# Patient Record
Sex: FEMALE | Race: BLACK OR AFRICAN AMERICAN | Employment: OTHER | URBAN - METROPOLITAN AREA
[De-identification: names, ages, dates, MRNs, and addresses within clinical notes are randomized per-mention and may not be internally consistent; named-entity substitution may affect disease eponyms.]

---

## 2017-06-02 ENCOUNTER — OFFICE VISIT (OUTPATIENT)
Dept: FAMILY MEDICINE CLINIC | Age: 57
End: 2017-06-02

## 2017-06-02 VITALS
DIASTOLIC BLOOD PRESSURE: 90 MMHG | TEMPERATURE: 98.2 F | HEIGHT: 62 IN | SYSTOLIC BLOOD PRESSURE: 152 MMHG | HEART RATE: 67 BPM | BODY MASS INDEX: 29.44 KG/M2 | RESPIRATION RATE: 18 BRPM | WEIGHT: 160 LBS | OXYGEN SATURATION: 96 %

## 2017-06-02 DIAGNOSIS — F41.9 ANXIETY: ICD-10-CM

## 2017-06-02 DIAGNOSIS — D50.9 IRON DEFICIENCY ANEMIA, UNSPECIFIED IRON DEFICIENCY ANEMIA TYPE: ICD-10-CM

## 2017-06-02 DIAGNOSIS — M16.11 ARTHRITIS OF RIGHT HIP: Primary | ICD-10-CM

## 2017-06-02 RX ORDER — DICLOFENAC SODIUM 75 MG/1
75 TABLET, DELAYED RELEASE ORAL
Qty: 60 TAB | Refills: 1 | Status: SHIPPED | OUTPATIENT
Start: 2017-06-02 | End: 2018-09-28

## 2017-06-02 RX ORDER — ALPRAZOLAM 0.5 MG/1
TABLET ORAL
Qty: 60 TAB | Refills: 2 | Status: SHIPPED | OUTPATIENT
Start: 2017-06-02 | End: 2017-11-20 | Stop reason: SDUPTHER

## 2017-06-02 NOTE — PROGRESS NOTES
Brit Marcos is a 61 y.o. female   Chief Complaint   Patient presents with    Medication Refill    pt here for f/u of her anxiety and states that the xanax is working well and does need a refill of this. No issues with med. Pt also reports that she feels achey in the morning and does not exercise at all. Takes BC powder for her achiness and sometimes helps. Mostly in her R hip. Pt also overdue for her anemia check pt is not taking any iron, no increased fatigue. she is a 61y.o. year old female who presents for evalution. Reviewed PmHx, RxHx, FmHx, SocHx, AllgHx and updated and dated in the chart. Review of Systems - negative except as listed above in the HPI    Objective:     Vitals:    06/02/17 0723   BP: 152/90   Pulse: 67   Resp: 18   Temp: 98.2 °F (36.8 °C)   TempSrc: Oral   SpO2: 96%   Weight: 160 lb (72.6 kg)   Height: 5' 2\" (1.575 m)       Current Outpatient Prescriptions   Medication Sig    ALPRAZolam (XANAX) 0.5 mg tablet take 1 tablet by mouth twice a day if needed. Must last 30 days.  diclofenac EC (VOLTAREN) 75 mg EC tablet Take 1 Tab by mouth two (2) times daily as needed.  lisinopril-hydroCHLOROthiazide (PRINZIDE, ZESTORETIC) 20-12.5 mg per tablet Take 1 Tab by mouth daily.  omeprazole (PRILOSEC) 40 mg capsule Take 1 Cap by mouth daily.  ondansetron (ZOFRAN ODT) 4 mg disintegrating tablet Take 1 Tab by mouth every eight (8) hours as needed for Nausea.  nystatin (MYCOSTATIN) topical cream Apply  to affected area two (2) times a day.  baclofen (LIORESAL) 10 mg tablet Take 1 tablet by mouth three (3) times daily as needed.  buPROPion XL (WELLBUTRIN XL) 150 mg tablet Take 1 Tab by mouth every morning.  HYDROcodone-acetaminophen (LORTAB) 7.5-500 mg per tablet Take  by mouth every four (4) hours as needed for Pain.  ibuprofen (MOTRIN) 800 mg tablet Take  by mouth. No current facility-administered medications for this visit.         Physical Examination: General appearance - alert, well appearing, and in no distress  Eyes - pupils equal and reactive, extraocular eye movements intact  Chest - clear to auscultation, no wheezes, rales or rhonchi, symmetric air entry  Heart - normal rate, regular rhythm, normal S1, S2, no murmurs, rubs, clicks or gallops  Musculoskeletal - mild ttp ovr lateral R hip joint, tight muscles of calf b/l likely from inactivity      Assessment/ Plan:   Irving Garcia was seen today for medication refill. Diagnoses and all orders for this visit:    Arthritis of right hip  -     diclofenac EC (VOLTAREN) 75 mg EC tablet; Take 1 Tab by mouth two (2) times daily as needed. Discussed slowly increasing activity as well  Anxiety  -     ALPRAZolam (XANAX) 0.5 mg tablet; take 1 tablet by mouth twice a day if needed. Must last 30 days. Iron deficiency anemia, unspecified iron deficiency anemia type  -     CBC WITH AUTOMATED DIFF       Follow-up Disposition:  Return in about 3 months (around 9/2/2017), or if symptoms worsen or fail to improve. I have discussed the diagnosis with the patient and the intended plan as seen in the above orders. The patient has received an after-visit summary and questions were answered concerning future plans. Pt conveyed understanding of plan.     Medication Side Effects and Warnings were discussed with patient      Thanh Grover DO

## 2017-06-03 LAB
BASOPHILS # BLD AUTO: 0 X10E3/UL (ref 0–0.2)
BASOPHILS NFR BLD AUTO: 1 %
EOSINOPHIL # BLD AUTO: 0.1 X10E3/UL (ref 0–0.4)
EOSINOPHIL NFR BLD AUTO: 2 %
ERYTHROCYTE [DISTWIDTH] IN BLOOD BY AUTOMATED COUNT: 16.6 % (ref 12.3–15.4)
HCT VFR BLD AUTO: 35.4 % (ref 34–46.6)
HGB BLD-MCNC: 11 G/DL (ref 11.1–15.9)
IMM GRANULOCYTES # BLD: 0 X10E3/UL (ref 0–0.1)
IMM GRANULOCYTES NFR BLD: 0 %
LYMPHOCYTES # BLD AUTO: 1.3 X10E3/UL (ref 0.7–3.1)
LYMPHOCYTES NFR BLD AUTO: 29 %
MCH RBC QN AUTO: 30 PG (ref 26.6–33)
MCHC RBC AUTO-ENTMCNC: 31.1 G/DL (ref 31.5–35.7)
MCV RBC AUTO: 97 FL (ref 79–97)
MONOCYTES # BLD AUTO: 0.4 X10E3/UL (ref 0.1–0.9)
MONOCYTES NFR BLD AUTO: 9 %
NEUTROPHILS # BLD AUTO: 2.7 X10E3/UL (ref 1.4–7)
NEUTROPHILS NFR BLD AUTO: 59 %
PLATELET # BLD AUTO: 315 X10E3/UL (ref 150–379)
RBC # BLD AUTO: 3.67 X10E6/UL (ref 3.77–5.28)
WBC # BLD AUTO: 4.5 X10E3/UL (ref 3.4–10.8)

## 2017-06-05 NOTE — PROGRESS NOTES
Your labs are showing a very mild anemia. I would like you to start a daily multivitamin with iron and then we can recheck this in 6-8 weeks.

## 2017-11-17 DIAGNOSIS — K21.9 GERD WITHOUT ESOPHAGITIS: ICD-10-CM

## 2017-11-17 RX ORDER — OMEPRAZOLE 40 MG/1
CAPSULE, DELAYED RELEASE ORAL
Qty: 30 CAP | Refills: 11 | Status: SHIPPED | OUTPATIENT
Start: 2017-11-17 | End: 2018-05-11 | Stop reason: ALTCHOICE

## 2017-11-20 ENCOUNTER — OFFICE VISIT (OUTPATIENT)
Dept: FAMILY MEDICINE CLINIC | Age: 57
End: 2017-11-20

## 2017-11-20 VITALS
HEART RATE: 63 BPM | HEIGHT: 62 IN | OXYGEN SATURATION: 99 % | TEMPERATURE: 98 F | DIASTOLIC BLOOD PRESSURE: 82 MMHG | SYSTOLIC BLOOD PRESSURE: 158 MMHG | RESPIRATION RATE: 16 BRPM | BODY MASS INDEX: 29.08 KG/M2 | WEIGHT: 158 LBS

## 2017-11-20 DIAGNOSIS — J30.9 ACUTE ALLERGIC RHINITIS, UNSPECIFIED SEASONALITY, UNSPECIFIED TRIGGER: ICD-10-CM

## 2017-11-20 DIAGNOSIS — H10.13 ALLERGIC CONJUNCTIVITIS OF BOTH EYES: Primary | ICD-10-CM

## 2017-11-20 DIAGNOSIS — F41.9 ANXIETY: ICD-10-CM

## 2017-11-20 RX ORDER — ALPRAZOLAM 0.5 MG/1
TABLET ORAL
Qty: 60 TAB | Refills: 2 | Status: SHIPPED | OUTPATIENT
Start: 2017-11-20 | End: 2018-05-11 | Stop reason: SDUPTHER

## 2017-11-20 RX ORDER — CROMOLYN SODIUM 40 MG/ML
1 SOLUTION/ DROPS OPHTHALMIC 4 TIMES DAILY
Qty: 10 ML | Refills: 0 | Status: SHIPPED | OUTPATIENT
Start: 2017-11-20

## 2017-11-20 RX ORDER — AZELASTINE 1 MG/ML
2 SPRAY, METERED NASAL 2 TIMES DAILY
Qty: 1 BOTTLE | Refills: 1 | Status: SHIPPED | OUTPATIENT
Start: 2017-11-20 | End: 2018-09-21

## 2017-11-20 NOTE — MR AVS SNAPSHOT
Visit Information Date & Time Provider Department Dept. Phone Encounter #  
 11/20/2017  9:15 AM Marcelle Chavez NP [address] [facility] [phone] [id number] Follow-up Instructions Return if symptoms worsen or fail to improve. Upcoming Health Maintenance Date Due  
 BREAST CANCER SCRN MAMMOGRAM 11/11/2018 PAP AKA CERVICAL CYTOLOGY 11/11/2019 COLONOSCOPY 4/24/2024 DTaP/Tdap/Td series (2 - Td) 11/11/2026 Allergies as of 11/20/2017  Review Complete On: 11/20/2017 By: Marcelle Chavez NP No Known Allergies Current Immunizations  Never Reviewed No immunizations on file. Not reviewed this visit You Were Diagnosed With   
  
 Codes Comments Allergic conjunctivitis of both eyes    -  Primary ICD-10-CM: H10.13 ICD-9-CM: 372.14 Acute allergic rhinitis, unspecified seasonality, unspecified trigger     ICD-10-CM: J30.9 ICD-9-CM: 477.9 Anxiety     ICD-10-CM: F41.9 ICD-9-CM: 300.00 Vitals BP Pulse Temp Resp Height(growth percentile) Weight(growth percentile) 158/82 63 98 °F (36.7 °C) (Oral) 16 5' 2\" (1.575 m) 158 lb (71.7 kg) SpO2 BMI OB Status Smoking Status 99% 28.9 kg/m2 Hysterectomy Current Every Day Smoker Vitals History BMI and BSA Data Body Mass Index Body Surface Area  
 28.9 kg/m 2 1.77 m 2 Preferred Pharmacy Pharmacy Name Phone [id number] Fatuma [phone] Your Updated Medication List  
  
   
This list is accurate as of: 11/20/17  9:39 AM.  Always use your most recent med list.  
  
  
  
  
 ALPRAZolam 0.5 mg tablet Commonly known as:  XANAX  
take 1 tablet by mouth twice a day if needed. Must last 30 days. azelastine 137 mcg (0.1 %) nasal spray Commonly known as:  ASTELIN  
2 Sprays by Both Nostrils route two (2) times a day. Use in each nostril as directed  
  
 baclofen 10 mg tablet Commonly known as:  LIORESAL Take 1 tablet by mouth three (3) times daily as needed. buPROPion  mg tablet Commonly known as:  James Cord Take 1 Tab by mouth every morning. cromolyn 4 % ophthalmic solution Commonly known as:  OPTICROM Administer 1 Drop to both eyes four (4) times daily. Use in affected eye(s)  
  
 diclofenac EC 75 mg EC tablet Commonly known as:  VOLTAREN Take 1 Tab by mouth two (2) times daily as needed. ibuprofen 800 mg tablet Commonly known as:  MOTRIN Take  by mouth.  
  
 lisinopril-hydroCHLOROthiazide 20-12.5 mg per tablet Commonly known as:  Holdrege Boxer Take 1 Tab by mouth daily. nystatin topical cream  
Commonly known as:  MYCOSTATIN Apply  to affected area two (2) times a day. omeprazole 40 mg capsule Commonly known as:  PRILOSEC  
take 1 capsule by mouth once daily  
  
 ondansetron 4 mg disintegrating tablet Commonly known as:  ZOFRAN ODT Take 1 Tab by mouth every eight (8) hours as needed for Nausea. Prescriptions Printed Refills ALPRAZolam (XANAX) 0.5 mg tablet 2 Sig: take 1 tablet by mouth twice a day if needed. Must last 30 days. Class: Print Prescriptions Sent to Pharmacy Refills  
 cromolyn (OPTICROM) 4 % ophthalmic solution 0 Sig: Administer 1 Drop to both eyes four (4) times daily. Use in affected eye(s) Class: Normal  
 Pharmacy: [id number] [address] [place] [address], [facility] Ph #: [phone] Route: Both Eyes  
 azelastine (ASTELIN) 137 mcg (0.1 %) nasal spray 1 Si Sprays by Both Nostrils route two (2) times a day. Use in each nostril as directed Class: Normal  
 [id number], [facility] [address] [facility] Ph #: [phone] Route: Both Nostrils Follow-up Instructions Return if symptoms worsen or fail to improve. Introducing [facility]!    
 Dear Michael Rose: 
 Thank you for requesting a [employer] account. Our records indicate that you already have an active Mig[employer] account. You can access your account anytime at [email or web address] Did you know that you can access your hospital and ER discharge instructions at any time in Migoa? You can also review all of your test results from your hospital stay or ER visit. Additional Information If you have questions, please visit the Frequently Asked Questions section of the Migoa website at [email or web address] Remember, [employer]oa is NOT to be used for urgent needs. For medical emergencies, dial 911. Now available from your iPhone and Android! Please provide this summary of care documentation to your next provider. Your primary care clinician is listed as Dann Terry. If you have any questions after today's visit, please call [phone].

## 2017-11-20 NOTE — PROGRESS NOTES
1. Have you been to the ER, urgent care clinic since your last visit? Hospitalized since your last visit? No    2. Have you seen or consulted any other health care providers outside of the [address] [place] [address] since your last visit? Include any pap smears or colon screening.  No     Chief Complaint   Patient presents with    Medication Refill     Omerprazole, Xanax    Sinus Infection     Puffy eyes, Stuffy ears, x4 days

## 2017-11-20 NOTE — PROGRESS NOTES
Chief Complaint   Patient presents with    Medication Refill     Omerprazole, Xanax    Sinus Infection     Puffy eyes, Stuffy ears, x4 days     she is a 61y.o. year old female who presents for evalution. Pt states here for Xanax refills. Takes as needed for anxiety. Pt states has been having irritation and itching in eyes and ears. Pt has been taking Benadryl but doesn't help at al.  Also has post nasal drainage. Reviewed PmHx, RxHx, FmHx, SocHx, AllgHx and updated and dated in the chart. Review of Systems - negative except as listed above in the HPI    Objective:     Vitals:    11/20/17 0925   BP: 158/82   Pulse: 63   Resp: 16   Temp: 98 °F (36.7 °C)   TempSrc: Oral   SpO2: 99%   Weight: 158 lb (71.7 kg)   Height: 5' 2\" (1.575 m)     Physical Examination: General appearance - alert, well appearing, and in no distress  Mental status - alert, oriented to person, place, and time, normal mood, behavior, speech, dress, motor activity, and thought processes  Eyes - slight bilateral conjunctivitis   Ears - bilateral TM's and external ear canals normal  Nose - normal nontender sinuses, mucosal congestion and mucosal pallor  Mouth - mucous membranes moist, pharynx normal without lesions  Neck - supple, no significant adenopathy  Chest - clear to auscultation, no wheezes, rales or rhonchi, symmetric air entry  Heart - normal rate, regular rhythm, normal S1, S2, no murmurs, rubs, clicks or gallops    Assessment/ Plan:   Diagnoses and all orders for this visit:    1. Allergic conjunctivitis of both eyes  -     cromolyn (OPTICROM) 4 % ophthalmic solution; Administer 1 Drop to both eyes four (4) times daily. Use in affected eye(s)  New rx. F/U prn    2. Acute allergic rhinitis, unspecified seasonality, unspecified trigger  -     azelastine (ASTELIN) 137 mcg (0.1 %) nasal spray; 2 Sprays by Both Nostrils route two (2) times a day. Use in each nostril as directed  New rx. F/U prn    3.  Anxiety  - ALPRAZolam (XANAX) 0.5 mg tablet; take 1 tablet by mouth twice a day if needed. Must last 30 days. Reviewed , pt taking appropriately. Refills provided. Pt voiced understanding regarding plan of care. Follow-up Disposition:  Return if symptoms worsen or fail to improve. I have discussed the diagnosis with the patient and the intended plan as seen in the above orders. The patient has received an after-visit summary and questions were answered concerning future plans.      Medication Side Effects and Warnings were discussed with patient    Raymundo Black NP

## 2017-12-22 DIAGNOSIS — I10 ESSENTIAL HYPERTENSION: ICD-10-CM

## 2017-12-22 RX ORDER — LISINOPRIL AND HYDROCHLOROTHIAZIDE 12.5; 2 MG/1; MG/1
TABLET ORAL
Qty: 30 TAB | Refills: 5 | Status: SHIPPED | OUTPATIENT
Start: 2017-12-22 | End: 2020-09-03

## 2018-05-11 ENCOUNTER — OFFICE VISIT (OUTPATIENT)
Dept: FAMILY MEDICINE CLINIC | Age: 58
End: 2018-05-11

## 2018-05-11 VITALS
HEART RATE: 69 BPM | DIASTOLIC BLOOD PRESSURE: 98 MMHG | TEMPERATURE: 97.5 F | SYSTOLIC BLOOD PRESSURE: 161 MMHG | HEIGHT: 62 IN | WEIGHT: 159 LBS | OXYGEN SATURATION: 100 % | RESPIRATION RATE: 16 BRPM | BODY MASS INDEX: 29.26 KG/M2

## 2018-05-11 DIAGNOSIS — F41.9 ANXIETY: ICD-10-CM

## 2018-05-11 DIAGNOSIS — I10 ESSENTIAL HYPERTENSION: ICD-10-CM

## 2018-05-11 DIAGNOSIS — K21.9 GERD WITHOUT ESOPHAGITIS: ICD-10-CM

## 2018-05-11 DIAGNOSIS — Z00.00 ROUTINE GENERAL MEDICAL EXAMINATION AT A HEALTH CARE FACILITY: Primary | ICD-10-CM

## 2018-05-11 DIAGNOSIS — Z12.39 SCREENING FOR BREAST CANCER: ICD-10-CM

## 2018-05-11 RX ORDER — ALPRAZOLAM 0.5 MG/1
TABLET ORAL
Qty: 60 TAB | Refills: 2 | Status: SHIPPED | OUTPATIENT
Start: 2018-05-11 | End: 2018-11-08 | Stop reason: SDUPTHER

## 2018-05-11 RX ORDER — BUPROPION HYDROCHLORIDE 150 MG/1
150 TABLET ORAL
Qty: 30 TAB | Refills: 6 | Status: SHIPPED | OUTPATIENT
Start: 2018-05-11 | End: 2020-10-07 | Stop reason: SDUPTHER

## 2018-05-11 RX ORDER — PANTOPRAZOLE SODIUM 40 MG/1
40 TABLET, DELAYED RELEASE ORAL DAILY
Qty: 30 TAB | Refills: 5 | Status: SHIPPED | OUTPATIENT
Start: 2018-05-11 | End: 2018-10-14 | Stop reason: SDUPTHER

## 2018-05-11 NOTE — PROGRESS NOTES
1. Have you been to the ER, urgent care clinic since your last visit? Hospitalized since your last visit? No    2. Have you seen or consulted any other health care providers outside of the [address] [place] [address] since your last visit? Include any pap smears or colon screening.  No   Chief Complaint   Patient presents with    Medication Refill     Xanax

## 2018-05-11 NOTE — PATIENT INSTRUCTIONS
Well Visit, Women 48 to 72: Care Instructions  Your Care Instructions    Physical exams can help you stay healthy. Your doctor has checked your overall health and may have suggested ways to take good care of yourself. He or she also may have recommended tests. At home, you can help prevent illness with healthy eating, regular exercise, and other steps. Follow-up care is a key part of your treatment and safety. Be sure to make and go to all appointments, and call your doctor if you are having problems. It's also a good idea to know your test results and keep a list of the medicines you take. How can you care for yourself at home? · Reach and stay at a healthy weight. This will lower your risk for many problems, such as obesity, diabetes, heart disease, and high blood pressure. · Get at least 30 minutes of exercise on most days of the week. Walking is a good choice. You also may want to do other activities, such as running, swimming, cycling, or playing tennis or team sports. · Do not smoke. Smoking can make health problems worse. If you need help quitting, talk to your doctor about stop-smoking programs and medicines. These can increase your chances of quitting for good. · Protect your skin from too much sun. When you're outdoors from 10 a.m. to 4 p.m., stay in the shade or cover up with clothing and a hat with a wide brim. Wear sunglasses that block UV rays. Even when it's cloudy, put broad-spectrum sunscreen (SPF 30 or higher) on any exposed skin. · See a dentist one or two times a year for checkups and to have your teeth cleaned. · Wear a seat belt in the car. · Limit alcohol to 1 drink a day. Too much alcohol can cause health problems. Follow your doctor's advice about when to have certain tests. These tests can spot problems early. · Cholesterol.  Your doctor will tell you how often to have this done based on your age, family history, or other things that can increase your risk for heart attack and stroke. · Blood pressure. Have your blood pressure checked during a routine doctor visit. Your doctor will tell you how often to check your blood pressure based on your age, your blood pressure results, and other factors. · Mammogram. Ask your doctor how often you should have a mammogram, which is an X-ray of your breasts. A mammogram can spot breast cancer before it can be felt and when it is easiest to treat. · Pap test and pelvic exam. Ask your doctor how often you should have a Pap test. You may not need to have a Pap test as often as you used to. · Vision. Have your eyes checked every year or two or as often as your doctor suggests. Some experts recommend that you have yearly exams for glaucoma and other age-related eye problems starting at age 48. · Hearing. Tell your doctor if you notice any change in your hearing. You can have tests to find out how well you hear. · Diabetes. Ask your doctor whether you should have tests for diabetes. · Colon cancer. You should begin tests for colon cancer at age 48. You may have one of several tests. Your doctor will tell you how often to have tests based on your age and risk. Risks include whether you already had a precancerous polyp removed from your colon or whether your parents, sisters and brothers, or children have had colon cancer. · Thyroid disease. Talk to your doctor about whether to have your thyroid checked as part of a regular physical exam. Women have an increased chance of a thyroid problem. · Osteoporosis. You should begin tests for bone density at age 72. If you are younger than 72, ask your doctor whether you have factors that may increase your risk for this disease. You may want to have this test before age 72. · Heart attack and stroke risk. At least every 4 to 6 years, you should have your risk for heart attack and stroke assessed.  Your doctor uses factors such as your age, blood pressure, cholesterol, and whether you smoke or have diabetes to show what your risk for a heart attack or stroke is over the next 10 years. When should you call for help? Watch closely for changes in your health, and be sure to contact your doctor if you have any problems or symptoms that concern you. Where can you learn more? Go to http://maurice-cristopher.info/. Enter A692 in the search box to learn more about \"Well Visit, Women 50 to 72: Care Instructions. \"  Current as of: May 12, 2017  Content Version: 11.4  © 1870-4625 [employer]. Care instructions adapted under license by [employer] (which disclaims liability or warranty for this information). If you have questions about a medical condition or this instruction, always ask your healthcare professional. [address] any warranty or liability for your use of this information.

## 2018-05-11 NOTE — MR AVS SNAPSHOT
[address] [address] 
[phone] Patient: Todd Aguilera MRN: [id number] ZQT:[id number] Visit Information Date & Time Provider Department Dept. Phone Encounter #  
 5/11/2018  7:45 AM Bhavna Sidhu NP [facility] [phone] [id number] Follow-up Instructions Return if symptoms worsen or fail to improve. Upcoming Health Maintenance Date Due Influenza Age 5 to Adult 8/1/2018 BREAST CANCER SCRN MAMMOGRAM 11/11/2018 PAP AKA CERVICAL CYTOLOGY 11/11/2019 COLONOSCOPY 4/24/2024 DTaP/Tdap/Td series (2 - Td) 11/11/2026 Allergies as of 5/11/2018  Review Complete On: 5/11/2018 By: Mary Henderson LPN No Known Allergies Current Immunizations  Never Reviewed No immunizations on file. Not reviewed this visit You Were Diagnosed With   
  
 Codes Comments Routine general medical examination at a health care facility    -  Primary ICD-10-CM: Z00.00 ICD-9-CM: V70.0 Essential hypertension     ICD-10-CM: I10 
ICD-9-CM: 401.9 Anxiety     ICD-10-CM: F41.9 ICD-9-CM: 300.00 GERD without esophagitis     ICD-10-CM: K21.9 ICD-9-CM: 530.81 Screening for breast cancer     ICD-10-CM: Z12.31 
ICD-9-CM: V76.10 Vitals BP Pulse Temp Resp Height(growth percentile) Weight(growth percentile) (!) 161/98 69 97.5 °F (36.4 °C) (Oral) 16 5' 2\" (1.575 m) 159 lb (72.1 kg) SpO2 BMI OB Status Smoking Status 100% 29.08 kg/m2 Hysterectomy Current Every Day Smoker BMI and BSA Data Body Mass Index Body Surface Area 29.08 kg/m 2 1.78 m 2 Preferred Pharmacy Pharmacy Name Phone [id number] [phone] Your Updated Medication List  
  
   
This list is accurate as of 5/11/18  8:18 AM.  Always use your most recent med list.  
  
  
  
  
 ALPRAZolam 0.5 mg tablet Commonly known as:  XANAX  
take 1 tablet by mouth twice a day if needed. Must last 30 days. azelastine 137 mcg (0.1 %) nasal spray Commonly known as:  ASTELIN  
2 Sprays by Both Nostrils route two (2) times a day. Use in each nostril as directed  
  
 baclofen 10 mg tablet Commonly known as:  LIORESAL Take 1 tablet by mouth three (3) times daily as needed. buPROPion  mg tablet Commonly known as:  Abner Neeru Take 1 Tab by mouth every morning. cromolyn 4 % ophthalmic solution Commonly known as:  OPTICROM Administer 1 Drop to both eyes four (4) times daily. Use in affected eye(s)  
  
 diclofenac EC 75 mg EC tablet Commonly known as:  VOLTAREN Take 1 Tab by mouth two (2) times daily as needed. ibuprofen 800 mg tablet Commonly known as:  MOTRIN Take  by mouth.  
  
 lisinopril-hydroCHLOROthiazide 20-12.5 mg per tablet Commonly known as:  PRINZIDE, ZESTORETIC  
take 1 tablet by mouth once daily  
  
 nystatin topical cream  
Commonly known as:  MYCOSTATIN Apply  to affected area two (2) times a day. ondansetron 4 mg disintegrating tablet Commonly known as:  ZOFRAN ODT Take 1 Tab by mouth every eight (8) hours as needed for Nausea. pantoprazole 40 mg tablet Commonly known as:  PROTONIX Take 1 Tab by mouth daily. Prescriptions Printed Refills ALPRAZolam (XANAX) 0.5 mg tablet 2 Sig: take 1 tablet by mouth twice a day if needed. Must last 30 days. Class: Print Prescriptions Sent to Pharmacy Refills  
 pantoprazole (PROTONIX) 40 mg tablet 5 Sig: Take 1 Tab by mouth daily. Class: Normal  
 [id number], [address] [facility] Ph #: [phone] Route: Oral  
 buPROPion XL (WELLBUTRIN XL) 150 mg tablet 6 Sig: Take 1 Tab by mouth every morning.   
 Class: Normal  
 [id number], [address] [place] [address] [address] Ph #: [phone] Route: Oral  
  
We Performed the Following CBC WITH AUTOMATED DIFF [23225 CPT(R)] LIPID PANEL [99006 CPT(R)] METABOLIC PANEL, COMPREHENSIVE [96789 CPT(R)] TSH 3RD GENERATION [23966 CPT(R)] Follow-up Instructions Return if symptoms worsen or fail to improve. To-Do List   
 05/11/2018 Imaging:  [facility] MAM[facility] BI SCREENING INCL CAD Patient Instructions Well Visit, Women 48 to 72: Care Instructions Your Care Instructions Physical exams can help you stay healthy. Your doctor has checked your overall health and may have suggested ways to take good care of yourself. He or she also may have recommended tests. At home, you can help prevent illness with healthy eating, regular exercise, and other steps. Follow-up care is a key part of your treatment and safety. Be sure to make and go to all appointments, and call your doctor if you are having problems. It's also a good idea to know your test results and keep a list of the medicines you take. How can you care for yourself at home? · Reach and stay at a healthy weight. This will lower your risk for many problems, such as obesity, diabetes, heart disease, and high blood pressure. · Get at least 30 minutes of exercise on most days of the week. Walking is a good choice. You also may want to do other activities, such as running, swimming, cycling, or playing tennis or team sports. · Do not smoke. Smoking can make health problems worse. If you need help quitting, talk to your doctor about stop-smoking programs and medicines. These can increase your chances of quitting for good. · Protect your skin from too much sun. When you're outdoors from 10 a.m. to 4 p.m., stay in the shade or cover up with clothing and a hat with a wide brim. Wear sunglasses that block UV rays. Even when it's cloudy, put broad-spectrum sunscreen (SPF 30 or higher) on any exposed skin. · See a dentist one or two times a year for checkups and to have your teeth cleaned. · Wear a seat belt in the car. · Limit alcohol to 1 drink a day. Too much alcohol can cause health problems. Follow your doctor's advice about when to have certain tests. These tests can spot problems early. · Cholesterol. Your doctor will tell you how often to have this done based on your age, family history, or other things that can increase your risk for heart attack and stroke. · Blood pressure. Have your blood pressure checked during a routine doctor visit. Your doctor will tell you how often to check your blood pressure based on your age, your blood pressure results, and other factors. · Mammogram. Ask your doctor how often you should have a mammogram, which is an X-ray of your breasts. A mammogram can spot breast cancer before it can be felt and when it is easiest to treat. · Pap test and pelvic exam. Ask your doctor how often you should have a Pap test. You may not need to have a Pap test as often as you used to. · Vision. Have your eyes checked every year or two or as often as your doctor suggests. Some experts recommend that you have yearly exams for glaucoma and other age-related eye problems starting at age 48. · Hearing. Tell your doctor if you notice any change in your hearing. You can have tests to find out how well you hear. · Diabetes. Ask your doctor whether you should have tests for diabetes. · Colon cancer. You should begin tests for colon cancer at age 48. You may have one of several tests. Your doctor will tell you how often to have tests based on your age and risk. Risks include whether you already had a precancerous polyp removed from your colon or whether your parents, sisters and brothers, or children have had colon cancer. · Thyroid disease. Talk to your doctor about whether to have your thyroid checked as part of a regular physical exam. Women have an increased chance of a thyroid problem. · Osteoporosis. You should begin tests for bone density at age 72. If you are younger than 72, ask your doctor whether you have factors that may increase your risk for this disease. You may want to have this test before age 72. · Heart attack and stroke risk. At least every 4 to 6 years, you should have your risk for heart attack and stroke assessed. Your doctor uses factors such as your age, blood pressure, cholesterol, and whether you smoke or have diabetes to show what your risk for a heart attack or stroke is over the next 10 years. When should you call for help? Watch closely for changes in your health, and be sure to contact your doctor if you have any problems or symptoms that concern you. Where can you learn more? Go to http://maurice-cristopher.info/. Enter X055 in the search box to learn more about \"Well Visit, Women 50 to 72: Care Instructions. \" Current as of: May 12, 2017 Content Version: 11.4 © 3822-5556 [employer]. Care instructions adapted under license by [facility] (which disclaims liability or warranty for this information). If you have questions about a medical condition or this instruction, always ask your healthcare professional. [address] any warranty or liability for your use of this information. Introducing [facility]! Dear Sanket Savage: Thank you for requesting a [employer] account. Our records indicate that you already have an active [employer] account. You can access your account anytime at [email or web address]. [email or web address] Did you know that you can access your hospital and ER discharge instructions at any time in [employer]? You can also review all of your test results from your hospital stay or ER visit. Additional Information If you have questions, please visit the Frequently Asked Questions section of the [employer] website at [email or web address] [email or web address]/. Remember, [employer] is NOT to be used for urgent needs. For medical emergencies, dial 911. Now available from your iPhone and Android! Please provide this summary of care documentation to your next provider. Your primary care clinician is listed as Dann Terry. If you have any questions after today's visit, please call [phone].

## 2018-05-11 NOTE — PROGRESS NOTES
Tawnya Fischer is a 61 y.o. female presenting for their annual checkup. Follows a low fat diet?  no.  Dietary recall: Variable, depends on day. Drinks mostly water and juice. Follow an exercise program?  No but is active at work   Hours of sleep? 6 hrs, wakes up in middle of night 2-3 am - always been that way   Changes in bowel or bladder habits?  no  Up to date on Tdap (<10 years)? Yes   Feels stable and well emotionally? Yes     Current concerns include: Pt states has not been taking blood pressure medication. Just forgets to do take it, has been long term problem for patient. Has been having headaches, no chest pain, SOB, dizziness, or vision changes. Has not had eye exam in past year. Here today for Xanax refills, takes daily - occasionally will have to take 2. Constantly stressed and tired, working a lot and doesn't have any energy when gets home. States GERD has been worsening. Past Medical History:   Diagnosis Date    Hypertension       Past Surgical History:   Procedure Laterality Date    HC LAP BAND ADJUST PROCEDURE      HX CERVICAL FUSION      HX HYSTERECTOMY      REMOVAL GALLBLADDER        Prior to Admission medications    Medication Sig Start Date End Date Taking? Authorizing Provider   pantoprazole (PROTONIX) 40 mg tablet Take 1 Tab by mouth daily. 5/11/18  Yes Radha Bowles NP   ALPRAZolam Mary Craft) 0.5 mg tablet take 1 tablet by mouth twice a day if needed. Must last 30 days. 5/11/18  Yes Radha Bowles NP   buPROPion XL (WELLBUTRIN XL) 150 mg tablet Take 1 Tab by mouth every morning. 5/11/18  Yes Radha Bowles NP   cromolyn (OPTICROM) 4 % ophthalmic solution Administer 1 Drop to both eyes four (4) times daily. Use in affected eye(s) 11/20/17  Yes Radha Bowles NP   azelastine (ASTELIN) 137 mcg (0.1 %) nasal spray 2 Sprays by Both Nostrils route two (2) times a day.  Use in each nostril as directed 11/20/17  Yes Radah Bowles NP   diclofenac EC (VOLTAREN) 75 mg EC tablet Take 1 Tab by mouth two (2) times daily as needed. 6/2/17  Yes Caitlin Melgar DO   ondansetron (ZOFRAN ODT) 4 mg disintegrating tablet Take 1 Tab by mouth every eight (8) hours as needed for Nausea. 4/4/16  Yes Carlin Terry MD   nystatin (MYCOSTATIN) topical cream Apply  to affected area two (2) times a day. 10/12/15  Yes Conchita Márquez NP   baclofen (LIORESAL) 10 mg tablet Take 1 tablet by mouth three (3) times daily as needed. 12/11/14  Yes Kirsty Garcia NP   ibuprofen (MOTRIN) 800 mg tablet Take  by mouth.    Yes Historical Provider   lisinopril-hydroCHLOROthiazide (PRINZIDE, ZESTORETIC) 20-12.5 mg per tablet take 1 tablet by mouth once daily 12/22/17   Maximino Reed NP     No Known Allergies   Social History   Substance Use Topics    Smoking status: Current Every Day Smoker     Packs/day: 1.00     Years: 25.00     Types: Cigarettes    Smokeless tobacco: Current User    Alcohol use 0.0 oz/week     0 Standard drinks or equivalent per week      Comment: rare/1-2 per month      Family History   Problem Relation Age of Onset    Diabetes Mother     Heart Disease Mother     Diabetes Father     Heart Disease Father     Other Sister      IBS/bowel intestine problems        Review of Systems -   Psychological ROS: negative  Ophthalmic ROS: negative  ENT ROS: negative  Endocrine ROS: negative  Breast ROS: negative  Respiratory ROS: no cough, shortness of breath, or wheezing  Cardiovascular ROS: no chest pain or dyspnea on exertion  Gastrointestinal ROS: no abdominal pain, change in bowel habits, or black or bloody stools  Genito-Urinary ROS: no dysuria, trouble voiding, or hematuria  Musculoskeletal ROS: negative  Neurological ROS: no TIA or stroke symptoms  Dermatological ROS: negative    Objective:  Visit Vitals    BP (!) 161/98    Pulse 69    Temp 97.5 °F (36.4 °C) (Oral)    Resp 16    Ht 5' 2\" (1.575 m)    Wt 159 lb (72.1 kg)    SpO2 100%    BMI 29.08 kg/m2     The physical exam is generally normal. Patient appears well, alert and oriented x 3, pleasant, cooperative. Vitals are as noted. Neck supple and free of adenopathy, or masses. No thyromegaly. YUVAL. Ears, throat are normal. Lungs are clear to auscultation. Heart sounds are normal, no murmurs, clicks, gallops or rubs. Abdomen is soft, no tenderness, masses or organomegaly. Breasts: patient declines to have breast exam. Self exam is encouraged. Pelvis: exam declined by the patient. Extremities are normal. Peripheral pulses are normal. Screening neurological exam is normal without focal findings. Skin is normal without suspicious lesions noted. Assessment/Plan:  Diagnoses and all orders for this visit:    1. Routine general medical examination at a health care facility  -     CBC WITH AUTOMATED DIFF  -     TSH 3RD GENERATION  -     METABOLIC PANEL, COMPREHENSIVE  -     LIPID PANEL    2. Essential hypertension  -     METABOLIC PANEL, COMPREHENSIVE  Stressed importance of compliance. Encouraged pt to monitor BP at home, preferably in AM when first wakes up. Goal BP is < 130/90 if on meds. Discussed consequences of uncontrolled HTN and need for yearly eye exam. Recommended pt limit salt intake and engage in regular exercise. Continue current medications. F/U 3 mo or sooner if needed    3. Anxiety  -     TSH 3RD GENERATION  -     ALPRAZolam (XANAX) 0.5 mg tablet; take 1 tablet by mouth twice a day if needed. Must last 30 days. -     buPROPion XL (WELLBUTRIN XL) 150 mg tablet; Take 1 Tab by mouth every morning. New rx of Wellbutrin. Refills provided on Xanax, reviewed , pt taking appropriately. F/U prn    4. GERD without esophagitis  -     pantoprazole (PROTONIX) 40 mg tablet; Take 1 Tab by mouth daily. New rx. F/U prn    5. Screening for breast cancer  -     [facility] MAMMO BI SCREENING INCL CAD; Future    Discussed importance of healthy diet and regular exercise, recommended multivitamin and sunscreen usage.   Encouraged monthly self breast/testicular exam.      Follow-up Disposition:  Return if symptoms worsen or fail to improve.     Cecily Dejesus, NP

## 2018-05-12 LAB
ALBUMIN SERPL-MCNC: 3.9 G/DL (ref 3.5–5.5)
ALBUMIN/GLOB SERPL: 1.7 {RATIO} (ref 1.2–2.2)
ALP SERPL-CCNC: 88 IU/L (ref 39–117)
ALT SERPL-CCNC: 5 IU/L (ref 0–32)
AST SERPL-CCNC: 11 IU/L (ref 0–40)
BASOPHILS # BLD AUTO: 0 X10E3/UL (ref 0–0.2)
BASOPHILS NFR BLD AUTO: 0 %
BILIRUB SERPL-MCNC: <0.2 MG/DL (ref 0–1.2)
BUN SERPL-MCNC: 14 MG/DL (ref 6–24)
BUN/CREAT SERPL: 22 (ref 9–23)
CALCIUM SERPL-MCNC: 9.1 MG/DL (ref 8.7–10.2)
CHLORIDE SERPL-SCNC: 106 MMOL/L (ref 96–106)
CHOLEST SERPL-MCNC: 217 MG/DL (ref 100–199)
CO2 SERPL-SCNC: 21 MMOL/L (ref 18–29)
CREAT SERPL-MCNC: 0.63 MG/DL (ref 0.57–1)
EOSINOPHIL # BLD AUTO: 0.1 X10E3/UL (ref 0–0.4)
EOSINOPHIL NFR BLD AUTO: 1 %
ERYTHROCYTE [DISTWIDTH] IN BLOOD BY AUTOMATED COUNT: 16.7 % (ref 12.3–15.4)
GFR SERPLBLD CREATININE-BSD FMLA CKD-EPI: 114 ML/MIN/1.73
GFR SERPLBLD CREATININE-BSD FMLA CKD-EPI: 99 ML/MIN/1.73
GLOBULIN SER CALC-MCNC: 2.3 G/DL (ref 1.5–4.5)
GLUCOSE SERPL-MCNC: 69 MG/DL (ref 65–99)
HCT VFR BLD AUTO: 31.9 % (ref 34–46.6)
HDLC SERPL-MCNC: 96 MG/DL
HGB BLD-MCNC: 10.4 G/DL (ref 11.1–15.9)
IMM GRANULOCYTES # BLD: 0 X10E3/UL (ref 0–0.1)
IMM GRANULOCYTES NFR BLD: 0 %
INTERPRETATION, 910389: NORMAL
LDLC SERPL CALC-MCNC: 113 MG/DL (ref 0–99)
LYMPHOCYTES # BLD AUTO: 1 X10E3/UL (ref 0.7–3.1)
LYMPHOCYTES NFR BLD AUTO: 21 %
MCH RBC QN AUTO: 30.8 PG (ref 26.6–33)
MCHC RBC AUTO-ENTMCNC: 32.6 G/DL (ref 31.5–35.7)
MCV RBC AUTO: 94 FL (ref 79–97)
MONOCYTES # BLD AUTO: 0.3 X10E3/UL (ref 0.1–0.9)
MONOCYTES NFR BLD AUTO: 6 %
NEUTROPHILS # BLD AUTO: 3.4 X10E3/UL (ref 1.4–7)
NEUTROPHILS NFR BLD AUTO: 72 %
PLATELET # BLD AUTO: 351 X10E3/UL (ref 150–379)
POTASSIUM SERPL-SCNC: 4.2 MMOL/L (ref 3.5–5.2)
PROT SERPL-MCNC: 6.2 G/DL (ref 6–8.5)
RBC # BLD AUTO: 3.38 X10E6/UL (ref 3.77–5.28)
SODIUM SERPL-SCNC: 142 MMOL/L (ref 134–144)
TRIGL SERPL-MCNC: 39 MG/DL (ref 0–149)
TSH SERPL DL<=0.005 MIU/L-ACNC: 0.47 UIU/ML (ref 0.45–4.5)
VLDLC SERPL CALC-MCNC: 8 MG/DL (ref 5–40)
WBC # BLD AUTO: 4.8 X10E3/UL (ref 3.4–10.8)

## 2018-05-14 NOTE — PROGRESS NOTES
Anemia present - worsening, unclear etiology -suspect could be secondary to lap band, will see if pt taking any vitamins. Recheck 1 mo.    Cholesterol slightly above goal - work on diet and exercise

## 2018-05-24 ENCOUNTER — HOSPITAL ENCOUNTER (OUTPATIENT)
Dept: MAMMOGRAPHY | Age: 58
Discharge: HOME | End: 2018-05-24
Attending: NURSE PRACTITIONER
Payer: COMMERCIAL

## 2018-05-24 DIAGNOSIS — Z12.31 VISIT FOR SCREENING MAMMOGRAM: ICD-10-CM

## 2018-05-24 PROCEDURE — 77063 BREAST TOMOSYNTHESIS BI: CPT

## 2018-06-15 ENCOUNTER — HOSPITAL ENCOUNTER (OUTPATIENT)
Dept: MAMMOGRAPHY | Age: 58
Discharge: HOME | End: 2018-06-15
Attending: FAMILY MEDICINE
Payer: COMMERCIAL

## 2018-06-15 DIAGNOSIS — R92.8 MAMMOGRAM ABNORMAL: ICD-10-CM

## 2018-06-15 PROCEDURE — 77065 DX MAMMO INCL CAD UNI: CPT

## 2018-06-20 ENCOUNTER — HOSPITAL ENCOUNTER (OUTPATIENT)
Dept: MAMMOGRAPHY | Age: 58
Discharge: HOME | End: 2018-06-20
Attending: FAMILY MEDICINE
Payer: COMMERCIAL

## 2018-06-20 DIAGNOSIS — R92.1 BREAST CALCIFICATIONS: ICD-10-CM

## 2018-06-20 PROCEDURE — 74011000250 HC RX REV CODE- 250: Performed by: RADIOLOGY

## 2018-06-20 PROCEDURE — 88305 TISSUE EXAM BY PATHOLOGIST: CPT | Performed by: RADIOLOGY

## 2018-06-20 PROCEDURE — A4648 IMPLANTABLE TISSUE MARKER: HCPCS

## 2018-06-20 PROCEDURE — 77065 DX MAMMO INCL CAD UNI: CPT

## 2018-06-20 RX ORDER — LIDOCAINE HYDROCHLORIDE 10 MG/ML
15 INJECTION, SOLUTION EPIDURAL; INFILTRATION; INTRACAUDAL; PERINEURAL ONCE
Status: COMPLETED | OUTPATIENT
Start: 2018-06-20 | End: 2018-06-20

## 2018-06-20 RX ORDER — LIDOCAINE HYDROCHLORIDE AND EPINEPHRINE 10; 10 MG/ML; UG/ML
10 INJECTION, SOLUTION INFILTRATION; PERINEURAL ONCE
Status: COMPLETED | OUTPATIENT
Start: 2018-06-20 | End: 2018-06-20

## 2018-06-20 RX ADMIN — LIDOCAINE HYDROCHLORIDE AND EPINEPHRINE 100 MG: 10; 10 INJECTION, SOLUTION INFILTRATION; PERINEURAL at 10:30

## 2018-06-20 RX ADMIN — LIDOCAINE HYDROCHLORIDE 15 ML: 10 INJECTION, SOLUTION EPIDURAL; INFILTRATION; INTRACAUDAL; PERINEURAL at 10:30

## 2018-06-20 NOTE — DISCHARGE INSTRUCTIONS
Breast Biopsy Discharge Instructions    PAIN CONTROL     You may have mild discomfort; take 1-2 Tylenol every 4-6 hours as needed.  Do not take aspirin containing products or anti-inflammatory medications (Advil, Aleve, Motrin, Ibuprofen, etc.) for 24 hours.  Wearing a comfortable bra for support may help with discomfort. WOUND CARE      A small amount of bleeding or bruising at the biopsy site is normal. Watch for signs and symptoms of infections: skin warm to touch, yellowish drainage from wound, fever or severe pain.  Place an ice pack over the site hourly, 20-30 at a time for a few hours today.  The clear dressing is water resistant (you may shower, but do not allow the dressing to become saturated). You may remove the dressing in 48 hours. The steri-strips (small pieces of tape covering the incision) will fall off on their own in a few days. If the clear dressing irritates your skin or begins to peel off, you may remove it. Remember, if you remove the clear dressing before 48 hours, you should not get the site wet.  If at any point you have EXCESSIVE BLEEDING (saturating the gauze under the clear dressing) OR pain please call:    Daytime 7:30am-5:00pm: [facility] [phone]    After Hours:    [phone] (ask to speak to a radiologist)              or [facility] [phone]    ACTIVITY     Do not participate in any strenuous activities for 24 hours (exercise, sports, housework, etc.).  You may resume work (light duty only) for the first 24 hours.  No heavy lifting with the arm on the affected side of your body. ADDITIONAL INSTRUCTIONS    We will call you with results on Friday June 22 in the afternoon.        YOUR TREATMENT TEAM TODAY WAS    MD: Daniele Jeffrey  RN: [facility]  Radiology Tech: Jatinder Thompson [id number]

## 2018-06-20 NOTE — PROGRESS NOTES
Patient tolerated right breast biopsy well with minimal bleeding. Biopsy site was bandaged in the usual fashion and discharge instructions were reviewed with the patient. She was provided a written copy as well. Advised patient pathology results should be available by Friday afternoon June 22 and that she will receive a phone call from our office. Encouraged patient to call with any questions or concerns.

## 2018-06-21 NOTE — PROGRESS NOTES
Pathology reviewed and approved by Dr. Mauricio Ford. Called patient and conveyed benign results. Advised patient that Dr. Mauricio Ford recommends 6 month follow up for other area of concern. Patient states that the biopsy site is healing well and is just a little bit sore. Encouraged patient to call with any questions or concerns.

## 2018-08-09 ENCOUNTER — OFFICE VISIT (OUTPATIENT)
Dept: FAMILY MEDICINE CLINIC | Age: 58
End: 2018-08-09

## 2018-08-09 VITALS
HEART RATE: 59 BPM | HEIGHT: 62 IN | SYSTOLIC BLOOD PRESSURE: 133 MMHG | DIASTOLIC BLOOD PRESSURE: 77 MMHG | RESPIRATION RATE: 18 BRPM | BODY MASS INDEX: 28.59 KG/M2 | OXYGEN SATURATION: 98 % | WEIGHT: 155.38 LBS | TEMPERATURE: 98.1 F

## 2018-08-09 DIAGNOSIS — M54.16 LUMBAR RADICULOPATHY: Primary | ICD-10-CM

## 2018-08-09 RX ORDER — KETOROLAC TROMETHAMINE 30 MG/ML
60 INJECTION, SOLUTION INTRAMUSCULAR; INTRAVENOUS ONCE
Qty: 1 VIAL | Refills: 0
Start: 2018-08-09 | End: 2018-08-09

## 2018-08-09 RX ORDER — PREDNISONE 10 MG/1
TABLET ORAL
Qty: 21 TAB | Refills: 0 | Status: SHIPPED | OUTPATIENT
Start: 2018-08-09 | End: 2018-09-21

## 2018-08-09 NOTE — PATIENT INSTRUCTIONS

## 2018-08-09 NOTE — PROGRESS NOTES
1. Have you been to the ER, urgent care clinic since your last visit? Hospitalized since your last visit? No    2. Have you seen or consulted any other health care providers outside of the [facility] since your last visit? Include any pap smears or colon screening.  No   Chief Complaint   Patient presents with    Back Pain     pain in back going around her right leg going down down leg, stiffiness alot lately, hx of cervical neck fushion, started on Saturday, Sunday mon,Tues very painful in bed       '

## 2018-08-09 NOTE — PROGRESS NOTES
Sheri Monroe is a 61 y.o. female   Chief Complaint   Patient presents with    Back Pain     pain in back going around her right leg going down down leg, stiffiness alot lately, hx of cervical neck fushion, started on Saturday, Sunday mon,Tues very painful in bed    pt states she is having pain in her R buttocks are and wraps around into the inside of her thigh/ groin region. The pain then goes down the medial aspect of her R leg. States this is a shooting pain and reports it as a 9/10. This pain has been going on since Friday or Saturday. Pt has taken motrin with no relief. Pain is c/w l2-3 region    she is a 61y.o. year old female who presents for evalution. Reviewed PmHx, RxHx, FmHx, SocHx, AllgHx and updated and dated in the chart. Review of Systems - negative except as listed above in the HPI    Objective:     Vitals:    08/09/18 1601   BP: 133/77   Pulse: (!) 59   Resp: 18   Temp: 98.1 °F (36.7 °C)   TempSrc: Oral   SpO2: 98%   Weight: 155 lb 6 oz (70.5 kg)   Height: 5' 2\" (1.575 m)       Current Outpatient Prescriptions   Medication Sig    predniSONE (STERAPRED DS) 10 mg dose pack See administration instruction per 10mg dose pack    ketorolac tromethamine (TORADOL) 60 mg/2 mL soln 2 mL by IntraMUSCular route once for 1 dose.  pantoprazole (PROTONIX) 40 mg tablet Take 1 Tab by mouth daily.  ALPRAZolam (XANAX) 0.5 mg tablet take 1 tablet by mouth twice a day if needed. Must last 30 days.  buPROPion XL (WELLBUTRIN XL) 150 mg tablet Take 1 Tab by mouth every morning.  lisinopril-hydroCHLOROthiazide (PRINZIDE, ZESTORETIC) 20-12.5 mg per tablet take 1 tablet by mouth once daily    cromolyn (OPTICROM) 4 % ophthalmic solution Administer 1 Drop to both eyes four (4) times daily. Use in affected eye(s)    azelastine (ASTELIN) 137 mcg (0.1 %) nasal spray 2 Sprays by Both Nostrils route two (2) times a day.  Use in each nostril as directed    diclofenac EC (VOLTAREN) 75 mg EC tablet Take 1 Tab by mouth two (2) times daily as needed.  ondansetron (ZOFRAN ODT) 4 mg disintegrating tablet Take 1 Tab by mouth every eight (8) hours as needed for Nausea.  nystatin (MYCOSTATIN) topical cream Apply  to affected area two (2) times a day.  baclofen (LIORESAL) 10 mg tablet Take 1 tablet by mouth three (3) times daily as needed.  ibuprofen (MOTRIN) 800 mg tablet Take  by mouth. No current facility-administered medications for this visit. Physical Examination: General appearance - alert, well appearing, and in no distress  Chest - clear to auscultation, no wheezes, rales or rhonchi, symmetric air entry  Heart - normal rate, regular rhythm, normal S1, S2, no murmurs, rubs, clicks or gallops  Back exam - limited range of motion, pain with motion noted during exam, pain reproduced with flexion of hip joint  Musculoskeletal - no ttp over greater trochanter neg dilma test    Assessment/ Plan:   Diagnoses and all orders for this visit:    1. Lumbar radiculopathy  -     XR SPINE LUMB 2 OR 3 V; Future  -     predniSONE (STERAPRED DS) 10 mg dose pack; See administration instruction per 10mg dose pack  -     ketorolac tromethamine (TORADOL) 60 mg/2 mL soln; 2 mL by IntraMUSCular route once for 1 dose. -     KETOROLAC TROMETHAMINE INJ  -     CT THER/PROPH/DIAG INJECTION, SUBCUT/IM       Follow-up Disposition:  Return if symptoms worsen or fail to improve. I have discussed the diagnosis with the patient and the intended plan as seen in the above orders. The patient has received an after-visit summary and questions were answered concerning future plans. Pt conveyed understanding of plan.     Medication Side Effects and Warnings were discussed with patient      Brynn Rehman DO

## 2018-08-10 ENCOUNTER — TELEPHONE (OUTPATIENT)
Dept: FAMILY MEDICINE CLINIC | Age: 58
End: 2018-08-10

## 2018-08-10 ENCOUNTER — HOSPITAL ENCOUNTER (OUTPATIENT)
Dept: GENERAL RADIOLOGY | Age: 58
Discharge: HOME | End: 2018-08-10
Attending: FAMILY MEDICINE
Payer: COMMERCIAL

## 2018-08-10 DIAGNOSIS — M54.16 LUMBAR RADICULOPATHY: ICD-10-CM

## 2018-08-10 PROCEDURE — 72100 X-RAY EXAM L-S SPINE 2/3 VWS: CPT

## 2018-08-10 NOTE — PROGRESS NOTES
Your x-ray is showing arthritis in your back from L3-L5.   I would like for you to see a spine doctor if your symptoms are not improving, [facility] [phone] Dr Daniel Hunter or Dr Mable Cushing

## 2018-08-13 ENCOUNTER — TELEPHONE (OUTPATIENT)
Dept: FAMILY MEDICINE CLINIC | Age: 58
End: 2018-08-13

## 2018-08-13 DIAGNOSIS — M54.2 NECK PAIN ON RIGHT SIDE: ICD-10-CM

## 2018-08-13 RX ORDER — BACLOFEN 10 MG/1
10 TABLET ORAL
Qty: 30 TAB | Refills: 2 | Status: SHIPPED | OUTPATIENT
Start: 2018-08-13 | End: 2021-01-19 | Stop reason: SDUPTHER

## 2018-08-13 RX ORDER — GABAPENTIN 300 MG/1
300 CAPSULE ORAL 2 TIMES DAILY
Qty: 60 CAP | Refills: 1 | Status: SHIPPED | OUTPATIENT
Start: 2018-08-13 | End: 2019-05-10

## 2018-08-13 NOTE — TELEPHONE ENCOUNTER
Patient called stated that she is not able to get an appointment with the specialists you referred her to until middle to end of September. She states that she was given a steroid injection but no pain medication and has no relief.       She is requesting another recommendation to specialist.    She may be reached at

## 2018-08-13 NOTE — TELEPHONE ENCOUNTER
----- Message from Carolyne Person sent at 8/13/2018 11:29 AM EDT -----  Regarding: Dr. Shavon Swartz: [phone]  Pt left a message earlier for a call back with a new specialist recommendation. Pt received a steroid shot on Thursday 08/09/18 and an Rx for \"Prednisone\" Pt is requesting a stronger prescription be sent to her pharmacy on file, [facility]. Pt said she is in severe pain. Pt. best contact number [phone].

## 2018-08-13 NOTE — TELEPHONE ENCOUNTER
Will send in rx for pt to help with nerve pain and also a prescription for a muscle relaxer. It is likely they will both make her drowsy as an [place]. Dr. Kirby Manzano will be back in office on Wednesday to address her other concerns.    Thanks,  N

## 2018-08-15 NOTE — TELEPHONE ENCOUNTER
Patient id x 3, notified of recommendation to MARIA ELENA AT [facility] ortho and verbalized understanding. Pt states that yesterday she was sent home from work. States that she has been taking the meds that she was rx'd and that while she was working she started vomiting. Also reports, that she was having trouble walking and pain has started to shot down her (L) leg as well. Pt requested to have her XR report faxed to Dr. Bronson Angle office at [phone]. Writer also advised pt to call the imaging center and see if they can send the actual images to him as well.

## 2018-09-21 ENCOUNTER — HOSPITAL ENCOUNTER (OUTPATIENT)
Dept: PREADMISSION TESTING | Age: 58
Discharge: HOME | End: 2018-09-21
Attending: SPECIALIST
Payer: COMMERCIAL

## 2018-09-21 VITALS
HEIGHT: 62 IN | HEART RATE: 64 BPM | SYSTOLIC BLOOD PRESSURE: 170 MMHG | DIASTOLIC BLOOD PRESSURE: 80 MMHG | BODY MASS INDEX: 27.67 KG/M2 | OXYGEN SATURATION: 100 % | RESPIRATION RATE: 20 BRPM | WEIGHT: 150.35 LBS | TEMPERATURE: 98 F

## 2018-09-21 LAB
ABO + RH BLD: NORMAL
ALBUMIN SERPL-MCNC: 4.1 G/DL (ref 3.5–5)
ALBUMIN/GLOB SERPL: 1.2 {RATIO} (ref 1.1–2.2)
ALP SERPL-CCNC: 99 U/L (ref 45–117)
ALT SERPL-CCNC: 12 U/L (ref 12–78)
ANION GAP SERPL CALC-SCNC: 5 MMOL/L (ref 5–15)
APPEARANCE UR: CLEAR
APTT PPP: 32.4 SEC (ref 22.1–32)
AST SERPL-CCNC: 12 U/L (ref 15–37)
ATRIAL RATE: 58 BPM
BACTERIA URNS QL MICRO: NEGATIVE /HPF
BASOPHILS # BLD: 0 K/UL (ref 0–0.1)
BASOPHILS NFR BLD: 1 % (ref 0–1)
BILIRUB SERPL-MCNC: 0.3 MG/DL (ref 0.2–1)
BILIRUB UR QL: NEGATIVE
BLOOD GROUP ANTIBODIES SERPL: NORMAL
BUN SERPL-MCNC: 8 MG/DL (ref 6–20)
BUN/CREAT SERPL: 12 (ref 12–20)
CALCIUM SERPL-MCNC: 8.9 MG/DL (ref 8.5–10.1)
CALCULATED P AXIS, ECG09: 25 DEGREES
CALCULATED R AXIS, ECG10: 10 DEGREES
CALCULATED T AXIS, ECG11: 28 DEGREES
CHLORIDE SERPL-SCNC: 110 MMOL/L (ref 97–108)
CO2 SERPL-SCNC: 26 MMOL/L (ref 21–32)
COLOR UR: NORMAL
CREAT SERPL-MCNC: 0.66 MG/DL (ref 0.55–1.02)
DIAGNOSIS, 93000: NORMAL
DIFFERENTIAL METHOD BLD: ABNORMAL
EOSINOPHIL # BLD: 0 K/UL (ref 0–0.4)
EOSINOPHIL NFR BLD: 1 % (ref 0–7)
EPITH CASTS URNS QL MICRO: NORMAL /LPF
ERYTHROCYTE [DISTWIDTH] IN BLOOD BY AUTOMATED COUNT: 16.7 % (ref 11.5–14.5)
EST. AVERAGE GLUCOSE BLD GHB EST-MCNC: NORMAL MG/DL
GLOBULIN SER CALC-MCNC: 3.4 G/DL (ref 2–4)
GLUCOSE SERPL-MCNC: 90 MG/DL (ref 65–100)
GLUCOSE UR STRIP.AUTO-MCNC: NEGATIVE MG/DL
HBA1C MFR BLD: 4.6 % (ref 4.2–6.3)
HCT VFR BLD AUTO: 35.8 % (ref 35–47)
HGB BLD-MCNC: 11.3 G/DL (ref 11.5–16)
HGB UR QL STRIP: NEGATIVE
HYALINE CASTS URNS QL MICRO: NORMAL /LPF (ref 0–5)
IMM GRANULOCYTES # BLD: 0 K/UL (ref 0–0.04)
IMM GRANULOCYTES NFR BLD AUTO: 0 % (ref 0–0.5)
INR PPP: 1.1 (ref 0.9–1.1)
KETONES UR QL STRIP.AUTO: NEGATIVE MG/DL
LEUKOCYTE ESTERASE UR QL STRIP.AUTO: NEGATIVE
LYMPHOCYTES # BLD: 1.1 K/UL (ref 0.8–3.5)
LYMPHOCYTES NFR BLD: 26 % (ref 12–49)
MCH RBC QN AUTO: 30.5 PG (ref 26–34)
MCHC RBC AUTO-ENTMCNC: 31.6 G/DL (ref 30–36.5)
MCV RBC AUTO: 96.5 FL (ref 80–99)
MONOCYTES # BLD: 0.3 K/UL (ref 0–1)
MONOCYTES NFR BLD: 6 % (ref 5–13)
NEUTS SEG # BLD: 2.9 K/UL (ref 1.8–8)
NEUTS SEG NFR BLD: 66 % (ref 32–75)
NITRITE UR QL STRIP.AUTO: NEGATIVE
NRBC # BLD: 0 K/UL (ref 0–0.01)
NRBC BLD-RTO: 0 PER 100 WBC
P-R INTERVAL, ECG05: 146 MS
PH UR STRIP: 6 [PH] (ref 5–8)
PLATELET # BLD AUTO: 310 K/UL (ref 150–400)
PMV BLD AUTO: 11 FL (ref 8.9–12.9)
POTASSIUM SERPL-SCNC: 3.7 MMOL/L (ref 3.5–5.1)
PROT SERPL-MCNC: 7.5 G/DL (ref 6.4–8.2)
PROT UR STRIP-MCNC: NEGATIVE MG/DL
PROTHROMBIN TIME: 10.8 SEC (ref 9–11.1)
Q-T INTERVAL, ECG07: 426 MS
QRS DURATION, ECG06: 70 MS
QTC CALCULATION (BEZET), ECG08: 418 MS
RBC # BLD AUTO: 3.71 M/UL (ref 3.8–5.2)
RBC #/AREA URNS HPF: NORMAL /HPF (ref 0–5)
SODIUM SERPL-SCNC: 141 MMOL/L (ref 136–145)
SP GR UR REFRACTOMETRY: 1.01 (ref 1–1.03)
SPECIMEN EXP DATE BLD: NORMAL
THERAPEUTIC RANGE,PTTT: ABNORMAL SECS (ref 58–77)
UA: UC IF INDICATED,UAUC: NORMAL
UROBILINOGEN UR QL STRIP.AUTO: 1 EU/DL (ref 0.2–1)
VENTRICULAR RATE, ECG03: 58 BPM
WBC # BLD AUTO: 4.4 K/UL (ref 3.6–11)
WBC URNS QL MICRO: NORMAL /HPF (ref 0–4)

## 2018-09-21 PROCEDURE — 36415 COLL VENOUS BLD VENIPUNCTURE: CPT | Performed by: SPECIALIST

## 2018-09-21 PROCEDURE — 85025 COMPLETE CBC W/AUTO DIFF WBC: CPT | Performed by: SPECIALIST

## 2018-09-21 PROCEDURE — 81001 URINALYSIS AUTO W/SCOPE: CPT | Performed by: SPECIALIST

## 2018-09-21 PROCEDURE — 83036 HEMOGLOBIN GLYCOSYLATED A1C: CPT | Performed by: SPECIALIST

## 2018-09-21 PROCEDURE — 85610 PROTHROMBIN TIME: CPT | Performed by: SPECIALIST

## 2018-09-21 PROCEDURE — 85730 THROMBOPLASTIN TIME PARTIAL: CPT | Performed by: SPECIALIST

## 2018-09-21 PROCEDURE — 80053 COMPREHEN METABOLIC PANEL: CPT | Performed by: SPECIALIST

## 2018-09-21 PROCEDURE — 86900 BLOOD TYPING SEROLOGIC ABO: CPT | Performed by: SPECIALIST

## 2018-09-21 PROCEDURE — 93005 ELECTROCARDIOGRAM TRACING: CPT

## 2018-09-21 RX ORDER — AZELASTINE 1 MG/ML
1 SPRAY, METERED NASAL AS NEEDED
COMMUNITY

## 2018-09-21 NOTE — PERIOP NOTES
[facility] Preoperative Instructions Surgery Date 09/26/18         Time of [address] [place]  Contact # [phone] cell 1. On the day of your surgery, please report to the Surgical Services Registration Desk and sign in at your designated time. The Surgery Center is located to the right of the Emergency Room. 2. You must have someone with you to drive you home. You should not drive a car for 24 hours following surgery. Please make arrangements for a friend or family member to stay with you for the first 24 hours after your surgery. 3. Do not have anything to eat or drink (including water, gum, mints, coffee, juice) after midnight ?09/25/18   . ? This may not apply to medications prescribed by your physician. ?(Please note below the special instructions with medications to take the morning of your procedure.) 4. We recommend you do not drink any alcoholic beverages for 24 hours before and after your surgery. 5. Contact your surgeons office for instructions on the following medications: non-steroidal anti-inflammatory drugs (i.e. Advil, Aleve), vitamins, and supplements. (Some surgeons will want you to stop these medications prior to surgery and others may allow you to take them) **If you are currently taking Plavix, Coumadin, Aspirin and/or other blood-thinning agents, contact your surgeon for instructions. ** Your surgeon will partner with the physician prescribing these medications to determine if it is safe to stop or if you need to continue taking. Please do not stop taking these medications without instructions from your surgeon 6. Wear comfortable clothes. Wear glasses instead of contacts. Do not bring any money or jewelry. Please bring picture ID, insurance card, and any prearranged co-payment or hospital payment. Do not wear make-up, particularly mascara the morning of your surgery.   Do not wear nail polish, particularly if you are having foot /hand surgery. Wear your hair loose or down, no ponytails, buns, clepoatra pins or clips. All body piercings must be removed. Please shower with antibacterial soap for three consecutive days before and on the morning of surgery, but do not apply any lotions, powders or deodorants after the shower on the day of surgery. Please use a fresh towels after each shower. Please sleep in clean clothes and change bed linens the night before surgery. Please do not shave for 48 hours prior to surgery. Shaving of the face is acceptable. 7. You should understand that if you do not follow these instructions your surgery may be cancelled. If your physical condition changes (I.e. fever, cold or flu) please contact your surgeon as soon as possible. 8. It is important that you be on time. If a situation occurs where you may be late, please call [phone] (OR Holding Area). 9. If you have any questions and or problems, please call [phone] (Pre-admission Testing). 10. Your surgery time may be subject to change. You will receive a phone call the evening prior if your time changes. 11.  If having outpatient surgery, you must have someone to drive you here, stay with you during the duration of your stay, and to drive you home at time of discharge. 12.   In an effort to improve the efficiency, privacy, and safety for all of our Pre-op patients visitors are not allowed in the Holding area. Once you arrive and are registered your family/visitors will be asked to remain in the waiting room. The Pre-op staff will get you from the Surgical Waiting Area and will explain to you and your family/visitors that the Pre-op phase is beginning. The staff will answer any questions and provide instructions for tracking of the patient, by use of the existing tracking number and color-coded status board in the waiting room.   At this time the staff will also ask for your designated spokesperson information in the event that the physician or staff need to provide an update or obtain any pertinent information. The designated spokesperson will be notified if the physician needs to speak to family during the pre-operative phase. If at any time your family/visitors has questions or concerns they may approach the volunteer desk in the waiting area for assistance. Special Instructions:Practice with incentive spirometry---please bring incentive spirometry back to the hospital for use MEDICATIONS TO TAKE THE MORNING OF SURGERY WITH A SIP OF WATER:xanax as needed,nasal as needed,baclofen,wellbutrin,eye drops as needed,gabapentin,protonix I understand a pre-operative phone call will be made to verify my surgery time. In the event that I am not available, I give permission for a message to be left on my answering service and/or with another person? yes  
 
 
 
 ___________________      __________   _________ 
  (Signature of Patient)             (Witness)                (Date and Time)

## 2018-09-21 NOTE — PERIOP NOTES
Incentive Maurilio Dillard Using the incentive spirometer helps expand the small air sacs of your lungs, helps you breathe deeply, and helps improve your lung function. Use your incentive spirometer twice a day (10 breaths each time) prior to surgery. How to Use Your Incentive Spirometer: 1. Hold the incentive spirometer in an upright position. 2. Breathe out as usual.  
3. Place the mouthpiece in your mouth and seal your lips tightly around it. 4. Take a deep breath. Breathe in slowly and as deeply as possible. Keep the blue flow rate guide between the arrows. 5. Hold your breath as long as possible. Then exhale slowly and allow the piston to fall to the bottom of the column. 6. Rest for a few seconds and repeat steps one through five at least 10 times. PAT Tidal Volume_____1500_____________  x____2____________  Date_____09/21/18__________________ BRING THE INCENTIVE SPIROMETER WITH YOU TO THE HOSPITAL ON THE DAY OF YOUR SURGERY. Opportunity given to ask and answer questions as well as to observe return demonstration. Patient signature_____________________________    Witness____________________________

## 2018-09-21 NOTE — PERIOP NOTES
Preventing Infections Before  and After  Your Surgery IMPORTANT INSTRUCTIONS Please read and follow these instructions carefully. Every Night for Three (3) nights before your surgery: 1. Shower with an antibacterial soap, such as Dial, or the soap provided at your preassessment appointment. A shower is better than a bath for cleaning your skin. 2. If needed, ask someone to help you reach all areas of your body. Dont forget to clean your belly button with every shower. The night before your surgery: 
1. On the night before your surgery, shower with an antibacterial soap, such as Dial, or the soap provided at your preassessment appointment. 2. With one packet of Hibiclens in hand, turn water off. 
3. Apply Hibiclens antiseptic skin cleanser with a clean, freshly washed washcloth. ? Gently apply to your body from chin to toes (except the genital area) and especially the area(s) where your incision(s) will be. ? Leave Hibiclens on your skin for at least 20 seconds. CAUTION: If needed, Hibiclens may be used to clean the folds of skin of the legs (such as in the area of the groin) and on your buttocks and hips. However, do not use Hibiclens above the neck or in the genital area (your bottom) or put inside any area of your body. 4. Turn the water back on and rinse. 5. Dry gently with a clean, freshly washed towel. 6. After your shower, do not use any powder, deodorant, perfumes or lotion. 7. Use clean, freshly washed towels and washcloths every time you shower. 8. Wear clean, freshly washed pajamas to bed the night before surgery. 9. Sleep on clean, freshly washed sheets. 10. Do not allow pets to sleep in your bed with you. The Morning of your surgery: 1. Shower again thoroughly with an antibacterial soap, such as Dial or the soap provided at your preassessment appointment. If needed, ask someone for help to reach all areas of your body. Dont forget to clean your belly button! Rinse. 2. Dry gently with a clean, freshly washed towel. 3. After your shower, do not use any powder, deodorant, perfumes or lotion prior to surgery. 4. Put on clean, freshly washed clothing. Tips to help prevent infections after your surgery: 1. Protect your surgical wound from germs: 
? Hand washing is the most important thing you and your caregivers can do to prevent infections. ? Keep your bandage clean and dry! ? Do not touch your surgical wound. 2. Use clean, freshly washed towels and washcloths every time you shower; do not share bath linens with others. 3. Until your surgical wound is healed, wear clothing and sleep on bed linens each day that are clean and freshly washed. 4. Do not allow pets to sleep in your bed with you or touch your surgical wound. 5. Do not smoke  smoking delays wound healing. This may be a good time to stop smoking. 6. If you have diabetes, it is important for you to manage your blood sugar levels properly before your surgery as well as after your surgery. Poorly managed blood sugar levels slow down wound healing and prevent you from healing completely.

## 2018-09-22 LAB
BACTERIA SPEC CULT: NORMAL
BACTERIA SPEC CULT: NORMAL
SERVICE CMNT-IMP: NORMAL

## 2018-09-26 ENCOUNTER — ANESTHESIA EVENT (OUTPATIENT)
Dept: SURGERY | Age: 58
DRG: 455 | End: 2018-09-26
Payer: COMMERCIAL

## 2018-09-26 ENCOUNTER — HOSPITAL ENCOUNTER (INPATIENT)
Age: 58
LOS: 2 days | Discharge: HOME HEALTH | DRG: 455 | End: 2018-09-28
Attending: SPECIALIST | Admitting: SPECIALIST
Payer: COMMERCIAL

## 2018-09-26 ENCOUNTER — APPOINTMENT (OUTPATIENT)
Dept: GENERAL RADIOLOGY | Age: 58
DRG: 455 | End: 2018-09-26
Attending: SPECIALIST
Payer: COMMERCIAL

## 2018-09-26 ENCOUNTER — ANESTHESIA (OUTPATIENT)
Dept: SURGERY | Age: 58
DRG: 455 | End: 2018-09-26
Payer: COMMERCIAL

## 2018-09-26 DIAGNOSIS — Z98.1 S/P LUMBAR SPINAL FUSION: Primary | ICD-10-CM

## 2018-09-26 PROBLEM — M43.16 SPONDYLOLISTHESIS OF LUMBAR REGION: Status: ACTIVE | Noted: 2018-09-26

## 2018-09-26 PROCEDURE — 77030039327 HC SPCR SPN ELEVATE MEDT -K1: Performed by: SPECIALIST

## 2018-09-26 PROCEDURE — 74011250636 HC RX REV CODE- 250/636

## 2018-09-26 PROCEDURE — 77030008684 HC TU ET CUF COVD -B: Performed by: NURSE ANESTHETIST, CERTIFIED REGISTERED

## 2018-09-26 PROCEDURE — 77030020061 HC IV BLD WRMR ADMIN SET 3M -B: Performed by: NURSE ANESTHETIST, CERTIFIED REGISTERED

## 2018-09-26 PROCEDURE — 77030003029 HC SUT VCRL J&J -B: Performed by: SPECIALIST

## 2018-09-26 PROCEDURE — 77030014647 HC SEAL FBRN TISSL BAXT -D: Performed by: SPECIALIST

## 2018-09-26 PROCEDURE — C1713 ANCHOR/SCREW BN/BN,TIS/BN: HCPCS | Performed by: SPECIALIST

## 2018-09-26 PROCEDURE — 76010000174 HC OR TIME 3.5 TO 4 HR INTENSV-TIER 1: Performed by: SPECIALIST

## 2018-09-26 PROCEDURE — 3E0U0GB INTRODUCTION OF RECOMBINANT BONE MORPHOGENETIC PROTEIN INTO JOINTS, OPEN APPROACH: ICD-10-PCS | Performed by: SPECIALIST

## 2018-09-26 PROCEDURE — 77030013079 HC BLNKT BAIR HGGR 3M -A: Performed by: NURSE ANESTHETIST, CERTIFIED REGISTERED

## 2018-09-26 PROCEDURE — 77030012406 HC DRN WND PENRS BARD -A: Performed by: SPECIALIST

## 2018-09-26 PROCEDURE — 74011250636 HC RX REV CODE- 250/636: Performed by: SPECIALIST

## 2018-09-26 PROCEDURE — 77030018836 HC SOL IRR NACL ICUM -A: Performed by: SPECIALIST

## 2018-09-26 PROCEDURE — 74011250637 HC RX REV CODE- 250/637: Performed by: NURSE PRACTITIONER

## 2018-09-26 PROCEDURE — 77010033678 HC OXYGEN DAILY

## 2018-09-26 PROCEDURE — 76210000000 HC OR PH I REC 2 TO 2.5 HR: Performed by: SPECIALIST

## 2018-09-26 PROCEDURE — 77030034850: Performed by: SPECIALIST

## 2018-09-26 PROCEDURE — 77030019908 HC STETH ESOPH SIMS -A: Performed by: NURSE ANESTHETIST, CERTIFIED REGISTERED

## 2018-09-26 PROCEDURE — 74011250636 HC RX REV CODE- 250/636: Performed by: ANESTHESIOLOGY

## 2018-09-26 PROCEDURE — 77030014007 HC SPNG HEMSTAT J&J -B: Performed by: SPECIALIST

## 2018-09-26 PROCEDURE — 77030029099 HC BN WAX SSPC -A: Performed by: SPECIALIST

## 2018-09-26 PROCEDURE — 77030018719 HC DRSG PTCH ANTIMIC J&J -A: Performed by: SPECIALIST

## 2018-09-26 PROCEDURE — 77030036564 HC WRP CLD THER BACK S2SG -B: Performed by: SPECIALIST

## 2018-09-26 PROCEDURE — 77030003193 HC GRFT RECOMB BN MEDT -H: Performed by: SPECIALIST

## 2018-09-26 PROCEDURE — 77030032490 HC SLV COMPR SCD KNE COVD -B: Performed by: SPECIALIST

## 2018-09-26 PROCEDURE — 77030020782 HC GWN BAIR PAWS FLX 3M -B

## 2018-09-26 PROCEDURE — 77030026438 HC STYL ET INTUB CARD -A: Performed by: NURSE ANESTHETIST, CERTIFIED REGISTERED

## 2018-09-26 PROCEDURE — 74011000250 HC RX REV CODE- 250

## 2018-09-26 PROCEDURE — 76060000038 HC ANESTHESIA 3.5 TO 4 HR: Performed by: SPECIALIST

## 2018-09-26 PROCEDURE — 77030013708 HC HNDPC SUC IRR PULS STRY –B: Performed by: SPECIALIST

## 2018-09-26 PROCEDURE — 77030018846 HC SOL IRR STRL H20 ICUM -A: Performed by: SPECIALIST

## 2018-09-26 PROCEDURE — 0SG00AJ FUSION OF LUMBAR VERTEBRAL JOINT WITH INTERBODY FUSION DEVICE, POSTERIOR APPROACH, ANTERIOR COLUMN, OPEN APPROACH: ICD-10-PCS | Performed by: SPECIALIST

## 2018-09-26 PROCEDURE — 77030002933 HC SUT MCRYL J&J -A: Performed by: SPECIALIST

## 2018-09-26 PROCEDURE — 0ST20ZZ RESECTION OF LUMBAR VERTEBRAL DISC, OPEN APPROACH: ICD-10-PCS | Performed by: SPECIALIST

## 2018-09-26 PROCEDURE — 72100 X-RAY EXAM L-S SPINE 2/3 VWS: CPT

## 2018-09-26 PROCEDURE — 77030020263 HC SOL INJ SOD CL0.9% LFCR 1000ML: Performed by: SPECIALIST

## 2018-09-26 PROCEDURE — 77030004391 HC BUR FLUT MEDT -C: Performed by: SPECIALIST

## 2018-09-26 PROCEDURE — 77030008467 HC STPLR SKN COVD -B: Performed by: SPECIALIST

## 2018-09-26 PROCEDURE — 65270000029 HC RM PRIVATE

## 2018-09-26 PROCEDURE — 76001 XR FLUOROSCOPY OVER 60 MINUTES: CPT

## 2018-09-26 PROCEDURE — 74011000250 HC RX REV CODE- 250: Performed by: SPECIALIST

## 2018-09-26 PROCEDURE — 74011000272 HC RX REV CODE- 272: Performed by: SPECIALIST

## 2018-09-26 PROCEDURE — 77030018723 HC ELCTRD BLD COVD -A: Performed by: SPECIALIST

## 2018-09-26 PROCEDURE — 0SG0071 FUSION OF LUMBAR VERTEBRAL JOINT WITH AUTOLOGOUS TISSUE SUBSTITUTE, POSTERIOR APPROACH, POSTERIOR COLUMN, OPEN APPROACH: ICD-10-PCS | Performed by: SPECIALIST

## 2018-09-26 PROCEDURE — 74011250637 HC RX REV CODE- 250/637: Performed by: SPECIALIST

## 2018-09-26 DEVICE — SET SCREW 5540030 5.5 TI NS BRK OFF
Type: IMPLANTABLE DEVICE | Site: SPINE LUMBAR | Status: FUNCTIONAL
Brand: CD HORIZON® SPINAL SYSTEM

## 2018-09-26 DEVICE — SPACER 7770723 ELEVATE X-LOR 23X7MM
Type: IMPLANTABLE DEVICE | Site: SPINE LUMBAR | Status: FUNCTIONAL
Brand: ELEVATE™ SPINAL SYSTEM

## 2018-09-26 RX ORDER — SODIUM CHLORIDE, SODIUM LACTATE, POTASSIUM CHLORIDE, CALCIUM CHLORIDE 600; 310; 30; 20 MG/100ML; MG/100ML; MG/100ML; MG/100ML
75 INJECTION, SOLUTION INTRAVENOUS CONTINUOUS
Status: DISCONTINUED | OUTPATIENT
Start: 2018-09-26 | End: 2018-09-26 | Stop reason: HOSPADM

## 2018-09-26 RX ORDER — SODIUM CHLORIDE 9 MG/ML
50 INJECTION, SOLUTION INTRAVENOUS CONTINUOUS
Status: DISCONTINUED | OUTPATIENT
Start: 2018-09-26 | End: 2018-09-26 | Stop reason: HOSPADM

## 2018-09-26 RX ORDER — CROMOLYN SODIUM 40 MG/ML
1 SOLUTION/ DROPS OPHTHALMIC 4 TIMES DAILY
Status: DISCONTINUED | OUTPATIENT
Start: 2018-09-26 | End: 2018-09-28 | Stop reason: HOSPADM

## 2018-09-26 RX ORDER — PROPOFOL 10 MG/ML
INJECTION, EMULSION INTRAVENOUS AS NEEDED
Status: DISCONTINUED | OUTPATIENT
Start: 2018-09-26 | End: 2018-09-26 | Stop reason: HOSPADM

## 2018-09-26 RX ORDER — SODIUM CHLORIDE 0.9 % (FLUSH) 0.9 %
5-10 SYRINGE (ML) INJECTION AS NEEDED
Status: DISCONTINUED | OUTPATIENT
Start: 2018-09-26 | End: 2018-09-26 | Stop reason: HOSPADM

## 2018-09-26 RX ORDER — HYDROMORPHONE HYDROCHLORIDE 1 MG/ML
0.2 INJECTION, SOLUTION INTRAMUSCULAR; INTRAVENOUS; SUBCUTANEOUS
Status: DISCONTINUED | OUTPATIENT
Start: 2018-09-26 | End: 2018-09-26 | Stop reason: HOSPADM

## 2018-09-26 RX ORDER — FENTANYL CITRATE 50 UG/ML
INJECTION, SOLUTION INTRAMUSCULAR; INTRAVENOUS AS NEEDED
Status: DISCONTINUED | OUTPATIENT
Start: 2018-09-26 | End: 2018-09-26 | Stop reason: HOSPADM

## 2018-09-26 RX ORDER — BUPROPION HYDROCHLORIDE 150 MG/1
150 TABLET ORAL
Status: DISCONTINUED | OUTPATIENT
Start: 2018-09-27 | End: 2018-09-28 | Stop reason: HOSPADM

## 2018-09-26 RX ORDER — OXYCODONE AND ACETAMINOPHEN 5; 325 MG/1; MG/1
1 TABLET ORAL
Status: DISCONTINUED | OUTPATIENT
Start: 2018-09-26 | End: 2018-09-27

## 2018-09-26 RX ORDER — SUCCINYLCHOLINE CHLORIDE 20 MG/ML
INJECTION INTRAMUSCULAR; INTRAVENOUS AS NEEDED
Status: DISCONTINUED | OUTPATIENT
Start: 2018-09-26 | End: 2018-09-26 | Stop reason: HOSPADM

## 2018-09-26 RX ORDER — CEFAZOLIN SODIUM/WATER 2 G/20 ML
2 SYRINGE (ML) INTRAVENOUS EVERY 8 HOURS
Status: COMPLETED | OUTPATIENT
Start: 2018-09-26 | End: 2018-09-27

## 2018-09-26 RX ORDER — SODIUM CHLORIDE 0.9 % (FLUSH) 0.9 %
5-10 SYRINGE (ML) INJECTION EVERY 8 HOURS
Status: DISCONTINUED | OUTPATIENT
Start: 2018-09-26 | End: 2018-09-26 | Stop reason: HOSPADM

## 2018-09-26 RX ORDER — EPHEDRINE SULFATE 50 MG/ML
INJECTION, SOLUTION INTRAVENOUS AS NEEDED
Status: DISCONTINUED | OUTPATIENT
Start: 2018-09-26 | End: 2018-09-26 | Stop reason: HOSPADM

## 2018-09-26 RX ORDER — ACETAMINOPHEN 325 MG/1
650 TABLET ORAL EVERY 6 HOURS
Status: DISCONTINUED | OUTPATIENT
Start: 2018-09-26 | End: 2018-09-28 | Stop reason: HOSPADM

## 2018-09-26 RX ORDER — NALOXONE HYDROCHLORIDE 0.4 MG/ML
0.4 INJECTION, SOLUTION INTRAMUSCULAR; INTRAVENOUS; SUBCUTANEOUS AS NEEDED
Status: DISCONTINUED | OUTPATIENT
Start: 2018-09-26 | End: 2018-09-28 | Stop reason: HOSPADM

## 2018-09-26 RX ORDER — GLYCOPYRROLATE 0.2 MG/ML
INJECTION INTRAMUSCULAR; INTRAVENOUS AS NEEDED
Status: DISCONTINUED | OUTPATIENT
Start: 2018-09-26 | End: 2018-09-26 | Stop reason: HOSPADM

## 2018-09-26 RX ORDER — DEXAMETHASONE SODIUM PHOSPHATE 4 MG/ML
INJECTION, SOLUTION INTRA-ARTICULAR; INTRALESIONAL; INTRAMUSCULAR; INTRAVENOUS; SOFT TISSUE AS NEEDED
Status: DISCONTINUED | OUTPATIENT
Start: 2018-09-26 | End: 2018-09-26 | Stop reason: HOSPADM

## 2018-09-26 RX ORDER — MIDAZOLAM HYDROCHLORIDE 1 MG/ML
INJECTION, SOLUTION INTRAMUSCULAR; INTRAVENOUS AS NEEDED
Status: DISCONTINUED | OUTPATIENT
Start: 2018-09-26 | End: 2018-09-26 | Stop reason: HOSPADM

## 2018-09-26 RX ORDER — LIDOCAINE HYDROCHLORIDE 10 MG/ML
0.1 INJECTION, SOLUTION EPIDURAL; INFILTRATION; INTRACAUDAL; PERINEURAL AS NEEDED
Status: DISCONTINUED | OUTPATIENT
Start: 2018-09-26 | End: 2018-09-26 | Stop reason: HOSPADM

## 2018-09-26 RX ORDER — DIPHENHYDRAMINE HCL 25 MG
25 CAPSULE ORAL
Status: DISCONTINUED | OUTPATIENT
Start: 2018-09-26 | End: 2018-09-28 | Stop reason: HOSPADM

## 2018-09-26 RX ORDER — GABAPENTIN 300 MG/1
300 CAPSULE ORAL 2 TIMES DAILY
Status: DISCONTINUED | OUTPATIENT
Start: 2018-09-26 | End: 2018-09-28 | Stop reason: HOSPADM

## 2018-09-26 RX ORDER — MIDAZOLAM HYDROCHLORIDE 1 MG/ML
1 INJECTION, SOLUTION INTRAMUSCULAR; INTRAVENOUS AS NEEDED
Status: DISCONTINUED | OUTPATIENT
Start: 2018-09-26 | End: 2018-09-26 | Stop reason: HOSPADM

## 2018-09-26 RX ORDER — BUPIVACAINE HYDROCHLORIDE AND EPINEPHRINE 5; 5 MG/ML; UG/ML
30 INJECTION, SOLUTION EPIDURAL; INTRACAUDAL; PERINEURAL ONCE
Status: COMPLETED | OUTPATIENT
Start: 2018-09-26 | End: 2018-09-26

## 2018-09-26 RX ORDER — ROCURONIUM BROMIDE 10 MG/ML
INJECTION, SOLUTION INTRAVENOUS AS NEEDED
Status: DISCONTINUED | OUTPATIENT
Start: 2018-09-26 | End: 2018-09-26 | Stop reason: HOSPADM

## 2018-09-26 RX ORDER — FENTANYL CITRATE 50 UG/ML
25 INJECTION, SOLUTION INTRAMUSCULAR; INTRAVENOUS
Status: DISCONTINUED | OUTPATIENT
Start: 2018-09-26 | End: 2018-09-26 | Stop reason: HOSPADM

## 2018-09-26 RX ORDER — ONDANSETRON 2 MG/ML
INJECTION INTRAMUSCULAR; INTRAVENOUS AS NEEDED
Status: DISCONTINUED | OUTPATIENT
Start: 2018-09-26 | End: 2018-09-26 | Stop reason: HOSPADM

## 2018-09-26 RX ORDER — LIDOCAINE HYDROCHLORIDE AND EPINEPHRINE 10; 10 MG/ML; UG/ML
1.5 INJECTION, SOLUTION INFILTRATION; PERINEURAL ONCE
Status: COMPLETED | OUTPATIENT
Start: 2018-09-26 | End: 2018-09-26

## 2018-09-26 RX ORDER — PHENYLEPHRINE HCL IN 0.9% NACL 0.4MG/10ML
SYRINGE (ML) INTRAVENOUS AS NEEDED
Status: DISCONTINUED | OUTPATIENT
Start: 2018-09-26 | End: 2018-09-26 | Stop reason: HOSPADM

## 2018-09-26 RX ORDER — ONDANSETRON 2 MG/ML
4 INJECTION INTRAMUSCULAR; INTRAVENOUS
Status: DISPENSED | OUTPATIENT
Start: 2018-09-26 | End: 2018-09-27

## 2018-09-26 RX ORDER — SODIUM CHLORIDE 0.9 % (FLUSH) 0.9 %
5-10 SYRINGE (ML) INJECTION AS NEEDED
Status: DISCONTINUED | OUTPATIENT
Start: 2018-09-26 | End: 2018-09-28 | Stop reason: HOSPADM

## 2018-09-26 RX ORDER — THROMBIN, TOPICAL (BOVINE) 20000 UNIT
20000 KIT TOPICAL ONCE
Status: COMPLETED | OUTPATIENT
Start: 2018-09-26 | End: 2018-09-26

## 2018-09-26 RX ORDER — OXYCODONE AND ACETAMINOPHEN 5; 325 MG/1; MG/1
2 TABLET ORAL
Status: DISCONTINUED | OUTPATIENT
Start: 2018-09-26 | End: 2018-09-27

## 2018-09-26 RX ORDER — SODIUM CHLORIDE 0.9 % (FLUSH) 0.9 %
5-10 SYRINGE (ML) INJECTION EVERY 8 HOURS
Status: DISCONTINUED | OUTPATIENT
Start: 2018-09-27 | End: 2018-09-28 | Stop reason: HOSPADM

## 2018-09-26 RX ORDER — HYDROMORPHONE HYDROCHLORIDE 2 MG/ML
INJECTION, SOLUTION INTRAMUSCULAR; INTRAVENOUS; SUBCUTANEOUS AS NEEDED
Status: DISCONTINUED | OUTPATIENT
Start: 2018-09-26 | End: 2018-09-26 | Stop reason: HOSPADM

## 2018-09-26 RX ORDER — MIDAZOLAM HYDROCHLORIDE 1 MG/ML
0.5 INJECTION, SOLUTION INTRAMUSCULAR; INTRAVENOUS
Status: DISCONTINUED | OUTPATIENT
Start: 2018-09-26 | End: 2018-09-26 | Stop reason: HOSPADM

## 2018-09-26 RX ORDER — SODIUM CHLORIDE, SODIUM LACTATE, POTASSIUM CHLORIDE, CALCIUM CHLORIDE 600; 310; 30; 20 MG/100ML; MG/100ML; MG/100ML; MG/100ML
25 INJECTION, SOLUTION INTRAVENOUS CONTINUOUS
Status: DISCONTINUED | OUTPATIENT
Start: 2018-09-26 | End: 2018-09-26 | Stop reason: HOSPADM

## 2018-09-26 RX ORDER — MORPHINE SULFATE 10 MG/ML
2 INJECTION, SOLUTION INTRAMUSCULAR; INTRAVENOUS
Status: DISCONTINUED | OUTPATIENT
Start: 2018-09-26 | End: 2018-09-26 | Stop reason: HOSPADM

## 2018-09-26 RX ORDER — OXYCODONE AND ACETAMINOPHEN 5; 325 MG/1; MG/1
1 TABLET ORAL AS NEEDED
Status: DISCONTINUED | OUTPATIENT
Start: 2018-09-26 | End: 2018-09-26 | Stop reason: HOSPADM

## 2018-09-26 RX ORDER — PANTOPRAZOLE SODIUM 40 MG/1
40 TABLET, DELAYED RELEASE ORAL DAILY
Status: DISCONTINUED | OUTPATIENT
Start: 2018-09-27 | End: 2018-09-28 | Stop reason: HOSPADM

## 2018-09-26 RX ORDER — DIPHENHYDRAMINE HYDROCHLORIDE 50 MG/ML
12.5 INJECTION, SOLUTION INTRAMUSCULAR; INTRAVENOUS AS NEEDED
Status: DISCONTINUED | OUTPATIENT
Start: 2018-09-26 | End: 2018-09-26 | Stop reason: HOSPADM

## 2018-09-26 RX ORDER — SODIUM CHLORIDE 9 MG/ML
125 INJECTION, SOLUTION INTRAVENOUS CONTINUOUS
Status: DISPENSED | OUTPATIENT
Start: 2018-09-26 | End: 2018-09-27

## 2018-09-26 RX ORDER — CEFAZOLIN SODIUM/WATER 2 G/20 ML
2 SYRINGE (ML) INTRAVENOUS ONCE
Status: COMPLETED | OUTPATIENT
Start: 2018-09-26 | End: 2018-09-26

## 2018-09-26 RX ORDER — BACLOFEN 10 MG/1
10 TABLET ORAL 3 TIMES DAILY
Status: DISCONTINUED | OUTPATIENT
Start: 2018-09-26 | End: 2018-09-28 | Stop reason: HOSPADM

## 2018-09-26 RX ORDER — MORPHINE SULFATE 2 MG/ML
2 INJECTION, SOLUTION INTRAMUSCULAR; INTRAVENOUS
Status: DISCONTINUED | OUTPATIENT
Start: 2018-09-26 | End: 2018-09-27

## 2018-09-26 RX ORDER — LIDOCAINE HYDROCHLORIDE 20 MG/ML
INJECTION, SOLUTION EPIDURAL; INFILTRATION; INTRACAUDAL; PERINEURAL AS NEEDED
Status: DISCONTINUED | OUTPATIENT
Start: 2018-09-26 | End: 2018-09-26 | Stop reason: HOSPADM

## 2018-09-26 RX ORDER — ONDANSETRON 2 MG/ML
4 INJECTION INTRAMUSCULAR; INTRAVENOUS AS NEEDED
Status: DISCONTINUED | OUTPATIENT
Start: 2018-09-26 | End: 2018-09-26 | Stop reason: HOSPADM

## 2018-09-26 RX ORDER — FENTANYL CITRATE 50 UG/ML
50 INJECTION, SOLUTION INTRAMUSCULAR; INTRAVENOUS AS NEEDED
Status: DISCONTINUED | OUTPATIENT
Start: 2018-09-26 | End: 2018-09-26 | Stop reason: HOSPADM

## 2018-09-26 RX ORDER — OXYCODONE HYDROCHLORIDE 5 MG/1
5-10 TABLET ORAL
Status: DISCONTINUED | OUTPATIENT
Start: 2018-09-26 | End: 2018-09-28 | Stop reason: HOSPADM

## 2018-09-26 RX ORDER — AZELASTINE 1 MG/ML
1-2 SPRAY, METERED NASAL DAILY PRN
Status: DISCONTINUED | OUTPATIENT
Start: 2018-09-26 | End: 2018-09-28 | Stop reason: HOSPADM

## 2018-09-26 RX ADMIN — EPHEDRINE SULFATE 10 MG: 50 INJECTION, SOLUTION INTRAVENOUS at 11:20

## 2018-09-26 RX ADMIN — SODIUM CHLORIDE 125 ML/HR: 900 INJECTION, SOLUTION INTRAVENOUS at 19:05

## 2018-09-26 RX ADMIN — DEXAMETHASONE SODIUM PHOSPHATE 4 MG: 4 INJECTION, SOLUTION INTRA-ARTICULAR; INTRALESIONAL; INTRAMUSCULAR; INTRAVENOUS; SOFT TISSUE at 09:05

## 2018-09-26 RX ADMIN — FENTANYL CITRATE 25 MCG: 50 INJECTION, SOLUTION INTRAMUSCULAR; INTRAVENOUS at 12:20

## 2018-09-26 RX ADMIN — FENTANYL CITRATE 25 MCG: 50 INJECTION, SOLUTION INTRAMUSCULAR; INTRAVENOUS at 13:10

## 2018-09-26 RX ADMIN — BACLOFEN 10 MG: 10 TABLET ORAL at 22:20

## 2018-09-26 RX ADMIN — Medication 2 G: at 17:44

## 2018-09-26 RX ADMIN — HYDROMORPHONE HYDROCHLORIDE 0.2 MG: 2 INJECTION, SOLUTION INTRAMUSCULAR; INTRAVENOUS; SUBCUTANEOUS at 09:39

## 2018-09-26 RX ADMIN — Medication 120 MCG: at 11:10

## 2018-09-26 RX ADMIN — Medication 80 MCG: at 10:50

## 2018-09-26 RX ADMIN — Medication 80 MCG: at 09:38

## 2018-09-26 RX ADMIN — EPHEDRINE SULFATE 10 MG: 50 INJECTION, SOLUTION INTRAVENOUS at 09:08

## 2018-09-26 RX ADMIN — MIDAZOLAM HYDROCHLORIDE 1 MG: 1 INJECTION, SOLUTION INTRAMUSCULAR; INTRAVENOUS at 08:20

## 2018-09-26 RX ADMIN — CROMOLYN SODIUM 1 DROP: 40 SOLUTION/ DROPS OPHTHALMIC at 18:59

## 2018-09-26 RX ADMIN — ROCURONIUM BROMIDE 35 MG: 10 INJECTION, SOLUTION INTRAVENOUS at 08:30

## 2018-09-26 RX ADMIN — FENTANYL CITRATE 50 MCG: 50 INJECTION, SOLUTION INTRAMUSCULAR; INTRAVENOUS at 08:22

## 2018-09-26 RX ADMIN — Medication 1 LOZENGE: at 15:57

## 2018-09-26 RX ADMIN — SODIUM CHLORIDE 125 ML/HR: 900 INJECTION, SOLUTION INTRAVENOUS at 12:16

## 2018-09-26 RX ADMIN — ROCURONIUM BROMIDE 10 MG: 10 INJECTION, SOLUTION INTRAVENOUS at 09:51

## 2018-09-26 RX ADMIN — CROMOLYN SODIUM 1 DROP: 40 SOLUTION/ DROPS OPHTHALMIC at 22:20

## 2018-09-26 RX ADMIN — EPHEDRINE SULFATE 10 MG: 50 INJECTION, SOLUTION INTRAVENOUS at 09:16

## 2018-09-26 RX ADMIN — SUCCINYLCHOLINE CHLORIDE 100 MG: 20 INJECTION INTRAMUSCULAR; INTRAVENOUS at 08:22

## 2018-09-26 RX ADMIN — PROPOFOL 150 MG: 10 INJECTION, EMULSION INTRAVENOUS at 08:22

## 2018-09-26 RX ADMIN — Medication 80 MCG: at 09:47

## 2018-09-26 RX ADMIN — GLYCOPYRROLATE 0.2 MG: 0.2 INJECTION INTRAMUSCULAR; INTRAVENOUS at 09:33

## 2018-09-26 RX ADMIN — LIDOCAINE HYDROCHLORIDE 60 MG: 20 INJECTION, SOLUTION EPIDURAL; INFILTRATION; INTRACAUDAL; PERINEURAL at 08:22

## 2018-09-26 RX ADMIN — PROPOFOL 30 MG: 10 INJECTION, EMULSION INTRAVENOUS at 11:42

## 2018-09-26 RX ADMIN — HYDROMORPHONE HYDROCHLORIDE 0.2 MG: 2 INJECTION, SOLUTION INTRAMUSCULAR; INTRAVENOUS; SUBCUTANEOUS at 10:06

## 2018-09-26 RX ADMIN — MORPHINE SULFATE 2 MG: 2 INJECTION, SOLUTION INTRAMUSCULAR; INTRAVENOUS at 15:57

## 2018-09-26 RX ADMIN — FENTANYL CITRATE 50 MCG: 50 INJECTION, SOLUTION INTRAMUSCULAR; INTRAVENOUS at 08:55

## 2018-09-26 RX ADMIN — SODIUM CHLORIDE, SODIUM LACTATE, POTASSIUM CHLORIDE, AND CALCIUM CHLORIDE 25 ML/HR: 600; 310; 30; 20 INJECTION, SOLUTION INTRAVENOUS at 07:23

## 2018-09-26 RX ADMIN — BACLOFEN 10 MG: 10 TABLET ORAL at 15:56

## 2018-09-26 RX ADMIN — OXYCODONE HYDROCHLORIDE AND ACETAMINOPHEN 2 TABLET: 5; 325 TABLET ORAL at 17:02

## 2018-09-26 RX ADMIN — ACETAMINOPHEN 650 MG: 325 TABLET ORAL at 17:44

## 2018-09-26 RX ADMIN — Medication 80 MCG: at 10:28

## 2018-09-26 RX ADMIN — OXYCODONE HYDROCHLORIDE 10 MG: 5 TABLET ORAL at 22:20

## 2018-09-26 RX ADMIN — Medication 120 MCG: at 10:56

## 2018-09-26 RX ADMIN — Medication 80 MCG: at 09:57

## 2018-09-26 RX ADMIN — Medication 80 MCG: at 10:37

## 2018-09-26 RX ADMIN — HYDROMORPHONE HYDROCHLORIDE 0.2 MG: 2 INJECTION, SOLUTION INTRAMUSCULAR; INTRAVENOUS; SUBCUTANEOUS at 09:55

## 2018-09-26 RX ADMIN — ROCURONIUM BROMIDE 5 MG: 10 INJECTION, SOLUTION INTRAVENOUS at 08:22

## 2018-09-26 RX ADMIN — Medication 2 G: at 08:41

## 2018-09-26 RX ADMIN — SODIUM CHLORIDE, SODIUM LACTATE, POTASSIUM CHLORIDE, AND CALCIUM CHLORIDE: 600; 310; 30; 20 INJECTION, SOLUTION INTRAVENOUS at 09:02

## 2018-09-26 RX ADMIN — GABAPENTIN 300 MG: 300 CAPSULE ORAL at 17:44

## 2018-09-26 RX ADMIN — ONDANSETRON 4 MG: 2 INJECTION INTRAMUSCULAR; INTRAVENOUS at 11:25

## 2018-09-26 RX ADMIN — FENTANYL CITRATE 25 MCG: 50 INJECTION, SOLUTION INTRAMUSCULAR; INTRAVENOUS at 12:32

## 2018-09-26 RX ADMIN — Medication 1 LOZENGE: at 19:06

## 2018-09-26 RX ADMIN — MIDAZOLAM HYDROCHLORIDE 1 MG: 1 INJECTION, SOLUTION INTRAMUSCULAR; INTRAVENOUS at 08:13

## 2018-09-26 NOTE — PERIOP NOTES
1235: Received report from Gayathri Guerrero RN and assumed care of patient. Patient resting, will occasionally awake and complain of patient but then goes right back to sleep. RR 8-10.  
 
1310: Patient awakens and reports pain 7 out of 10. Medicated with fentanyl-see MAR.  
 
1315: Report given to Gayathri Guerrero RN to resume care of patient.

## 2018-09-26 NOTE — PROGRESS NOTES
1417 - patient on unit from PACU. VS taken. Patient with c/o pain. No order for pain meds. 1440 - neurosurgery NP on unit. Informed on no pain meds. New orders received. 1500 - Bedside and Verbal shift change report given to tiffanie (oncoming nurse) by Lauri Srinivasan (offgoing nurse). Report included the following information SBAR, Kardex, OR Summary, Procedure Summary, Intake/Output, MAR and Recent Results.

## 2018-09-26 NOTE — PROGRESS NOTES
Physical Therapy Consult received and chart reviewed. Attempting to see patient for initial evaluation. NP requesting therapy be deferred until tomorrow secondary to increased pain.  
Will follow back tomorrow am. 
Alvin Monson, PT

## 2018-09-26 NOTE — PERIOP NOTES
TRANSFER - OUT REPORT: 
 
Verbal report given to Michael Rouse RN on [address]  being transferred to [address] routine post - op Report consisted of patients Situation, Background, Assessment and  
Recommendations(SBAR). Information from the following report(s) SBAR, Kardex, OR Summary, MAR and Cardiac Rhythm sinus magui was reviewed with the receiving nurse. Opportunity for questions and clarification was provided. Patient transported with: 
 O2 @ 2 liters Tech Updated patient's family at 18 and at time of transfer.

## 2018-09-26 NOTE — ROUTINE PROCESS
TRANSFER - IN REPORT: 
 
Verbal report received from Madhavi(name) on [address]  being received from [facility]) for routine post - op Report consisted of patients Situation, Background, Assessment and  
Recommendations(SBAR). Information from the following report(s) SBAR, Kardex, OR Summary, Procedure Summary, Intake/Output, MAR and Recent Results was reviewed with the receiving nurse. Opportunity for questions and clarification was provided. Assessment completed upon patients arrival to unit and care assumed.

## 2018-09-26 NOTE — ANESTHESIA POSTPROCEDURE EVALUATION
Post-Anesthesia Evaluation and Assessment Patient: Darien Arana MRN: [id number]  SSN: [id number] YOB: 1960  Age: 60 y.o. Sex: female Cardiovascular Function/Vital Signs Visit Vitals  /57  Pulse (!) 56  Temp 36.8 °C (98.3 °F)  Resp 11  
 Ht 5' 2\" (1.575 m)  Wt 67.4 kg (148 lb 9.4 oz)  SpO2 95%  BMI 27.18 kg/m2 Patient is status post general anesthesia for Procedure(s): 
L3-4 LAMINECTOMY FUSION, PLIF. Nausea/Vomiting: None Postoperative hydration reviewed and adequate. Pain: 
Pain Scale 1: Numeric (0 - 10) (09/26/18 1310) Pain Intensity 1: 7 (09/26/18 1310) Managed Neurological Status:  
Neuro (WDL): Exceptions to WDL (09/26/18 1215) Neuro Neurologic State: Drowsy; Eyes open to voice (09/26/18 1215) Orientation Level: Oriented X4 (09/26/18 1215) Cognition: Follows commands (09/26/18 1215) Speech: Clear (09/26/18 1215) LUE Motor Response: Purposeful (09/26/18 1215) LLE Motor Response: Purposeful (09/26/18 1215) RUE Motor Response: Purposeful (09/26/18 1215) RLE Motor Response: Purposeful;Weak (09/26/18 1215) At baseline Mental Status and Level of Consciousness: Arousable Pulmonary Status:  
O2 Device: Nasal cannula (09/26/18 1330) Adequate oxygenation and airway patent Complications related to anesthesia: None Post-anesthesia assessment completed. No concerns Signed By: Cadence Whipple MD   
 September 26, 2018

## 2018-09-26 NOTE — H&P
Please see hard copy H&P in chart. I have reviewed the History and Physical report, the patient was examined and there are no interval changes that have occurred in the patient's condition since the H&P was completed.  
 
 
Opal García MD

## 2018-09-26 NOTE — OP NOTES
[facility]  OPERATIVE REPORT    Mike Malone  MR#: [id number]  : 1960  ACCOUNT #: [de-identified]   DATE OF SERVICE: 2018    PREOPERATIVE DIAGNOSES:  Lumbar spondylolisthesis. POSTOPERATIVE DIAGNOSIS:  Lumbar spondylolisthesis. PROCEDURES PERFORMED:  1. L3-L4 midline lumbar fusion. 2.  Laminectomy L3-4.  3.  Interbody fusion L3-4 instrumentation. 4.  Placement interbody cage. 5.  Use of the O-arm. SURGEON:  Humera Givens MD    FIRST ASSISTANT:  OR staff. ANESTHESIA:  General endotracheal.    INDICATIONS:  The patient is a 79-year-old female with severe right leg pain. She has had a spondylolisthesis and stenosis at L3-4 and it was decided to take her to the operating room to decompress her spine. FINDINGS:  Good placement of the hardware. 2 grams Ancef were given prior to incision and orders were written to stop within 24 hours. SCDs used the entire case. With respect to the O-arm, the assessment was done on the workstation images in 3 dimensions with loading the radiographic images that were used for new navigation analysis of the surgical anatomy and pathology in relationship to the surrounding anatomy, and use of the navigation system to identify, placed in the drill holes with appropriate alignment. DESCRIPTION OF PROCEDURE:  The patient was brought to the operating room. After appropriate anesthesia was administered she was turned prone on a Taiwo table. All pressure points adequately padded. The incision was marked out using standard landmarks and infiltrated with 1% lidocaine with 1:100,000 epinephrine. Incision was made using a #10 blade, and Bovie electrocautery was used to come to subcutaneous tissue. A subperiosteal dissection was done along L3 and L4, out to the facet joints for the MIDLF and self-retaining retractors were placed into the wound. X-ray was taken to ensure we were at the right levels.   At this point, an O-arm was brought in using the above parameters and a spin was done, which was then plugged in the computer. This was used for navigation of the drill and tap. Cortical screws, which were from medial to lateral, were drilled using the Midas Rigo, followed by tapping it at both L3 and L4. These were then plugged with Gelfoam for placement of screws later. At this point, a complete laminectomy was done at L3 and L4, taking off the pars and facet joint. There was a lot of lateral recess stenosis. This was taken down. The nerve root was identified exiting as well as traversing which were opened up completely. At this point, posterior lumbar interbody fusion was done bilaterally. The disk space was entered and cleaned out using rongeurs and Kerrisons. The endplates were prepared for grafting. Infuse BMP was then packed anteriorly and Elevate PEEK titanium cages were filled with local bone and tamped into place and then elevated up to the stop height. At this point, a posterolateral fusion was done on the right side at L3-4, decorticating the transverse processes and packing the local bone over this. At this point, the Cortical screws were placed at the L3 and L4 using the previous trajectories. There was good placement of the hardware. O-arm was brought in and a spin was done which showed the hardware in place and the bone with no breach. At this point, a liter of antibiotic-containing solution was irrigated. The rods were placed were placed into the saddles and set screws were placed over these. These were then broken off at the appropriate torque. A drain was placed through a separate stab incision, closed with anatomical layers. The skin incision was reapproximated using staples. Sponge and needle counts were correct x2. COMPLICATIONS:  No complications. ESTIMATED BLOOD LOSS:  50 mL. Dr. Rei Person was present for the entire case from start to finish.       Zaida Borjas MD JULY BUSTAMANTE  D: 09/26/2018 11:45     T: 09/26/2018 13:00  JOB #: [id number]

## 2018-09-26 NOTE — IP AVS SNAPSHOT
[address] [facility] 
[phone] Patient: Micaela Diaz MRN: [id number] About your hospitalization You were admitted on:  September 26, 2018 You last received care in the:  [facility] You were discharged on:  September 28, 2018 Why you were hospitalized Your primary diagnosis was:  S/P Lumbar Spinal Fusion Your diagnoses also included:  Spondylolisthesis Of Lumbar Region Follow-up Information Follow up With Details Comments Contact Info Anh Llamas MD In 2 weeks  [place] [address] 
[phone] Talib Terry MD   [address] [address] [address] 
[phone] [facility]  This is the provider for your rolling walker  [address] [facility] 3 [facility] [address] 
[phone] [address] [facility]  This is your home  health provider  [address] 
1st Floor [facility] [address] [phone] Discharge Orders None A check iggy indicates which time of day the medication should be taken. My Medications START taking these medications Instructions Each Dose to Equal  
 Morning Noon Evening Bedtime  
 naloxone 4 mg/actuation nasal spray Commonly known as:  [employer] Your last dose was: Your next dose is:    
   
   
 1 [place] by IntraNASal route as needed (respiratory depression). Give single spray into one nostril. Call 911. Give additional doses every 2 to 3 minutes alternating nostrils until assistance arrives using a new nasal spray with each dose, if patient does not respond or responds and then relapses. 1 Spray  
    
   
   
   
  
 ondansetron 4 mg disintegrating tablet Commonly known as:  ZOFRAN ODT Your last dose was: Your next dose is: Take 1 Tab by mouth every eight (8) hours as needed for Nausea. 4 mg oxyCODONE IR 5 mg immediate release tablet Commonly known as:  Jeffery Tyson Your last dose was: Your next dose is: Take 1-2 Tabs by mouth every four (4) hours as needed. Max Daily Amount: 60 mg. If insurance prior auth./quantity restrictions apply, refer to and look up diagnosis code one prescription. Partial fill as needed is permitted. Please do not contact prescriber. 5-10 mg  
    
   
   
   
  
 polyethylene glycol 17 gram packet Commonly known as:  Gallito Brooks Your last dose was: Your next dose is: Take 1 Packet by mouth daily as needed (constipation) for up to 15 days. 17 g  
    
   
   
   
  
 senna-docusate 8.6-50 mg per tablet Commonly known as:  Miakatherine Hall Your last dose was: Your next dose is: Take 1 Tab by mouth daily. 1 Tab CONTINUE taking these medications Instructions Each Dose to Equal  
 Morning Noon Evening Bedtime ALPRAZolam 0.5 mg tablet Commonly known as:  Arcadio Magdaleno Your last dose was: Your next dose is:    
   
   
 take 1 tablet by mouth twice a day if needed. Must last 30 days. azelastine 137 mcg (0.1 %) nasal spray Commonly known as:  ASTELIN Your last dose was: Your next dose is:    
   
   
 1 Spray as needed for Rhinitis. Use in each nostril as directed 1 Spray  
    
   
   
   
  
 baclofen 10 mg tablet Commonly known as:  LIORESAL Your last dose was: Your next dose is: Take 1 Tab by mouth three (3) times daily as needed. 10 mg  
    
   
   
   
  
 BC HEADACHE POWDER PO Your last dose was: Your next dose is: Take 1 Package by mouth. 1 Package buPROPion  mg tablet Commonly known as:  Ulises Werner Your last dose was: Your next dose is: Take 1 Tab by mouth every morning. 150 mg  
    
   
   
   
  
 cromolyn 4 % ophthalmic solution Commonly known as:  OPTICROM Your last dose was: Your next dose is:    
   
   
 Administer 1 Drop to both eyes four (4) times daily. Use in affected eye(s) 1 Drop  
    
   
   
   
  
 gabapentin 300 mg capsule Commonly known as:  NEURONTIN Your last dose was: Your next dose is: Take 1 Cap by mouth two (2) times a day. 300 mg  
    
   
   
   
  
 lisinopril-hydroCHLOROthiazide 20-12.5 mg per tablet Commonly known as:  Shadi Newbury Your last dose was: Your next dose is:    
   
   
 take 1 tablet by mouth once daily  
     
   
   
   
  
 pantoprazole 40 mg tablet Commonly known as:  PROTONIX Your last dose was: Your next dose is: Take 1 Tab by mouth daily. 40 mg  
    
   
   
   
  
  
STOP taking these medications   
 diclofenac EC 75 mg EC tablet Commonly known as:  VOLTAREN  
   
  
 ibuprofen 800 mg tablet Commonly known as:  MOTRIN Where to Get Your Medications Information on where to get these meds will be given to you by the nurse or doctor. ! Ask your nurse or doctor about these medications  
  naloxone 4 mg/actuation nasal spray  
 ondansetron 4 mg disintegrating tablet  
 oxyCODONE IR 5 mg immediate release tablet  
 polyethylene glycol 17 gram packet  
 senna-docusate 8.6-50 mg per tablet Opioid Education Prescription Opioids: What You Need to Know: 
 
Prescription opioids can be used to help relieve moderate-to-severe pain and are often prescribed following a surgery or injury, or for certain health conditions. These medications can be an important part of treatment but also come with serious risks. Opioids are strong pain medicines. Examples include hydrocodone, oxycodone, fentanyl, and morphine. Heroin is an example of an illegal opioid.   It is important to work with your health care provider to make sure you are getting the safest, most effective care. WHAT ARE THE RISKS AND SIDE EFFECTS OF OPIOID USE? Prescription opioids carry serious risks of addiction and overdose, especially with prolonged use. An opioid overdose, often marked by slow breathing, can cause sudden death. The use of prescription opioids can have a number of side effects as well, even when taken as directed. · Tolerance-meaning you might need to take more of a medication for the same pain relief · Physical dependence-meaning you have symptoms of withdrawal when the medication is stopped. Withdrawal symptoms can include nausea, sweating, chills, diarrhea, stomach cramps, and muscle aches. Withdrawal can last up to several weeks, depending on which drug you took and how long you took it. · Increased sensitivity to pain · Constipation · Nausea, vomiting, and dry mouth · Sleepiness and dizziness · Confusion · Depression · Low levels of testosterone that can result in lower sex drive, energy, and strength · Itching and sweating RISKS ARE GREATER WITH:      
· History of drug misuse, substance use disorder, or overdose · Mental health conditions (such as depression or anxiety) · Sleep apnea · Older age (72 years or older) · Pregnancy Avoid alcohol while taking prescription opioids. Also, unless specifically advised by your health care provider, medications to avoid include: · Benzodiazepines (such as Xanax or Valium) · Muscle relaxants (such as Soma or Flexeril) · Hypnotics (such as Ambien or Lunesta) · Other prescription opioids KNOW YOUR OPTIONS Talk to your health care provider about ways to manage your pain that don't involve prescription opioids. Some of these options may actually work better and have fewer risks and side effects. Consult your physician before adding or stopping any medications, treatments, or physical activity. Options may include: · Pain relievers such as acetaminophen, ibuprofen, and naproxen · Some medications that are also used for depression or seizures · Physical therapy and exercise · Counseling to help patients learn how to cope better with triggers of pain and stress. · Application of heat or cold compress · Massage therapy · Relaxation techniques Be Informed Make sure you know the name of your medication, how much and how often to take it, and its potential risks & side effects. IF YOU ARE PRESCRIBED OPIOIDS FOR PAIN: 
· Never take opioids in greater amounts or more often than prescribed. Remember the goal is not to be pain-free but to manage your pain at a tolerable level. · Follow up with your primary care provider to: · Work together to create a plan on how to manage your pain. · Talk about ways to help manage your pain that don't involve prescription opioids. · Talk about any and all concerns and side effects. · Help prevent misuse and abuse. · Never sell or share prescription opioids · Help prevent misuse and abuse. · Store prescription opioids in a secure place and out of reach of others (this may include visitors, children, friends, and family). · Safely dispose of unused/unwanted prescription opioids: Find your community drug take-back program or your pharmacy mail-back program, or flush them down the toilet, following guidance from the Food and Drug Administration (www.fda.gov/Drugs/ResourcesForYou). · Visit www.cdc.gov/drugoverdose to learn about the risks of opioid abuse and overdose. · If you believe you may be struggling with addiction, tell your health care provider and ask for guidance or call [employer] at 4-548-891-TMYV. Discharge Instructions SPINE DISCHARGE  INSTRUCTIONS Liz Anderson. Kev Chacko M.D. What can I do? ? The only exercise you should do is walking.   Start by walking twice a day for 5 minutes, then increase by  2-3 minutes every day until you reach 25 minutes twice a day. DO NOT sit for long periods of time (20 minutes at a time for the first couple of weeks, then gradually increase. ? No heavy lifting (5 lbs max); no straining, twisting, or bending. ? No driving until your physician tells you it is ok. It is, however, ok to ride short distances in a car. 
? If you are required to wear a back brace, you may remove it when you are sleeping unless your doctor has advised against it. ? If you are required to wear a cervical collar, you must sleep in it. You can remove it only for showers. What can I eat?  
? You may resume your regular home diet as tolerated. If your throat is sore, you may want to eat soft food for the first few days. When can I take a shower? ? You may shower leaving on your occlusive (waterproof) dressing on allowing water to run over the dressing. The dressing will fall off in 1-5 days. If it doesn't fall off, it may be removed in 5 days. The small pieces of tape (steri strips)  underneath it should stay on. Let water run over them, then pat dry gently. ? Do not take baths or soak in pools. ? You may remove your brace for showering. Medications: 
? Check with your physician before taking any anti-inflammatory medicines (Advil, Aleve, ibuprofen, aspirin). ? Take prescription medicine as directed. DO NOT take more than prescribed. Call your physician if the prescribed dose is not sufficiently controlling your pain. ? It is important to have regular bowel movements. Pain medications may cause constipation. Drink plenty of fluids, get enough fiber in your diet, and use stool softeners and drink prune juice to help prevent constipation. Do not take laxatives if at all possible except in severe situations. It can result in a vicious cycle of constipation and diarrhea.  
? Do not put any ointments or creams on your incision unless directed to do so by your physician. ? Tobacco products should be avoided during the postoperative phase. When do I see the physician again? ? Please call your physicians office as soon as you get home to schedule your 1st post operative appointment. You should be seen approximately two weeks after your surgery. At this appointment you will see your doctors Assistant for a wound check and to answer any questions you may have. 
? You will see your physician approximately six weeks after your surgery. ? If you had a fusion, you will need to get an order for xrays to be taken prior to the six week appointment. These should be done on the day of the appointment or 1-2 days before. ? If you need the number to your doctors office, please request it from your nurse or see below. NOTIFY YOUR PHYSICIAN OF ANY OF THE FOLLOWING: 
 
? Fever above 101º for 24 hrs. 
? Nausea or vomiting. 
? Inability to urinate ? Loss of bowel or bladder function (sudden onset of incontinence) ? Changes in sensation (numbness, tingling, color change) in your extremities ? Pain not relieved by your medicine. ? Redness, swelling or drainage from your incision ? Persistent pain in the calf of either leg ? Development of severe pain FOR APPOINTMENTS OR QUESTIONS CALL: 
 
[facility] [address] [place] [address] 
[phone] [employer] Announcement We are excited to announce that we are making your provider's discharge notes available to you in [employer]. You will see these notes when they are completed and signed by the physician that discharged you from your recent hospital stay. If you have any questions or concerns about any information you see in [employer]Live, please call the Health Information Department where you were seen or reach out to your Primary Care Provider for more information about your plan of care. Introducing [facility]!    
 Dear Amado Truong: 
 Thank you for requesting a [employer] account. Our records indicate that you already have an active [employer] account. You can access your account anytime at [email or web address] Did you know that you can access your hospital and ER discharge instructions at any time in [employer]Cabin? You can also review all of your test results from your hospital stay or ER visit. Additional Information If you have questions, please visit the Frequently Asked Questions section of the [employer] website at [email or web address] Remember, C[employer]Cabin is NOT to be used for urgent needs. For medical emergencies, dial 911. Now available from your iPhone and Android! Introducing Matti Estrada As a Radha Ealsey patient, I wanted to make you aware of our electronic visit tool called Matti Zekesofíaemma. [facility] 24/7 allows you to connect within minutes with a medical provider 24 hours a day, seven days a week via a mobile device or tablet or logging into a secure website from your computer. You can access Matti Zekesofíafin from anywhere in the [place]. A virtual visit might be right for you when you have a simple condition and feel like you just dont want to get out of bed, or cant get away from work for an appointment, when your regular Radha Easley provider is not available (evenings, weekends or holidays), or when youre out of town and need minor care. Electronic visits cost only $49 and if the Radha Easley 24/7 provider determines a prescription is needed to treat your condition, one can be electronically transmitted to a nearby pharmacy*. Please take a moment to enroll today if you have not already done so. The enrollment process is free and takes just a few minutes. To enroll, please download the Radhaeris Easley 24/7 aleksandra to your tablet or phone, or visit [email or web address] to enroll on your computer. And, as an [address] [place] [address] patient with a [employer] account, the results of your visits will be scanned into your electronic medical record and your primary care provider will be able to view the scanned results. We urge you to continue to see your regular [employer] provider for your ongoing medical care. And while your primary care provider may not be the one available when you seek a Matti Gillfin virtual visit, the peace of mind you get from getting a real diagnosis real time can be priceless. For more information on TimeLynessofíafin, view our Frequently Asked Questions (FAQs) at [email or web address]. Sincerely, 
 
Sydney Smith MD 
Chief Medical Officer [facility] Bernie Ramirez *:  certain medications cannot be prescribed via [employer] Un[facility]-Please follow up with your PCP about these lab tests Order Current Status XR SPINE LUMB 2 OR 3 V In process Providers Seen During Your Hospitalization Provider Specialty Primary office phone Sang Larry MD Neurosurgery [phone] Your Primary Care Physician (PCP) Primary Care Physician Office Phone Office Fax Jae Scott [phone] You are allergic to the following No active allergies Recent Documentation Height Weight BMI OB Status Smoking Status 1.575 m 72.3 kg 29.17 kg/m2 Hysterectomy Current Every Day Smoker Emergency Contacts Name Discharge Info Relation Home Work Mobile [address] [facility] CAREGIVER [3] Spouse [3] [phone]  
 Eduardo PEREZ  Spouse [3] [phone] Patient Belongings  The following personal items are in your possession at time of discharge: 
  Dental Appliances: None  Visual Aid: Glasses, With patient      Home Medications: None   Jewelry: None  Clothing: Undergarments, Pants, Footwear, Shirt    Other Valuables: Eyeglasses, Wig  Personal Items Sent to Safe: declined Please provide this summary of care documentation to your next provider. Signatures-by signing, you are acknowledging that this After Visit Summary has been reviewed with you and you have received a copy. Patient Signature:  ____________________________________________________________ Date:  ____________________________________________________________  
  
Mayela Morgan Provider Signature:  ____________________________________________________________ Date:  ____________________________________________________________

## 2018-09-26 NOTE — IP AVS SNAPSHOT
Summary of Care Report The Summary of Care report has been created to help improve care coordination. Users with access to [employer] or "[employer] [address] (Web-based application) may access additional patient information including the Discharge Summary. If you are not currently a [address] user and need more information, please call the number listed below in the [place] [address] section and ask to be connected with Medical Records. Facility Information Name Address Phone [address] [address] [phone] Patient Information Patient Name Sex  Katia Pinedo ([id number]) Female 1960 Discharge Information Admitting Provider Service Area Unit Escobar Fisher MD / [phone] [address] [facility] 3 Orthopedics  [phone] Discharge Provider Discharge Date/Time Discharge Disposition Destination (none) 2018 (Pending) [facility] (none) Patient Language Language ENGLISH [13] Hospital Problems as of 2018  Reviewed: 2018  4:56 PM by Litzy Zapata NP Class Noted - Resolved Last Modified POA Active Problems Spondylolisthesis of lumbar region  2018 - Present 2018 by Escobar Fisher MD Unknown Entered by Escobar Fisher MD  
  * (Principal)S/P lumbar spinal fusion  2018 - Present 2018 by Litzy Zapata NP Unknown Entered by Litzy Zapata NP Non-Hospital Problems as of 2018  Reviewed: 2018  4:56 PM by Litzy Zapata NP Class Noted - Resolved Last Modified Active Problems HTN (hypertension)  2012 - Present 2012 by Staci Rasheed Entered by Staci Rasheed Anxiety  2012 - Present 2012 by Staci Rasheed Entered by Staci Rasheed GERD without esophagitis  6/3/2015 - Present 6/3/2015 by [place] Laser, DO Entered by Qlue Laser, DO You are allergic to the following No active allergies Current Discharge Medication List  
  
START taking these medications Dose & Instructions Dispensing Information Comments  
 naloxone 4 mg/actuation nasal spray Commonly known as:  [employer] Dose:  1 Byron 1 Byron by IntraNASal route as needed (respiratory depression). Give single spray into one nostril. Call 911. Give additional doses every 2 to 3 minutes alternating nostrils until assistance arrives using a new nasal spray with each dose, if patient does not respond or responds and then relapses. Quantity:  1 Each Refills:  0  
   
 ondansetron 4 mg disintegrating tablet Commonly known as:  ZOFRAN ODT Dose:  4 mg Take 1 Tab by mouth every eight (8) hours as needed for Nausea. Quantity:  20 Tab Refills:  0  
   
 oxyCODONE IR 5 mg immediate release tablet Commonly known as:  Karalee Ferries Dose:  5-10 mg Take 1-2 Tabs by mouth every four (4) hours as needed. Max Daily Amount: 60 mg. If insurance prior auth./quantity restrictions apply, refer to and look up diagnosis code one prescription. Partial fill as needed is permitted. Please do not contact prescriber. Quantity:  60 Tab Refills:  0  
   
 polyethylene glycol 17 gram packet Commonly known as:  Beola Woodland Dose:  17 g Take 1 Packet by mouth daily as needed (constipation) for up to 15 days. Quantity:  15 Packet Refills:  0  
   
 senna-docusate 8.6-50 mg per tablet Commonly known as:  Stephie Milner Dose:  1 Tab Take 1 Tab by mouth daily. Quantity:  30 Tab Refills:  0 CONTINUE these medications which have NOT CHANGED Dose & Instructions Dispensing Information Comments ALPRAZolam 0.5 mg tablet Commonly known as:  XANAX  
 take 1 tablet by mouth twice a day if needed. Must last 30 days. Quantity:  60 Tab Refills:  2  
   
 azelastine 137 mcg (0.1 %) nasal spray Commonly known as:  ASTELIN Dose:  1 Spray 1 Spray as needed for Rhinitis. Use in each nostril as directed Refills:  0  
   
 baclofen 10 mg tablet Commonly known as:  LIORESAL Dose:  10 mg Take 1 Tab by mouth three (3) times daily as needed. Quantity:  30 Tab Refills:  2 BC HEADACHE POWDER PO Dose:  1 Package Take 1 Package by mouth. Refills:  0  
   
 buPROPion  mg tablet Commonly known as:  Linnea Sizer Dose:  150 mg Take 1 Tab by mouth every morning. Quantity:  30 Tab Refills:  6  
   
 cromolyn 4 % ophthalmic solution Commonly known as:  OPTICROM Dose:  1 Drop Administer 1 Drop to both eyes four (4) times daily. Use in affected eye(s) Quantity:  10 mL Refills:  0  
   
 gabapentin 300 mg capsule Commonly known as:  NEURONTIN Dose:  300 mg Take 1 Cap by mouth two (2) times a day. Quantity:  60 Cap Refills:  1  
   
 lisinopril-hydroCHLOROthiazide 20-12.5 mg per tablet Commonly known as:  PRINZIDE, ZESTORETIC  
 take 1 tablet by mouth once daily Quantity:  30 Tab Refills:  5  
   
 pantoprazole 40 mg tablet Commonly known as:  PROTONIX Dose:  40 mg Take 1 Tab by mouth daily. Quantity:  30 Tab Refills:  5 STOP taking these medications Comments  
 diclofenac EC 75 mg EC tablet Commonly known as:  VOLTAREN  
   
   
 ibuprofen 800 mg tablet Commonly known as:  MOTRIN Surgery Information ID Date/Time Status Primary Surgeon All Procedures Location [id number] 9/26/2018 [address] [place] Canela MD L3-4 LAMINECTOMY FUSION, PLIF [facility] MAIN OR Follow-up Information Follow up With Details Comments Contact Info Marleen Colvin MD In 2 weeks  [place] [address] 
[phone] Yessica Terry MD   [address] [address] [place] [address] 
[phone] [facility]  This is the provider for your rolling walker  [address] [place] [address] 
[phone] [address] [place] [address]  This is your home  health provider  [address] 
[address] [place] [address] 
[phone] Discharge Instructions SPINE DISCHARGE  INSTRUCTIONS Serena Cook. Gerber Liu M.D. What can I do? ? The only exercise you should do is walking. Start by walking twice a day for 5 minutes, then increase by  2-3 minutes every day until you reach 25 minutes twice a day. DO NOT sit for long periods of time (20 minutes at a time for the first couple of weeks, then gradually increase. ? No heavy lifting (5 lbs max); no straining, twisting, or bending. ? No driving until your physician tells you it is ok. It is, however, ok to ride short distances in a car. 
? If you are required to wear a back brace, you may remove it when you are sleeping unless your doctor has advised against it. ? If you are required to wear a cervical collar, you must sleep in it. You can remove it only for showers. What can I eat?  
? You may resume your regular home diet as tolerated. If your throat is sore, you may want to eat soft food for the first few days. When can I take a shower? ? You may shower leaving on your occlusive (waterproof) dressing on allowing water to run over the dressing. The dressing will fall off in 1-5 days. If it doesn't fall off, it may be removed in 5 days. The small pieces of tape (steri strips)  underneath it should stay on. Let water run over them, then pat dry gently. ? Do not take baths or soak in pools. ? You may remove your brace for showering. Medications: 
? Check with your physician before taking any anti-inflammatory medicines (Advil, Aleve, ibuprofen, aspirin). ? Take prescription medicine as directed. DO NOT take more than prescribed.   Call your physician if the prescribed dose is not sufficiently controlling your pain. ? It is important to have regular bowel movements. Pain medications may cause constipation. Drink plenty of fluids, get enough fiber in your diet, and use stool softeners and drink prune juice to help prevent constipation. Do not take laxatives if at all possible except in severe situations. It can result in a vicious cycle of constipation and diarrhea. ? Do not put any ointments or creams on your incision unless directed to do so by your physician. ? Tobacco products should be avoided during the postoperative phase. When do I see the physician again? ? Please call your physicians office as soon as you get home to schedule your 1st post operative appointment. You should be seen approximately two weeks after your surgery. At this appointment you will see your doctors Assistant for a wound check and to answer any questions you may have. 
? You will see your physician approximately six weeks after your surgery. ? If you had a fusion, you will need to get an order for xrays to be taken prior to the six week appointment. These should be done on the day of the appointment or 1-2 days before. ? If you need the number to your doctors office, please request it from your nurse or see below. NOTIFY YOUR PHYSICIAN OF ANY OF THE FOLLOWING: 
 
? Fever above 101º for 24 hrs. 
? Nausea or vomiting. 
? Inability to urinate ? Loss of bowel or bladder function (sudden onset of incontinence) ? Changes in sensation (numbness, tingling, color change) in your extremities ? Pain not relieved by your medicine. ? Redness, swelling or drainage from your incision ? Persistent pain in the calf of either leg ? Development of severe pain FOR APPOINTMENTS OR QUESTIONS CALL: 
 
Neurosurgical FLORENCE Ho [address] [place] [address] 
[phone] Chart Review Routing History No Routing History on File

## 2018-09-26 NOTE — ANESTHESIA PREPROCEDURE EVALUATION
Anesthetic History No history of anesthetic complications Review of Systems / Medical History Patient summary reviewed, nursing notes reviewed and pertinent labs reviewed Pulmonary Smoker Neuro/Psych Psychiatric history Comments: Chronic pain (G89.29) 
paresthesias  Cardiovascular Hypertension Exercise tolerance: <4 METS 
  
GI/Hepatic/Renal 
  
GERD: well controlled Comments: HX GASTRIC BYPASS ([id number]) Endo/Other Within defined limits Other Findings Physical Exam 
 
Airway Mallampati: II 
TM Distance: 4 - 6 cm Neck ROM: decreased range of motion Mouth opening: Normal 
 
 Cardiovascular Regular rate and rhythm,  S1 and S2 normal,  no murmur, click, rub, or gallop Rhythm: regular Rate: normal 
 
 
 
 Dental 
 
 
Comments: Gingival recession Pulmonary Breath sounds clear to auscultation Abdominal 
GI exam deferred Other Findings Anesthetic Plan ASA: 3 Anesthesia type: general 
 
 
 
 
Induction: Intravenous Anesthetic plan and risks discussed with: Patient

## 2018-09-26 NOTE — PERIOP NOTES
Handoff Report from Operating Room to PACU Report received from Kathleen Centeno RN and Fabian Koroma CRNA regarding Roverto Veras. Surgeon(s): 
Marta Aguilar MD  And Procedure(s) (LRB): 
L3-4 LAMINECTOMY FUSION, PLIF (N/A)  confirmed  
with allergies, drains and dressings discussed. Anesthesia type, drugs, patient history, complications, estimated blood loss, vital signs, intake and output, and last pain medication, lines, reversal medications and temperature were reviewed.

## 2018-09-26 NOTE — PROGRESS NOTES
Ortho/ NeuroSurgery NP Note POD# 0  s/p L3-4 LAMINECTOMY FUSION, PLIF Pt resting in bed. Having severe pain presently, nursing at bedside with pain medication. Pre-op had right buttock and groin pain that radiated down the medial leg to the toes. This was stiff in the morning, then would ease up before worsening again with activity throughout the day. VSS Afebrile. Patient has not had something to eat/drink. No nausea. Most Recent Labs:  
Lab Results Component Value Date/Time HGB 11.3 (L) 09/21/2018 11:50 AM  
 Hemoglobin A1c 4.6 09/21/2018 11:50 AM  
 
 
Body mass index is 29.17 kg/(m^2). Reference: BMI greater than 30 is classified as obesity and greater than 40 is classified as morbid obesity. STOP BANG Score: 2 Voiding status: Gonzalez in place. Dressing c.d.i Calves soft and supple; No pain with passive stretch Sensation and motor intact SCDs for mechanical DVT proph Plan: 
1) PT BID starting tomorrow due to unrelieved pain. Patient encouraged to mobilize with nursing to bedside or chair this evening once pain controlled. 2) Shannan-op Antibiotics Ancef 3) Discharge plans to home with her  possibly tomorrow depending on mobility, pain and drain output.   
 
Austin Shanks, ARPITA

## 2018-09-26 NOTE — BRIEF OP NOTE
BRIEF OPERATIVE NOTE Date of Procedure: 9/26/2018 Preoperative Diagnosis: LUMBAR SPONDYLOLITHESIS Postoperative Diagnosis: LUMBAR SPONDYLOLITHESIS Procedure(s): 
L3-4 MDLIF, Interbody fusion,  
Surgeon(s) and Role: Dev Villalba MD - Primary Surgical Assistant: or staff Johan Shelby Anesthesia: General  
Estimated Blood Loss: 50 Specimens: * No specimens in log * Findings: good position hardware Complications: none Implants:  
Implant Name Type Inv. Item Serial No.  Lot No. LRB No. Used Action INFUSE BONE GRAFT XSMALL KIT   NA [facility] [id number] N/A 1 Implanted SCR SET SPNE BRK-OFF 5.5MM TI --  - SNA  SCR SET SPNE BRK-OFF 5.5MM TI --  NA [facility] NA N/A 4 Implanted SCR ZACH 5.5 MAS 5.0X35MM CC -- CD [employer]ORIZON SOLERA - SNA  SCR ZACH 5.5 MAS 5.0X35MM CC -- CD HORIZON SOLERA NA [facility] NA N/A 1 Implanted SPACER ELEVATE X-LORDTC 23X7MM -- ELEVATE SPINAL SYSTEM - SNA  SPACER ELEVATE X-LORDTC 23X7MM -- ELEVATE SPINAL SYSTEM NA [facility] [id number] N/A 1 Implanted SPACER ELEVATE X-LORDTC 23X7MM -- ELEVATE SPINAL SYSTEM - SNA   SPACER ELEVATE X-LORDTC 23X7MM -- ELEVATE SPINAL SYSTEM NA [facility] [id number] N/A 1 Implanted

## 2018-09-27 LAB — HGB BLD-MCNC: 9 G/DL (ref 11.5–16)

## 2018-09-27 PROCEDURE — 97162 PT EVAL MOD COMPLEX 30 MIN: CPT

## 2018-09-27 PROCEDURE — G8978 MOBILITY CURRENT STATUS: HCPCS

## 2018-09-27 PROCEDURE — 74011250637 HC RX REV CODE- 250/637: Performed by: NURSE PRACTITIONER

## 2018-09-27 PROCEDURE — 94760 N-INVAS EAR/PLS OXIMETRY 1: CPT

## 2018-09-27 PROCEDURE — G8987 SELF CARE CURRENT STATUS: HCPCS | Performed by: OCCUPATIONAL THERAPIST

## 2018-09-27 PROCEDURE — 97535 SELF CARE MNGMENT TRAINING: CPT | Performed by: OCCUPATIONAL THERAPIST

## 2018-09-27 PROCEDURE — 36415 COLL VENOUS BLD VENIPUNCTURE: CPT | Performed by: SPECIALIST

## 2018-09-27 PROCEDURE — 74011000250 HC RX REV CODE- 250: Performed by: SPECIALIST

## 2018-09-27 PROCEDURE — 85018 HEMOGLOBIN: CPT | Performed by: SPECIALIST

## 2018-09-27 PROCEDURE — 65270000029 HC RM PRIVATE

## 2018-09-27 PROCEDURE — 74011250637 HC RX REV CODE- 250/637: Performed by: SPECIALIST

## 2018-09-27 PROCEDURE — G8988 SELF CARE GOAL STATUS: HCPCS | Performed by: OCCUPATIONAL THERAPIST

## 2018-09-27 PROCEDURE — G8979 MOBILITY GOAL STATUS: HCPCS

## 2018-09-27 PROCEDURE — 74011250636 HC RX REV CODE- 250/636: Performed by: SPECIALIST

## 2018-09-27 PROCEDURE — 77010033678 HC OXYGEN DAILY

## 2018-09-27 PROCEDURE — 97165 OT EVAL LOW COMPLEX 30 MIN: CPT | Performed by: OCCUPATIONAL THERAPIST

## 2018-09-27 PROCEDURE — 97530 THERAPEUTIC ACTIVITIES: CPT | Performed by: OCCUPATIONAL THERAPIST

## 2018-09-27 PROCEDURE — 97116 GAIT TRAINING THERAPY: CPT

## 2018-09-27 RX ADMIN — ONDANSETRON 4 MG: 2 INJECTION INTRAMUSCULAR; INTRAVENOUS at 08:54

## 2018-09-27 RX ADMIN — MORPHINE SULFATE 2 MG: 2 INJECTION, SOLUTION INTRAMUSCULAR; INTRAVENOUS at 01:15

## 2018-09-27 RX ADMIN — Medication 10 ML: at 21:42

## 2018-09-27 RX ADMIN — BUPROPION HYDROCHLORIDE 150 MG: 150 TABLET, FILM COATED, EXTENDED RELEASE ORAL at 06:12

## 2018-09-27 RX ADMIN — Medication 10 ML: at 06:12

## 2018-09-27 RX ADMIN — BACLOFEN 10 MG: 10 TABLET ORAL at 21:42

## 2018-09-27 RX ADMIN — BACLOFEN 10 MG: 10 TABLET ORAL at 16:58

## 2018-09-27 RX ADMIN — OXYCODONE HYDROCHLORIDE 5 MG: 5 TABLET ORAL at 11:33

## 2018-09-27 RX ADMIN — MORPHINE SULFATE 2 MG: 2 INJECTION, SOLUTION INTRAMUSCULAR; INTRAVENOUS at 06:11

## 2018-09-27 RX ADMIN — ACETAMINOPHEN 650 MG: 325 TABLET ORAL at 06:12

## 2018-09-27 RX ADMIN — BACLOFEN 10 MG: 10 TABLET ORAL at 11:32

## 2018-09-27 RX ADMIN — ACETAMINOPHEN 650 MG: 325 TABLET ORAL at 00:52

## 2018-09-27 RX ADMIN — LISINOPRIL: 20 TABLET ORAL at 11:32

## 2018-09-27 RX ADMIN — OXYCODONE HYDROCHLORIDE 10 MG: 5 TABLET ORAL at 04:21

## 2018-09-27 RX ADMIN — ACETAMINOPHEN 650 MG: 325 TABLET ORAL at 17:01

## 2018-09-27 RX ADMIN — Medication 2 G: at 00:52

## 2018-09-27 RX ADMIN — PROCHLORPERAZINE EDISYLATE 5 MG: 5 INJECTION INTRAMUSCULAR; INTRAVENOUS at 11:35

## 2018-09-27 RX ADMIN — PANTOPRAZOLE SODIUM 40 MG: 40 TABLET, DELAYED RELEASE ORAL at 11:32

## 2018-09-27 RX ADMIN — GABAPENTIN 300 MG: 300 CAPSULE ORAL at 11:31

## 2018-09-27 RX ADMIN — GABAPENTIN 300 MG: 300 CAPSULE ORAL at 17:01

## 2018-09-27 RX ADMIN — ACETAMINOPHEN 650 MG: 325 TABLET ORAL at 13:10

## 2018-09-27 RX ADMIN — SODIUM CHLORIDE 125 ML/HR: 900 INJECTION, SOLUTION INTRAVENOUS at 02:06

## 2018-09-27 RX ADMIN — Medication 10 ML: at 14:00

## 2018-09-27 RX ADMIN — CROMOLYN SODIUM 1 DROP: 40 SOLUTION/ DROPS OPHTHALMIC at 21:42

## 2018-09-27 NOTE — PROGRESS NOTES
Spiritual Care Partner Volunteer visited patient in Ortho unit on September 27, 2018. Documented by: 
PONCHO Greco, [address] [facility],  [facility]  Paging Service  [phone])

## 2018-09-27 NOTE — PROGRESS NOTES
Bedside and Verbal shift change report given to Lashaun Garcia (oncoming nurse) by Josue Couch RN (offgoing nurse). Report included the following information SBAR, Kardex, Intake/Output, MAR, Accordion and Recent Results.

## 2018-09-27 NOTE — PROGRESS NOTES
Occupational Therapy Goals Initiated 9/27/2018 1. Patient will perform grooming standing at sink with independence within 7 day(s). 2.  Patient will perform upper body dressing with independence within 7 day(s). 3.  Patient will perform lower body dressing with modified independence within 7 day(s). 4.  Patient will perform toilet transfers with independence within 7 day(s). 5.  Patient will perform all aspects of toileting with modified independence within 7 day(s). 6.  Patient will perform ADL setup with independence within 7 day(s). 7.  Patient will perform tub transfer with supervision within 7 days. Occupational Therapy EVALUATION Patient: Micaela Diaz (56 y.o. female) Date: 9/27/2018 Primary Diagnosis: LUMBAR SPONDYLOLITHESIS Spondylolisthesis of lumbar region Procedure(s) (LRB): 
L3-4 LAMINECTOMY FUSION, PLIF (N/A) 1 Day Post-Op Precautions: Spine precautions ASSESSMENT : 
Based on the objective data described below, the patient presents with post op spine precautions, lumbars surgical site pain, GW, decreased activity tolerance and decreased balance which is impairing her functional independence. Tolerance for activity performed during this evaluation was mainly limited by N/V. She is functioning below her independent baseline, now performing ADLs at an independent to min A level and is CGA for functional mobility. Patient will benefit from skilled intervention to address the above impairments, but should progress well once her N/V resolve. No OT needs anticipated for after discharge. Patients rehabilitation potential is considered to be Good Factors which may influence rehabilitation potential include:  
[]             None noted []             Mental ability/status []             Medical condition []             Home/family situation and support systems []             Safety awareness []             Pain tolerance/management 
[]             Other: PLAN : 
 Recommendations and Planned Interventions: 
[x]               Self Care Training                  []        Therapeutic Activities [x]               Functional Mobility Training    []        Cognitive Retraining 
[]               Therapeutic Exercises           [x]        Endurance Activities [x]               Balance Training                   []        Neuromuscular Re-Education []               Visual/Perceptual Training     [x]   Home Safety Training 
[x]               Patient Education                 [x]        Family Training/Education []               Other (comment): Frequency/Duration: Patient will be followed by occupational therapy 5 times a week to address goals. Discharge Recommendations: Anticipate no OT needs Further Equipment Recommendations for Discharge: none anticipated SUBJECTIVE:  
Patient stated I'm going to throw up.  OBJECTIVE DATA SUMMARY:  
 
Past Medical History:  
Diagnosis Date  Chronic pain  Hypertension  Non-nicotine vapor product user  Psychiatric disorder   
 anxiety Past Surgical History:  
Procedure Laterality Date  HC LAP BAND ADJUST PROCEDURE    
 HX BREAST BIOPSY Left   
 years ago  HX CERVICAL FUSION    
 HX CHOLECYSTECTOMY  HX GASTRIC BYPASS  HX HYSTERECTOMY  HX ORTHOPAEDIC    
 foot surgery--bunions--bilateral  
 HX ORTHOPAEDIC    
 ACDF  REMOVAL GALLBLADDER Prior Level of Function/Home Situation: independent and working Expanded or extensive additional review of patient history:  
 
Home Situation Home Environment: Private residence # Steps to Enter: 5 Rails to Enter: Yes Hand Rails : Bilateral 
One/Two Story Residence: One story Living Alone: No 
Support Systems: Spouse/Significant Other/Partner, Child(susy) Tub or Shower Type: Tub/Shower combination 
[x]  Right hand dominant   []  Left hand dominant Cognitive/Behavioral Status: 
Neurologic State: Alert Orientation Level: Oriented X4 
 Cognition: Appropriate decision making; Appropriate for age attention/concentration; Follows commands Safety/Judgement: Awareness of environment; Insight into deficits Vision/Perceptual:   
Acuity: Within Defined Limits Corrective Lenses: Glasses Range of Motion: 
AROM: Generally decreased, functional 
  
Strength: 
Strength: Generally decreased, functional 
Coordination: 
Coordination: Within functional limits Fine Motor Skills-Upper: Left Intact; Right Intact Gross Motor Skills-Upper: Left Intact; Right Intact Tone & Sensation: 
Tone: Normal 
Balance: 
Sitting: Intact Standing: Impaired Standing - Static: Good Standing - Dynamic : Fair Functional Mobility and Transfers for ADLs: 
Bed Mobility: 
Rolling: Contact guard assistance Supine to Sit: Contact guard assistance; Additional time Scooting: Supervision Transfers: 
Sit to Stand: Contact guard assistance Bed to Chair: Contact guard assistance ADL Assessment: 
Feeding: Independent Oral Facial Hygiene/Grooming: Contact guard assistance Bathing: Minimum assistance Upper Body Dressing: Supervision;Setup Lower Body Dressing: Minimum assistance Toileting: Minimum assistance Functional Measure: 
Barthel Index: 
 
Bathin Bladder: 10 Bowels: 10 
Groomin Dressin Feeding: 10 Mobility: 0 Stairs: 0 Toilet Use: 5 Transfer (Bed to Chair and Back): 10 Total: 50 Barthel and G-code impairment scale: 
Percentage of impairment CH 
0% CI 
1-19% CJ 
20-39% CK 
40-59% CL 
60-79% CM 
80-99% CN 
100% Barthel Score 0-100 100 99-80 79-60 59-40 20-39 1-19 
 0 Barthel Score 0-20 20 17-19 13-16 9-12 5-8 1-4 0 The Barthel ADL Index: Guidelines 1. The index should be used as a record of what a patient does, not as a record of what a patient could do. 2. The main aim is to establish degree of independence from any help, physical or verbal, however minor and for whatever reason. 3. The need for supervision renders the patient not independent. 4. A patient's performance should be established using the best available evidence. Asking the patient, friends/relatives and nurses are the usual sources, but direct observation and common sense are also important. However direct testing is not needed. 5. Usually the patient's performance over the preceding 24-48 hours is important, but occasionally longer periods will be relevant. 6. Middle categories imply that the patient supplies over 50 per cent of the effort. 7. Use of aids to be independent is allowed. Amanda Benavides., Barthel, DReannaW. ([id number]). Functional evaluation: the Barthel Index. [address] [facility] (14)2. ArvBrady ashley RICKY Saleem, Armand Olmsetad, Charles Welch, Danay, [facility] (1999). Measuring the change indisability after inpatient rehabilitation; comparison of the responsiveness of the Barthel Index and Functional [place] Measure. Journal of Neurology, Neurosurgery, and Psychiatry, 66(4), 866-791. Jass Hughes, N.J.A, ERMELINDA Dunn, & Jerzy Freeman MRYAN. (2004.) Assessment of post-stroke quality of life in cost-effectiveness studies: The usefulness of the Barthel Index and the EuroQoL-5D. [facility], 13, 201-92 In compliance with CMSs Claims Based Outcome Reporting, the following G-code set was chosen for this patient based on their primary functional limitation being treated: The outcome measure chosen to determine the severity of the functional limitation was the Barthel Index with a score of 50/100 which was correlated with the impairment scale. ? Self Care:  
  - CURRENT STATUS: CK - 40%-59% impaired, limited or restricted  - GOAL STATUS:  CJ - 20%-39% impaired, limited or restricted   - D/C STATUS:  ---------------To be determined---------------  
 
 
 
ADL Intervention and task modifications: 
Reviewed post op spine precautions as they relate to bed mobility and ADL performance. Instructed patient to avoid reaching across the midline of her body to prevent trunk rotation during bathing, dressing and the performance of item retrieval during ADLs and meal prep. Patient instructed in log rolling technique for bed mobility and use of leg crossed technique for LB dressing to prevent trunk rotation and flexion. Instructed patient and family to have needed items placed at a level between shoulder and knee height , so they can be easily accessed to promote proper body mechanics and to minimize risk for LOB. Patient able to verbalize understanding upon completion of this education/training, but needs reinforcement. Lower Body Dressing Assistance Socks: Supervision/set-up; Compensatory technique training Leg Crossed Method Used: Yes Position Performed: Seated edge of bed Cues: Verbal cues provided;Visual cues provided;Don;Evangelist Cognitive Retraining Safety/Judgement: Awareness of environment; Insight into deficits Pain: 
Pain Scale 1: Numeric (0 - 10) Pain Intensity 1: 5 Pain Location 1: Back Pain Description 1: Aching Pain Intervention(s) 1: Medication (see MAR) Activity Tolerance:  
VSS, fair 2/2 N/V After treatment:  
[x] Patient left in no apparent distress sitting up in chair 
[] Patient left in no apparent distress in bed 
[x] Call bell left within reach [x] Nursing notified 
[] Caregiver present 
[] Bed alarm activated COMMUNICATION/EDUCATION:  
The patients plan of care was discussed with: Registered Nurse and NP. [x] Home safety education was provided and the patient/caregiver indicated understanding. [x] Patient/family have participated as able in goal setting and plan of care. [x] Patient/family agree to work toward stated goals and plan of care. [] Patient understands intent and goals of therapy, but is neutral about his/her participation. [] Patient is unable to participate in goal setting and plan of care. This patients plan of care is appropriate for delegation to ALISTAIR. Thank you for this referral. 
Soren Gomez, OTR/L Time Calculation: 37 mins

## 2018-09-27 NOTE — PROGRESS NOTES
Problem: Falls - Risk of 
Goal: *Absence of Falls Document Albert Maier Fall Risk and appropriate interventions in the flowsheet. Outcome: Progressing Towards Goal 
Fall Risk Interventions: 
Mobility Interventions: Patient to call before getting OOB, Utilize walker, cane, or other assistive device Medication Interventions: Patient to call before getting OOB, Assess postural VS orthostatic hypotension, Teach patient to arise slowly Elimination Interventions: Call light in reach, Patient to call for help with toileting needs

## 2018-09-27 NOTE — DISCHARGE INSTRUCTIONS
SPINE DISCHARGE  INSTRUCTIONS    Angélica Clemens. David Olson M.D. What can I do?  The only exercise you should do is walking. Start by walking twice a day for 5 minutes, then increase by  2-3 minutes every day until you reach 25 minutes twice a day. DO NOT sit for long periods of time (20 minutes at a time for the first couple of weeks, then gradually increase.  No heavy lifting (5 lbs max); no straining, twisting, or bending.  No driving until your physician tells you it is ok. It is, however, ok to ride short distances in a car.  If you are required to wear a back brace, you may remove it when you are sleeping unless your doctor has advised against it.  If you are required to wear a cervical collar, you must sleep in it. You can remove it only for showers. What can I eat?  You may resume your regular home diet as tolerated. If your throat is sore, you may want to eat soft food for the first few days. When can I take a shower?  You may shower leaving on your occlusive (waterproof) dressing on allowing water to run over the dressing. The dressing will fall off in 1-5 days. If it doesn't fall off, it may be removed in 5 days. The small pieces of tape (steri strips)  underneath it should stay on. Let water run over them, then pat dry gently.  Do not take baths or soak in pools.  You may remove your brace for showering. Medications:   Check with your physician before taking any anti-inflammatory medicines (Advil, Aleve, ibuprofen, aspirin).  Take prescription medicine as directed. DO NOT take more than prescribed. Call your physician if the prescribed dose is not sufficiently controlling your pain.  It is important to have regular bowel movements. Pain medications may cause constipation. Drink plenty of fluids, get enough fiber in your diet, and use stool softeners and drink prune juice to help prevent constipation.   Do not take laxatives if at all possible except in severe situations. It can result in a vicious cycle of constipation and diarrhea.  Do not put any ointments or creams on your incision unless directed to do so by your physician.  Tobacco products should be avoided during the postoperative phase. When do I see the physician again?  Please call your physicians office as soon as you get home to schedule your 1st post operative appointment. You should be seen approximately two weeks after your surgery. At this appointment you will see your doctors Assistant for a wound check and to answer any questions you may have.  You will see your physician approximately six weeks after your surgery.  If you had a fusion, you will need to get an order for xrays to be taken prior to the six week appointment. These should be done on the day of the appointment or 1-2 days before.  If you need the number to your doctors office, please request it from your nurse or see below. NOTIFY YOUR PHYSICIAN OF ANY OF THE FOLLOWING:     Fever above 101º for 24 hrs.  Nausea or vomiting.  Inability to urinate   Loss of bowel or bladder function (sudden onset of incontinence)   Changes in sensation (numbness, tingling, color change) in your extremities   Pain not relieved by your medicine.    Redness, swelling or drainage from your incision   Persistent pain in the calf of either leg   Development of severe pain      FOR APPOINTMENTS OR QUESTIONS CALL:    [facility] [address] [place] [address]  [phone]

## 2018-09-27 NOTE — PROGRESS NOTES
Chart reviewed and spoke with her RN. Patient currently still having nausea and vomiting. Will defer until early afternoon.  
 
Randall Jules, PT

## 2018-09-27 NOTE — PROGRESS NOTES
Ortho / Neurosurgery NP Note POD# 1  s/p L3-4 LAMINECTOMY FUSION, PLIF Pt seen in bed with report of nausea and vomiting Pt resting in bed. Reports pain is relatively well controlled with medications however is feeling sick and unable to tolerate PO. Pt active episode of emesis while assessing. Requested RN provide Zofran and recommendation to continue IVF to avoid dehydration until tolerating PO. Review of MAR with NP Lenore Pizano and recommendation to discontinue Percocet and Morphine as may be causative of nausea, continue with Oxycodone and Tylenol and Zofran as needed. VSS Afebrile. Voiding status: needs to void post borrego removal.  
 
Labs Lab Results Component Value Date/Time HGB 9.0 (L) 09/27/2018 04:23 AM  
  
Lab Results Component Value Date/Time INR 1.1 09/21/2018 11:50 AM  
  
 
Body mass index is 29.17 kg/(m^2). : A BMI > 30 is classified as obesity and > 40 is classified as morbid obesity. Dressing c.d.i Drain Intact with small amount of bloody output continuing Calves soft and supple; No pain with passive stretch Sensation equal and motor intact 5/5 BLE 
SCDs for mechanical DVT proph while in bed PLAN: 
1) PT BID 
2) SCD'sfor DVT Prophylaxis 3) GI Prophylaxis - Protonix 4) Readniess for discharge: 
   [x] Vital Signs stable  
 [x] Hgb stable  
 [] + Voiding - monitor for void this morning 
 [x] Wound intact, drainage minimal  
 [] Tolerating PO intake  - Zofran for nausea and vomiting, discussed taking pills with crackers or something small in her stomach to help 
 [] Cleared by PT (OT if applicable) [] Stair training completed (if applicable) [] Independent / Contact Guard Assist (household distance) [] Bed mobility [] Car transfers  
  [] ADLs [x] Adequate pain control on oral medication alone Continue with therapies today, monitor for voiding status and management of pain, nausea, and vomiting. Evaluate for discharge tomorrow. Ector Reed 
BSN, RN, ACNP Student Addendum: Abdomen is soft and round, bowel sounds present all quadrants, non tender to palpation.

## 2018-09-27 NOTE — PROGRESS NOTES
Ortho/ NeuroSurgery NP Note 
 
s/p L3-4 LAMINECTOMY FUSION, PLIF on 9/26/2018 Pt seen with Dr. Aung Powers Pt resting in bed. No complaints. Back pain fluctuates- pain medication helping. VSS Afebrile. Gonzalez removed. Labs Lab Results Component Value Date/Time HGB 9.0 (L) 09/27/2018 04:23 AM  
  
 
Body mass index is 29.17 kg/(m^2). Reference: BMI greater than 30 is classified as obesity and greater than 40 is classified as morbid obesity. Dressing c.d.i, cryotherapy Drain  in place. 175 overnight. Calves soft and supple; No pain with passive stretch Sensation and motor intact SCDs for mechanical DVT proph while in bed PLAN: 
1) PT BID 2) Readiness for discharge: 
   [x] Vital Signs stable  
 [x] Hgb stable  
 [] + Voiding  
 [] Incision intact, drainage minimal  
 [x] Tolerating PO intake   
 [] Cleared by PT (OT if applicable) [] Stair training completed (if applicable) [] Independent/Contact Guard ambulation with assistive device (household distance) [] Bed mobility [] Car transfers  
  [] ADLs  
 [] Adequate pain control on oral medication alone Odessa Beth, NP

## 2018-09-27 NOTE — PROGRESS NOTES
Problem: Mobility Impaired (Adult and Pediatric) Goal: *Acute Goals and Plan of Care (Insert Text) Physical Therapy Goals Initiated 9/27/2018 1. Patient will move from supine to sit and sit to supine , scoot up and down and roll side to side in bed with modified independence using log roll technique within 4 days. 2. Patient will perform sit to stand with independence within 4 days. 3. Patient will ambulate with modified independence for 450 feet with the least restrictive device within 4 days. 4. Patient will ascend/descend 4 stairs with 2 handrail(s) with supervision/set-up within 4 days. 5. Patient will verbalize and demonstrate understanding of spinal precautions (No bending, lifting greater than 5 lbs, or twisting; log-roll technique; frequent repositioning as instructed) within 4 days. physical Therapy EVALUATION Patient: Darien Arana (29 y.o. female) Date: 9/27/2018 Primary Diagnosis: LUMBAR SPONDYLOLITHESIS Spondylolisthesis of lumbar region Procedure(s) (LRB): 
L3-4 LAMINECTOMY FUSION, PLIF (N/A) 1 Day Post-Op Precautions:  Spinal, Fall ASSESSMENT : 
Based on the objective data described below, the patient presents with mild LLE weakness, decreased balance and decreased mobility skills following lumbar surgery yesterday. Postoperatively she had severe nausea and vomiting which prevented PT eval until this afternoon - she reports her pain and nausea are currently well controlled. She was in bed sleeping when PT arrived. She agreed to PT and was cleared by her nurse for therapy. She remained drowsy throughout the assessment session. PTA she reports being limited by back and LLE pain that was constant but varied in intensity. She independent with her functional mobility, but limited in the duration of most activities. She lives with her spouse in a 1 story home with 5 steps to enter.  Reports she will have plenty of help once she returns home. Reviewed and reinforced BLT precautions and use of log rolling to protect spine - she had trouble following the instructions due to drowsiness and will need continued teaching in this area. Currently needs min a x 1 for supine to sit using log roll technique. Sit to standing needed cg assistance and bed to chair transfers were cg assistance. She was unsteady in standing so a RW was required. She ambulated with the RW for 125 feet with cg to min a x 1. Her gait pattern was intermittently unsteady with path deviations and increased trunk sway with 1-2 lob to her L observed. She was left up in a chair with all needs met when the session ended. She will benefit from PT to improve her strength, balance and mobility skills. Recommend HH PT upon discharge at this time. She will need a rolling walker for home use as well. Patient will benefit from skilled intervention to address the above impairments. Patients rehabilitation potential is considered to be Good Factors which may influence rehabilitation potential include:  
[x]         None noted 
[]         Mental ability/status []         Medical condition 
[]         Home/family situation and support systems 
[]         Safety awareness 
[]         Pain tolerance/management 
[]         Other: PLAN : 
Recommendations and Planned Interventions: 
[x]           Bed Mobility Training             [x]    Neuromuscular Re-Education 
[x]           Transfer Training                   []    Orthotic/Prosthetic Training 
[x]           Gait Training                         []    Modalities []           Therapeutic Exercises           []    Edema Management/Control 
[x]           Therapeutic Activities            [x]    Patient and Family Training/Education 
[]           Other (comment): Frequency/Duration: Patient will be followed by physical therapy  twice daily to address goals.  
Discharge Recommendations: Home Health PT 
 Further Equipment Recommendations for Discharge: rolling walker SUBJECTIVE:  
Patient stated My pain and nausea are better.  OBJECTIVE DATA SUMMARY:  
HISTORY:   
Past Medical History:  
Diagnosis Date  Chronic pain  Hypertension  Non-nicotine vapor product user  Psychiatric disorder   
 anxiety Past Surgical History:  
Procedure Laterality Date  HC LAP BAND ADJUST PROCEDURE    
 HX BREAST BIOPSY Left   
 years ago  HX CERVICAL FUSION    
 HX CHOLECYSTECTOMY  HX GASTRIC BYPASS  HX HYSTERECTOMY  HX ORTHOPAEDIC    
 foot surgery--bunions--bilateral  
 HX ORTHOPAEDIC    
 ACDF  REMOVAL GALLBLADDER Prior Level of Function/Home Situation: she reports being limited by back and LLE pain that was constant but varied in intensity. She independent with her functional mobility, but limited in the duration of most activities. She lives with her spouse in a 1 story home with 5 steps to enter. Reports she will have plenty of help once she returns home. Personal factors and/or comorbidities impacting plan of care: None Home Situation Home Environment: Private residence # Steps to Enter: 5 Rails to Enter: Yes Hand Rails : Bilateral 
One/Two Story Residence: One story Living Alone: No 
Support Systems: Spouse/Significant Other/Partner, Child(susy) Tub or Shower Type: Tub/Shower combination EXAMINATION/PRESENTATION/DECISION MAKING:  
Critical Behavior: 
Neurologic State: Alert Orientation Level: Oriented X4 Cognition: Appropriate decision making, Appropriate for age attention/concentration, Follows commands Safety/Judgement: Awareness of environment, Insight into deficits Hearing: 
  
Skin:   
Edema:  
Range Of Motion: 
AROM: Generally decreased, functional 
  
  
  
  
  
  
  
Strength:   
Strength: Generally decreased, functional (4-/5 strength in LLE grossly; 4/5 RLE.) Tone & Sensation:  
Tone: Normal 
  
  
  
  
Sensation: Intact Coordination: 
Coordination: Within functional limits Vision:  
Acuity: Within Defined Limits Corrective Lenses: Glasses Functional Mobility: 
Bed Mobility: 
Rolling: Contact guard assistance Supine to Sit: Minimum assistance (log roll to L) Scooting: Stand-by assistance Transfers: 
Sit to Stand: Minimum assistance Stand to Sit: Contact guard assistance Bed to Chair: Contact guard assistance Balance:  
Sitting: Intact Standing: Impaired Standing - Static: Good;Constant support Standing - Dynamic : Fair Ambulation/Gait Training: 
Distance (ft): 125 Feet (ft) Assistive Device: Walker, rolling;Gait belt Ambulation - Level of Assistance: Contact guard assistance;Minimal assistance;Assist x1 Gait Description (WDL): Exceptions to [facility] Gait Abnormalities: Path deviations;Trunk sway increased Base of Support: Widened;Shift to left Speed/Torrie: Pace decreased (<100 feet/min) Step Length: Left shortened;Right shortened Functional Measure: 
Barthel Index: 
 
Bathin Bladder: 10 Bowels: 10 
Groomin Dressin Feeding: 10 Mobility: 0 Stairs: 0 Toilet Use: 5 Transfer (Bed to Chair and Back): 10 Total: 50 Barthel and G-code impairment scale: 
Percentage of impairment CH 
0% CI 
1-19% CJ 
20-39% CK 
40-59% CL 
60-79% CM 
80-99% CN 
100% Barthel Score 0-100 100 99-80 79-60 59-40 20-39 1-19 
 0 Barthel Score 0-20 20 17-19 13-16 9-12 5-8 1-4 0 The Barthel ADL Index: Guidelines 1. The index should be used as a record of what a patient does, not as a record of what a patient could do. 2. The main aim is to establish degree of independence from any help, physical or verbal, however minor and for whatever reason. 3. The need for supervision renders the patient not independent. 4. A patient's performance should be established using the best available evidence.  Asking the patient, friends/relatives and nurses are the usual sources, but direct observation and common sense are also important. However direct testing is not needed. 5. Usually the patient's performance over the preceding 24-48 hours is important, but occasionally longer periods will be relevant. 6. Middle categories imply that the patient supplies over 50 per cent of the effort. 7. Use of aids to be independent is allowed. Sarah Jaramillo., Barthel, D.W. ([id number]). Functional evaluation: the Barthel Index. [address] [facility] (14)2. RICKY Royal, Ileana Zimmerman., Yoseph Rodrigues., Danay, [facility] (1999). Measuring the change indisability after inpatient rehabilitation; comparison of the responsiveness of the Barthel Index and Functional [place] Measure. Journal of Neurology, Neurosurgery, and Psychiatry, 66(4), 537-261. SHREYA Salazar, ERMELINDA Dunn, & Joaquin Sandhu M.A. (2004.) Assessment of post-stroke quality of life in cost-effectiveness studies: The usefulness of the Barthel Index and the EuroQoL-5D. [facility], 13, 221-83 G codes: In compliance with CMSs Claims Based Outcome Reporting, the following G-code set was chosen for this patient based on their primary functional limitation being treated: The outcome measure chosen to determine the severity of the functional limitation was the Barthel with a score of 50/100 which was correlated with the impairment scale. ? Mobility - Walking and Moving Around:  
  - CURRENT STATUS: CK - 40%-59% impaired, limited or restricted  - GOAL STATUS: CJ - 20%-39% impaired, limited or restricted  - D/C STATUS:  ---------------To be determined--------------- Physical Therapy Evaluation Charge Determination History Examination Presentation Decision-Making MEDIUM  Complexity : 1-2 comorbidities / personal factors will impact the outcome/ POC  MEDIUM Complexity : 3 Standardized tests and measures addressing body structure, function, activity limitation and / or participation in recreation  MEDIUM Complexity : Evolving with changing characteristics  Other outcome measures Barthel  MEDIUM Based on the above components, the patient evaluation is determined to be of the following complexity level: MEDIUM Pain: 
Pain Scale 1: Numeric (0 - 10) Pain Intensity 1: 8 Pain Location 1: Back Pain Description 1: Aching Pain Intervention(s) 1: Medication (see MAR) Activity Tolerance:  
Fair Please refer to the flowsheet for vital signs taken during this treatment. After treatment:  
[x]         Patient left in no apparent distress sitting up in chair 
[]         Patient left in no apparent distress in bed 
[x]         Call bell left within reach [x]         Nursing notified 
[]         Caregiver present 
[]         Bed alarm activated COMMUNICATION/EDUCATION:  
The patients plan of care was discussed with: Occupational Therapist, Registered Nurse,  and NP. [x]         Fall prevention education was provided and the patient/caregiver indicated understanding. [x]         Patient/family have participated as able in goal setting and plan of care. [x]         Patient/family agree to work toward stated goals and plan of care. []         Patient understands intent and goals of therapy, but is neutral about his/her participation. []         Patient is unable to participate in goal setting and plan of care. Thank you for this referral. 
Randall Jules, PT Time Calculation: 21 mins

## 2018-09-28 VITALS
WEIGHT: 159.5 LBS | DIASTOLIC BLOOD PRESSURE: 67 MMHG | SYSTOLIC BLOOD PRESSURE: 151 MMHG | TEMPERATURE: 97.9 F | HEIGHT: 62 IN | OXYGEN SATURATION: 93 % | HEART RATE: 64 BPM | BODY MASS INDEX: 29.35 KG/M2 | RESPIRATION RATE: 12 BRPM

## 2018-09-28 LAB — HGB BLD-MCNC: 9.1 G/DL (ref 11.5–16)

## 2018-09-28 PROCEDURE — 74011250637 HC RX REV CODE- 250/637: Performed by: NURSE PRACTITIONER

## 2018-09-28 PROCEDURE — 85018 HEMOGLOBIN: CPT | Performed by: SPECIALIST

## 2018-09-28 PROCEDURE — 36415 COLL VENOUS BLD VENIPUNCTURE: CPT | Performed by: SPECIALIST

## 2018-09-28 PROCEDURE — 74011250637 HC RX REV CODE- 250/637: Performed by: SPECIALIST

## 2018-09-28 PROCEDURE — 97116 GAIT TRAINING THERAPY: CPT

## 2018-09-28 PROCEDURE — 77030027138 HC INCENT SPIROMETER -A

## 2018-09-28 RX ORDER — AMOXICILLIN 250 MG
1 CAPSULE ORAL DAILY
Qty: 30 TAB | Refills: 0 | Status: SHIPPED | OUTPATIENT
Start: 2018-09-28 | End: 2019-05-10

## 2018-09-28 RX ORDER — NALOXONE HYDROCHLORIDE 4 MG/.1ML
1 SPRAY NASAL AS NEEDED
Qty: 1 EACH | Refills: 0 | Status: SHIPPED | OUTPATIENT
Start: 2018-09-28 | End: 2019-05-10

## 2018-09-28 RX ORDER — OXYCODONE HYDROCHLORIDE 5 MG/1
5-10 TABLET ORAL
Qty: 60 TAB | Refills: 0 | Status: SHIPPED | OUTPATIENT
Start: 2018-09-28 | End: 2019-05-10

## 2018-09-28 RX ORDER — ONDANSETRON 4 MG/1
4 TABLET, ORALLY DISINTEGRATING ORAL
Status: COMPLETED | OUTPATIENT
Start: 2018-09-28 | End: 2018-09-28

## 2018-09-28 RX ORDER — POLYETHYLENE GLYCOL 3350 17 G/17G
17 POWDER, FOR SOLUTION ORAL
Qty: 15 PACKET | Refills: 0 | Status: SHIPPED | OUTPATIENT
Start: 2018-09-28 | End: 2018-10-13

## 2018-09-28 RX ORDER — ONDANSETRON 4 MG/1
4 TABLET, ORALLY DISINTEGRATING ORAL
Qty: 20 TAB | Refills: 0 | Status: SHIPPED | OUTPATIENT
Start: 2018-09-28 | End: 2019-05-10

## 2018-09-28 RX ORDER — ONDANSETRON 4 MG/1
4 TABLET, ORALLY DISINTEGRATING ORAL
Status: DISCONTINUED | OUTPATIENT
Start: 2018-09-28 | End: 2018-09-28

## 2018-09-28 RX ADMIN — LISINOPRIL: 20 TABLET ORAL at 09:45

## 2018-09-28 RX ADMIN — BACLOFEN 10 MG: 10 TABLET ORAL at 09:46

## 2018-09-28 RX ADMIN — PANTOPRAZOLE SODIUM 40 MG: 40 TABLET, DELAYED RELEASE ORAL at 09:45

## 2018-09-28 RX ADMIN — ACETAMINOPHEN 650 MG: 325 TABLET ORAL at 12:54

## 2018-09-28 RX ADMIN — OXYCODONE HYDROCHLORIDE 10 MG: 5 TABLET ORAL at 02:02

## 2018-09-28 RX ADMIN — ONDANSETRON 4 MG: 4 TABLET, ORALLY DISINTEGRATING ORAL at 13:38

## 2018-09-28 RX ADMIN — OXYCODONE HYDROCHLORIDE 10 MG: 5 TABLET ORAL at 09:10

## 2018-09-28 RX ADMIN — ACETAMINOPHEN 650 MG: 325 TABLET ORAL at 06:32

## 2018-09-28 RX ADMIN — Medication 10 ML: at 06:32

## 2018-09-28 RX ADMIN — GABAPENTIN 300 MG: 300 CAPSULE ORAL at 09:46

## 2018-09-28 RX ADMIN — ACETAMINOPHEN 650 MG: 325 TABLET ORAL at 02:02

## 2018-09-28 RX ADMIN — BUPROPION HYDROCHLORIDE 150 MG: 150 TABLET, FILM COATED, EXTENDED RELEASE ORAL at 06:32

## 2018-09-28 RX ADMIN — OXYCODONE HYDROCHLORIDE 5 MG: 5 TABLET ORAL at 13:24

## 2018-09-28 RX ADMIN — ONDANSETRON 4 MG: 4 TABLET, ORALLY DISINTEGRATING ORAL at 09:04

## 2018-09-28 NOTE — PROGRESS NOTES
CM sent referral for [facility] to [facility] via CC. CM updated AVS to reflect provider. Care Management Interventions PCP Verified by CM: Yes Mode of Transport at Discharge: Other (see comment) Transition of Care Consult (CM Consult): Discharge Planning, Home Health [address]: Yes Discharge Durable Medical Equipment: Yes (rw) Health Maintenance Reviewed: Yes Physical Therapy Consult: Yes Occupational Therapy Consult: Yes Speech Therapy Consult: No 
Current Support Network: Own Home Confirm Follow Up Transport: Family Plan discussed with Pt/Family/Caregiver: Yes Discharge Location Discharge Placement: Home with family assistance LEN Thomson,  [facility],[address] [facility] Care Manager [phone]

## 2018-09-28 NOTE — PROGRESS NOTES
Problem: Mobility Impaired (Adult and Pediatric) Goal: *Acute Goals and Plan of Care (Insert Text) Physical Therapy Goals Initiated 9/27/2018 1. Patient will move from supine to sit and sit to supine , scoot up and down and roll side to side in bed with modified independence using log roll technique within 4 days. 2. Patient will perform sit to stand with independence within 4 days. 3. Patient will ambulate with modified independence for 450 feet with the least restrictive device within 4 days. 4. Patient will ascend/descend 4 stairs with 2 handrail(s) with supervision/set-up within 4 days. 5. Patient will verbalize and demonstrate understanding of spinal precautions (No bending, lifting greater than 5 lbs, or twisting; log-roll technique; frequent repositioning as instructed) within 4 days. physical Therapy TREATMENT Patient: Ace Bills (14 y.o. female) Date: 9/28/2018 Diagnosis: LUMBAR SPONDYLOLITHESIS Spondylolisthesis of lumbar region S/P lumbar spinal fusion Procedure(s) (LRB): 
L3-4 LAMINECTOMY FUSION, PLIF (N/A) 2 Days Post-Op Precautions: Spinal, Fall Chart, physical therapy assessment, plan of care and goals were reviewed. ASSESSMENT: 
Pt cleared by nurse to mobilize. Pt received sitting EOB with daughter in room. Pt agreeable to therapy. Pt performed sit to stand transfer at [place] [address]. Pt ambulated 600ft with RW at [facility]/A. Pt requiring instructions to walk closer to walker and to stand upright. Pt silke to correct with cueing. Pt ascended and descended 4 steps arturo left railing at [facility] step over step. Pt requiring to step to step when descending. Pt performed a car transfer at [place] [address] with cueing for sequencing. Pt moving well with no safety concerns. From physical therapy stand point pt cleared for discharge. Pt will benefit from HHPT to improve LE strength and safe mobility. Progression toward goals: 
[x]      Improving appropriately and progressing toward goals []      Improving slowly and progressing toward goals 
[]      Not making progress toward goals and plan of care will be adjusted PLAN: 
Patient continues to benefit from skilled intervention to address the above impairments. Continue treatment per established plan of care. Discharge Recommendations:  Home Health Further Equipment Recommendations for Discharge:  rolling walker SUBJECTIVE:  
Patient stated My right side is weaker than my left.  The patient stated 3/3 back precautions. Reviewed all 3 with patient. OBJECTIVE DATA SUMMARY:  
Critical Behavior: 
Neurologic State: Alert, Appropriate for age Orientation Level: Oriented X4 Cognition: Appropriate decision making, Appropriate for age attention/concentration, Appropriate safety awareness, Follows commands Safety/Judgement: Awareness of environment, Insight into deficits Functional Mobility Training: 
  
 
Transfers: 
Sit to Stand: Stand-by assistance Stand to Sit: Stand-by assistance Balance: 
Sitting: Intact Standing: Intact; With support Standing - Static: Good;Constant support Standing - Dynamic : Good Ambulation/Gait Training: 
Distance (ft): 600 Feet (ft) Assistive Device: Gait belt;Walker, rolling Ambulation - Level of Assistance: Contact guard assistance;Stand-by assistance Gait Abnormalities: Path deviations (slight trunk flexion) Base of Support: Widened Speed/Torrie: Pace decreased (<100 feet/min) Step Length: Right shortened;Left shortened Stairs: 
Number of Stairs Trained: 4 Stairs - Level of Assistance: Contact guard assistance Rail Use: Left Pain: 
Pain Scale 1: Numeric (0 - 10) Pain Intensity 1: 3 Pain Location 1: Neck;Back Pain Orientation 1: Upper Pain Description 1: Aching Pain Intervention(s) 1: Declines Activity Tolerance:  
Pt moving well with no safety concerns. After treatment:  
[x]  Patient left in no apparent distress sitting up in chair []  Patient left in no apparent distress in bed 
[x]  Call bell left within reach [x]  Nursing notified 
[x]  Caregiver present 
[]  Bed alarm activated COMMUNICATION/COLLABORATION:  
The patients plan of care was discussed with: Registered Nurse Mara Rizvi Time Calculation: 16 mins

## 2018-09-28 NOTE — PROGRESS NOTES
Occupational Therapy Completed chart review. Nursing cleared for therapy however reporting patient with nausea. Received meds for nausea. Patient requesting to rest secondary to nausea. Will continue to follow for OT services. Thank you, Tracy Lemon OTR/L

## 2018-09-28 NOTE — DISCHARGE SUMMARY
Spine Discharge Summary    Patient ID:  Ji Garcia  [id number]  female  60 y.o.  1960    Admit date: 2018    Discharge date: 2018    Admitting Physician: Nohemy Trinidad MD     Consulting Physician(s):   Treatment Team: Attending Provider: Nohemy Trinidad MD; Nurse Practitioner: Deniz Moreno NP; Nurse Practitioner: Vivien Mccauley NP; Utilization Review: Jostin Pagan; Utilization Review: Percy Workman; Care Manager: Tone Parks    Date of Surgery:   2018     Preoperative Diagnosis:  LUMBAR SPONDYLOLITHESIS     Postoperative Diagnosis:   LUMBAR SPONDYLOLITHESIS    Procedure(s):  L3-4 LAMINECTOMY FUSION, PLIF     Anesthesia Type:   General     Surgeon: Nohemy Trinidad MD                            HPI:  Pt is a 60 y.o. female who has a history of LUMBAR SPONDYLOLITHESIS   with pain and limitations of activities of daily living who presents at this time for a L3-4 PSF following the failure of conservative management. PMH:   Past Medical History:   Diagnosis Date    Chronic pain     Hypertension     Non-nicotine vapor product user     Psychiatric disorder     anxiety       Body mass index is 29.17 kg/(m^2). : A BMI > 30 is classified as obesity and > 40 is classified as morbid obesity. Medications upon admission :   Prior to Admission Medications   Prescriptions Last Dose Informant Patient Reported? Taking? ALPRAZolam (XANAX) 0.5 mg tablet 2018 at 0445  No Yes   Sig: take 1 tablet by mouth twice a day if needed. Must last 30 days. aspirin/salicylamide/caffeine (BC HEADACHE POWDER PO) 2018 at 0800  Yes Yes   Sig: Take 1 Package by mouth. azelastine (ASTELIN) 137 mcg (0.1 %) nasal spray Not Taking at Unknown time  Yes No   Si Spray as needed for Rhinitis. Use in each nostril as directed   baclofen (LIORESAL) 10 mg tablet 2018  No No   Sig: Take 1 Tab by mouth three (3) times daily as needed.    buPROPion XL (WELLBUTRIN XL) 150 mg tablet 2018 at Unknown time  No Yes   Sig: Take 1 Tab by mouth every morning. cromolyn (OPTICROM) 4 % ophthalmic solution 9/25/2018 at 2100  No Yes   Sig: Administer 1 Drop to both eyes four (4) times daily. Use in affected eye(s)   diclofenac EC (VOLTAREN) 75 mg EC tablet 9/21/2018  No No   Sig: Take 1 Tab by mouth two (2) times daily as needed. gabapentin (NEURONTIN) 300 mg capsule 9/12/2018  No No   Sig: Take 1 Cap by mouth two (2) times a day. ibuprofen (MOTRIN) 800 mg tablet Not Taking at Unknown time  Yes No   Sig: Take  by mouth as needed. lisinopril-hydroCHLOROthiazide (PRINZIDE, ZESTORETIC) 20-12.5 mg per tablet 9/25/2018 at 0800  No Yes   Sig: take 1 tablet by mouth once daily   pantoprazole (PROTONIX) 40 mg tablet 9/25/2018 at 0800  No Yes   Sig: Take 1 Tab by mouth daily. Facility-Administered Medications: None        Allergies:  No Known Allergies     Hospital Course: The patient underwent surgery. Complications:  None; patient tolerated the procedure well. Was taken to the PACU in stable condition and then transferred to the ortho floor. Perioperative Antibiotics:  Ancef     Postoperative Pain Management:  Oxycodone      Postoperative transfusions:    Number of units banked PRBCs =   none     Post Op complications: none    Hemoglobin at discharge:    Lab Results   Component Value Date/Time    HGB 9.1 (L) 09/28/2018 05:32 AM    INR 1.1 09/21/2018 11:50 AM       Dressing remained clean, dry and intact. No significant erythema or swelling. Neurovascular exam found to be within normal limits. Wound appears to be healing without any evidence of infection. Pt had a HVAC drain that was removed on POD# 2. Nausea and vomiting on POD#1 improved by POD#2. Pt educated to watch for BM and use laxative and stool softener while using opioids    Physical Therapy started on the day following surgery and participated in bed mobility, transfers and ambulation.         Gait:  Gait  Base of Support: Widened, Shift to left  Speed/Torrie: Pace decreased (<100 feet/min)  Step Length: Left shortened, Right shortened  Gait Abnormalities: Path deviations, Trunk sway increased  Ambulation - Level of Assistance: Contact guard assistance, Minimal assistance, Assist x1  Distance (ft): 125 Feet (ft)  Assistive Device: Walker, rolling, Gait belt                   Discharged to: Home. Condition on Discharge:   stable    Discharge instructions:    - Take pain medications as prescribed  - Resume pre hospital diet      - Discharge activity: activity as tolerated  - Ambulate as tolerated  - Wound Care Keep wound clean and dry. See discharge instruction sheet. -DISCHARGE MEDICATION LIST     Current Discharge Medication List      START taking these medications    Details   oxyCODONE IR (ROXICODONE) 5 mg immediate release tablet Take 1-2 Tabs by mouth every four (4) hours as needed. Max Daily Amount: 60 mg. If insurance prior auth./quantity restrictions apply, refer to and look up diagnosis code one prescription. Partial fill as needed is permitted. Please do not contact prescriber. Qty: 60 Tab, Refills: 0    Associated Diagnoses: S/P lumbar spinal fusion      polyethylene glycol (MIRALAX) 17 gram packet Take 1 Packet by mouth daily as needed (constipation) for up to 15 days. Qty: 15 Packet, Refills: 0      senna-docusate (PERICOLACE) 8.6-50 mg per tablet Take 1 Tab by mouth daily. Qty: 30 Tab, Refills: 0      naloxone (NARCAN) 4 mg/actuation nasal spray 1 [place] by IntraNASal route as needed (respiratory depression). Give single spray into one nostril. Call 911. Give additional doses every 2 to 3 minutes alternating nostrils until assistance arrives using a new nasal spray with each dose, if patient does not respond or responds and then relapses. Qty: 1 Each, Refills: 0         CONTINUE these medications which have NOT CHANGED    Details   aspirin/salicylamide/caffeine (BC HEADACHE POWDER PO) Take 1 Package by mouth. pantoprazole (PROTONIX) 40 mg tablet Take 1 Tab by mouth daily. Qty: 30 Tab, Refills: 5    Associated Diagnoses: GERD without esophagitis      ALPRAZolam (XANAX) 0.5 mg tablet take 1 tablet by mouth twice a day if needed. Must last 30 days. Qty: 60 Tab, Refills: 2    Associated Diagnoses: Anxiety      buPROPion XL (WELLBUTRIN XL) 150 mg tablet Take 1 Tab by mouth every morning. Qty: 30 Tab, Refills: 6    Associated Diagnoses: Anxiety      lisinopril-hydroCHLOROthiazide (PRINZIDE, ZESTORETIC) 20-12.5 mg per tablet take 1 tablet by mouth once daily  Qty: 30 Tab, Refills: 5    Associated Diagnoses: Essential hypertension      cromolyn (OPTICROM) 4 % ophthalmic solution Administer 1 Drop to both eyes four (4) times daily. Use in affected eye(s)  Qty: 10 mL, Refills: 0    Associated Diagnoses: Allergic conjunctivitis of both eyes      azelastine (ASTELIN) 137 mcg (0.1 %) nasal spray 1 Spray as needed for Rhinitis. Use in each nostril as directed      baclofen (LIORESAL) 10 mg tablet Take 1 Tab by mouth three (3) times daily as needed. Qty: 30 Tab, Refills: 2    Associated Diagnoses: Neck pain on right side      gabapentin (NEURONTIN) 300 mg capsule Take 1 Cap by mouth two (2) times a day. Qty: 60 Cap, Refills: 1         STOP taking these medications       diclofenac EC (VOLTAREN) 75 mg EC tablet Comments:   Reason for Stopping:         ibuprofen (MOTRIN) 800 mg tablet Comments:   Reason for Stopping:            per medical continuation form      -Follow up in office in 2 weeks      Signed:  Dada Salmeron.  Jenise Snell, ACNP-BC, ONP-C  Orthopaedic Nurse Practitioner    9/28/2018  12:02 PM

## 2018-09-28 NOTE — PROGRESS NOTES
Ortho / Neurosurgery NP Note POD# 2  s/p L3-4 LAMINECTOMY FUSION, PLIF Pt seen with nursing Pt resting in bed. No complaints. Nausea improved, no more vomiting. Ate soup for dinner and some pancakes for breakfast. 
Poor appetite VSS Afebrile. Voiding status: + void through the night per patient report. Labs Lab Results Component Value Date/Time HGB 9.1 (L) 09/28/2018 05:32 AM  
  
Lab Results Component Value Date/Time INR 1.1 09/21/2018 11:50 AM  
  
 
Body mass index is 29.17 kg/(m^2). : A BMI > 30 is classified as obesity and > 40 is classified as morbid obesity. Dressing c.d.i Cryotherapy in place over incision Drain 100 cc from 4p-6a - will discontinue today. Calves soft and supple; No pain with passive stretch Sensation and motor intact SCDs for mechanical DVT proph while in bed PLAN: 
1) PT BID 
2) Nausea - zofran ODT ordered. Ensure to poor appetite 3) Readniess for discharge: 
   [x] Vital Signs stable  
 [x] Hgb stable  
 [x] + Voiding  
 [x] Wound intact, drainage minimal  
 [] Tolerating PO intake   
 [] Cleared by PT (OT if applicable) [] Stair training completed (if applicable) [] Independent / Contact Guard Assist (household distance) [] Bed mobility [] Car transfers  
  [] ADLs [x] Adequate pain control on oral medication alone Home today if tolerating PO better and clears PT.  
 
Domenico Gayle, ARPITA 
DNP, ACNP-BC, ONP-C

## 2018-09-28 NOTE — PROGRESS NOTES
Problem: Falls - Risk of 
Goal: *Absence of Falls Document Rosales Carter Fall Risk and appropriate interventions in the flowsheet. Outcome: Resolved/Met Date Met: 09/28/18 Fall Risk Interventions: 
Mobility Interventions: Communicate number of staff needed for ambulation/transfer, OT consult for ADLs, Patient to call before getting OOB, PT Consult for mobility concerns, PT Consult for assist device competence, Strengthening exercises (ROM-active/passive), Utilize walker, cane, or other assistive device Medication Interventions: Patient to call before getting OOB, Teach patient to arise slowly Elimination Interventions: Call light in reach, Patient to call for help with toileting needs, Toilet paper/wipes in reach

## 2018-09-28 NOTE — PROGRESS NOTES
CM sent referral for RW to [facility] via [facility]. Care Management Interventions PCP Verified by CM: Yes Mode of Transport at Discharge: Other (see comment) Transition of Care Consult (CM Consult): Discharge Planning Discharge Durable Medical Equipment: Yes (rw) Health Maintenance Reviewed: Yes Physical Therapy Consult: Yes Occupational Therapy Consult: Yes Speech Therapy Consult: No 
Current Support Network: Own Home Confirm Follow Up Transport: Family Plan discussed with Pt/Family/Caregiver: Yes Discharge Location Discharge Placement: Home with family assistance LEN Willard, CONCHITA [address] [place] [address],[address] Care Manager [phone]

## 2018-09-28 NOTE — PROGRESS NOTES
Bedside and Verbal shift change report given to Elijah RENEE RN/Qi VITALE (oncoming nurse) by Andres Lai (offgoing nurse). Report included the following information SBAR, Kardex, Procedure Summary, Intake/Output, MAR and Accordion.

## 2018-09-28 NOTE — PROGRESS NOTES
[facility] is unable to accept pt. CM sent referral to [facility]. Jackey Jeans, MSW,  [facility],4[address] Floor Care Manager [phone]

## 2018-09-29 NOTE — PROGRESS NOTES
Discharge instructions reviewed with the patient and the daughter at the bedside. Both able to verbalize an understanding. All personal belongings with the patient. The patient left via wheelchair.

## 2018-10-14 DIAGNOSIS — K21.9 GERD WITHOUT ESOPHAGITIS: ICD-10-CM

## 2018-10-15 RX ORDER — PANTOPRAZOLE SODIUM 40 MG/1
TABLET, DELAYED RELEASE ORAL
Qty: 30 TAB | Refills: 5 | Status: SHIPPED | OUTPATIENT
Start: 2018-10-15 | End: 2019-03-19 | Stop reason: SDUPTHER

## 2018-11-08 ENCOUNTER — OFFICE VISIT (OUTPATIENT)
Dept: FAMILY MEDICINE CLINIC | Age: 58
End: 2018-11-08

## 2018-11-08 VITALS
HEART RATE: 66 BPM | WEIGHT: 154 LBS | BODY MASS INDEX: 28.34 KG/M2 | HEIGHT: 62 IN | RESPIRATION RATE: 16 BRPM | OXYGEN SATURATION: 99 % | SYSTOLIC BLOOD PRESSURE: 168 MMHG | TEMPERATURE: 98.1 F | DIASTOLIC BLOOD PRESSURE: 85 MMHG

## 2018-11-08 DIAGNOSIS — F41.9 ANXIETY: ICD-10-CM

## 2018-11-08 RX ORDER — ALPRAZOLAM 0.5 MG/1
TABLET ORAL
Qty: 60 TAB | Refills: 2 | Status: SHIPPED | OUTPATIENT
Start: 2018-11-08 | End: 2019-03-19 | Stop reason: SDUPTHER

## 2018-11-08 NOTE — PROGRESS NOTES
1. Have you been to the ER, urgent care clinic since your last visit? Hospitalized since your last visit? Yes, [facility], 9/26/18    2. Have you seen or consulted any other health care providers outside of the [address] [place] [address] since your last visit? Include any pap smears or colon screening.  No     Chief Complaint   Patient presents with    Medication Refill     Xanax

## 2018-11-08 NOTE — PROGRESS NOTES
Chief Complaint   Patient presents with    Medication Refill     Xanax     she is a 60y.o. year old female who presents for evalution. Pt here for Xanax refills. Just had lumbar fushion done, in a lot of pain. Was unable to take pain medication since had to drive herself here. Has narcan prescription already, is aware not to mix Xanax and Norco.      Reviewed PmHx, RxHx, FmHx, SocHx, AllgHx and updated and dated in the chart. Review of Systems - negative except as listed above in the HPI    Objective:     Vitals:    11/08/18 1339   BP: 168/85   Pulse: 66   Resp: 16   Temp: 98.1 °F (36.7 °C)   TempSrc: Oral   SpO2: 99%   Weight: 154 lb (69.9 kg)   Height: 5' 2\" (1.575 m)     Physical Examination: General appearance - alert, well appearing, and in mild to moderate distress  Mental status - alert, oriented to person, place, and time, normal mood, behavior, speech, dress, motor activity, and thought processes  Chest - clear to auscultation, no wheezes, rales or rhonchi, symmetric air entry  Heart - normal rate, regular rhythm, normal S1, S2, no murmurs, rubs, clicks or gallops    Assessment/ Plan:   Diagnoses and all orders for this visit:    1. Anxiety  -     ALPRAZolam (XANAX) 0.5 mg tablet; take 1 tablet by mouth twice a day if needed. Must last 30 days. Reviewed , no suspicious fills. F/U 3 mo. Pt voiced understanding regarding plan of care. Follow-up Disposition:  Return if symptoms worsen or fail to improve. I have discussed the diagnosis with the patient and the intended plan as seen in the above orders. The patient has received an after-visit summary and questions were answered concerning future plans.      Medication Side Effects and Warnings were discussed with patient    Richard Fernandez NP

## 2019-03-19 DIAGNOSIS — K21.9 GERD WITHOUT ESOPHAGITIS: ICD-10-CM

## 2019-03-19 DIAGNOSIS — F41.9 ANXIETY: ICD-10-CM

## 2019-03-20 ENCOUNTER — TELEPHONE (OUTPATIENT)
Dept: FAMILY MEDICINE CLINIC | Age: 59
End: 2019-03-20

## 2019-03-20 DIAGNOSIS — K21.9 GERD WITHOUT ESOPHAGITIS: ICD-10-CM

## 2019-03-20 RX ORDER — PANTOPRAZOLE SODIUM 40 MG/1
TABLET, DELAYED RELEASE ORAL
Qty: 30 TAB | Refills: 5 | Status: SHIPPED | OUTPATIENT
Start: 2019-03-20 | End: 2019-03-20 | Stop reason: SDUPTHER

## 2019-03-20 RX ORDER — ALPRAZOLAM 0.5 MG/1
TABLET ORAL
Qty: 60 TAB | Refills: 2 | OUTPATIENT
Start: 2019-03-20 | End: 2019-04-01 | Stop reason: SDUPTHER

## 2019-03-20 RX ORDER — PANTOPRAZOLE SODIUM 40 MG/1
TABLET, DELAYED RELEASE ORAL
Qty: 90 TAB | Refills: 1 | Status: SHIPPED | OUTPATIENT
Start: 2019-03-20 | End: 2019-03-26 | Stop reason: SDUPTHER

## 2019-03-20 NOTE — TELEPHONE ENCOUNTER
Called and spoke to patient. Ajay Cota ) Patient states understanding of [facility]. Patient will call back to schedule office visit for Xanax refill.

## 2019-03-20 NOTE — TELEPHONE ENCOUNTER
Spoke to patient. A 90 day supply of pantoprazole needs to be sent to [facility] ( not 30) and please print up alprazolam rx to fax to [facility].

## 2019-03-26 DIAGNOSIS — K21.9 GERD WITHOUT ESOPHAGITIS: ICD-10-CM

## 2019-03-28 RX ORDER — PANTOPRAZOLE SODIUM 40 MG/1
TABLET, DELAYED RELEASE ORAL
Qty: 90 TAB | Refills: 2 | Status: SHIPPED | OUTPATIENT
Start: 2019-03-28 | End: 2019-09-24 | Stop reason: SDUPTHER

## 2019-04-01 DIAGNOSIS — F41.9 ANXIETY: ICD-10-CM

## 2019-04-01 RX ORDER — ALPRAZOLAM 0.5 MG/1
TABLET ORAL
Qty: 180 TAB | Refills: 0 | OUTPATIENT
Start: 2019-04-01 | End: 2019-04-01 | Stop reason: SDUPTHER

## 2019-04-01 RX ORDER — ALPRAZOLAM 0.5 MG/1
TABLET ORAL
Qty: 180 TAB | Refills: 0 | Status: SHIPPED | OUTPATIENT
Start: 2019-04-01 | End: 2019-10-08 | Stop reason: SDUPTHER

## 2019-05-10 ENCOUNTER — OFFICE VISIT (OUTPATIENT)
Dept: FAMILY MEDICINE CLINIC | Age: 59
End: 2019-05-10

## 2019-05-10 VITALS
HEIGHT: 62 IN | BODY MASS INDEX: 28.14 KG/M2 | DIASTOLIC BLOOD PRESSURE: 79 MMHG | RESPIRATION RATE: 21 BRPM | OXYGEN SATURATION: 100 % | SYSTOLIC BLOOD PRESSURE: 158 MMHG | TEMPERATURE: 98.4 F | HEART RATE: 56 BPM | WEIGHT: 152.9 LBS

## 2019-05-10 DIAGNOSIS — F41.9 ANXIETY: Primary | ICD-10-CM

## 2019-05-10 DIAGNOSIS — I10 ESSENTIAL HYPERTENSION: ICD-10-CM

## 2019-05-10 NOTE — PROGRESS NOTES
Chief Complaint   Patient presents with    Medication Refill     xanax     she is a 60y.o. year old female who presents for evalution. Pt states first day after returned to work ended up falling on chair, hit back on chair. Saw Ortho this morning, has appt scheduled for injection in a few weeks. Pt has been using heat and TENS unit at home. Has Baclofen but hasn't been taking. Here today for refills on Xanax. Believes BP is elevated today since has been without anxiety medication past few days. Admits does not take her BP medication regularly. Reviewed PmHx, RxHx, FmHx, SocHx, AllgHx and updated and dated in the chart. Review of Systems - negative except as listed above in the HPI    Objective:     Vitals:    05/10/19 1442   BP: 158/79   Pulse: (!) 56   Resp: 21   Temp: 98.4 °F (36.9 °C)   SpO2: 100%   Weight: 152 lb 14.4 oz (69.4 kg)   Height: 5' 2\" (1.575 m)     Physical Examination: General appearance - alert, well appearing, and in no distress  Mental status - alert, oriented to person, place, and time, anxious  Chest - clear to auscultation, no wheezes, rales or rhonchi, symmetric air entry  Heart - normal rate, regular rhythm, normal S1, S2, no murmurs, rubs, clicks or gallops    Assessment/ Plan:   Diagnoses and all orders for this visit:    1. Anxiety  Review of  shows [facility] filled 3 mo supply for patient on 4/9/19. Advised pt to call [facility] and check cabinet at home since should not need refills. 2. Essential hypertension  Recommended pt continue to monitor at home, may need to restart rx. Encouraged pt to monitor BP at home, preferably in AM when first wakes up. Goal BP is < 130/90 if on meds. Discussed consequences of uncontrolled HTN and need for yearly eye exam. Recommended pt limit salt intake and engage in regular exercise. Continue current medications. F/U 3 mo or sooner if needed    Pt voiced understanding regarding plan of care.      I have discussed the diagnosis with the patient and the intended plan as seen in the above orders. The patient has received an after-visit summary and questions were answered concerning future plans.      Medication Side Effects and Warnings were discussed with patient      Sabrina Case NP

## 2019-09-24 DIAGNOSIS — K21.9 GERD WITHOUT ESOPHAGITIS: ICD-10-CM

## 2019-09-24 RX ORDER — PANTOPRAZOLE SODIUM 40 MG/1
TABLET, DELAYED RELEASE ORAL
Qty: 90 TAB | Refills: 4 | Status: SHIPPED | OUTPATIENT
Start: 2019-09-24 | End: 2019-10-17 | Stop reason: SDUPTHER

## 2019-10-08 ENCOUNTER — TELEPHONE (OUTPATIENT)
Dept: FAMILY MEDICINE CLINIC | Age: 59
End: 2019-10-08

## 2019-10-08 DIAGNOSIS — F41.9 ANXIETY: ICD-10-CM

## 2019-10-08 RX ORDER — ALPRAZOLAM 0.5 MG/1
TABLET ORAL
Qty: 14 TAB | Refills: 0 | OUTPATIENT
Start: 2019-10-08 | End: 2019-10-17 | Stop reason: SDUPTHER

## 2019-10-08 NOTE — TELEPHONE ENCOUNTER
Patient called in and made an appt to see you on Oct 15th for a xanax refill. She hasn't been seen since May with [facility]. She was wondering if [facility] her [facility]. I told her she has to be seen for that medicine but I would send a message to you to see if you could. Call back number for her is [phone]. Thanks.

## 2019-10-17 ENCOUNTER — OFFICE VISIT (OUTPATIENT)
Dept: FAMILY MEDICINE CLINIC | Age: 59
End: 2019-10-17

## 2019-10-17 VITALS
DIASTOLIC BLOOD PRESSURE: 81 MMHG | TEMPERATURE: 97.6 F | HEIGHT: 62 IN | OXYGEN SATURATION: 91 % | SYSTOLIC BLOOD PRESSURE: 159 MMHG | HEART RATE: 72 BPM | WEIGHT: 151 LBS | BODY MASS INDEX: 27.79 KG/M2 | RESPIRATION RATE: 16 BRPM

## 2019-10-17 DIAGNOSIS — M79.2 NERVE PAIN: ICD-10-CM

## 2019-10-17 DIAGNOSIS — F41.9 ANXIETY: Primary | ICD-10-CM

## 2019-10-17 DIAGNOSIS — K21.9 GERD WITHOUT ESOPHAGITIS: ICD-10-CM

## 2019-10-17 RX ORDER — ALPRAZOLAM 0.5 MG/1
TABLET ORAL
Qty: 180 TAB | Refills: 0 | Status: SHIPPED | OUTPATIENT
Start: 2019-10-17 | End: 2020-01-21

## 2019-10-17 RX ORDER — PANTOPRAZOLE SODIUM 40 MG/1
TABLET, DELAYED RELEASE ORAL
Qty: 90 TAB | Refills: 4 | Status: SHIPPED | OUTPATIENT
Start: 2019-10-17 | End: 2021-01-14 | Stop reason: SDUPTHER

## 2019-10-17 RX ORDER — GABAPENTIN 100 MG/1
100 CAPSULE ORAL 2 TIMES DAILY
Qty: 60 CAP | Refills: 2 | Status: SHIPPED | OUTPATIENT
Start: 2019-10-17 | End: 2021-03-02 | Stop reason: ALTCHOICE

## 2019-10-17 NOTE — PROGRESS NOTES
Chief Complaint   Patient presents with    Medication Refill     Patient presents in office today for med refill of Xanax and pantoprazole. No other concerns. 1. Have you been to the ER, urgent care clinic since your last visit? Hospitalized since your last visit? No    2. Have you seen or consulted any other health care providers outside of the [address] [place] [address] since your last visit? Include any pap smears or colon screening.  No    Learning Assessment 4/4/2016   PRIMARY LEARNER Patient   HIGHEST LEVEL OF EDUCATION - PRIMARY LEARNER  SOME COLLEGE   BARRIERS PRIMARY LEARNER NONE   CO-LEARNER CAREGIVER No   PRIMARY LANGUAGE ENGLISH   LEARNER PREFERENCE PRIMARY DEMONSTRATION   ANSWERED BY self   RELATIONSHIP SELF

## 2019-10-17 NOTE — PROGRESS NOTES
Sanju Paulino is a 60 y.o. female   Chief Complaint   Patient presents with    Medication Refill    pt here for follow up and for refill of her xanax for anxiety which is used 1-2 x a day with no side effects and no hx of drug or etoh abuse.  checked and appropriate. Pt also reports she had an injection and during the insertion of the needle she started having severe pain. Pt went to her surgeon and was given prednisone and diclofenac which did not help much. Discussed trying gabapentin and pt is agreeable to this. She would also like a refill on protonix and this is working well for her GERD. No issues  she is a 60y.o. year old female who presents for evalution. Reviewed PmHx, RxHx, FmHx, SocHx, AllgHx and updated and dated in the chart. Review of Systems - negative except as listed above in the HPI    Objective:     Vitals:    10/17/19 1521   BP: 159/81   Pulse: 72   Resp: 16   Temp: 97.6 °F (36.4 °C)   TempSrc: Oral   SpO2: 91%   Weight: 151 lb (68.5 kg)   Height: 5' 2\" (1.575 m)       Current Outpatient Medications   Medication Sig    pantoprazole (PROTONIX) 40 mg tablet TAKE 1 TABLET DAILY by mouth    ALPRAZolam (XANAX) 0.5 mg tablet take 1 tablet by mouth twice a day if needed    gabapentin (NEURONTIN) 100 mg capsule Take 1 Cap by mouth two (2) times a day. Max Daily Amount: 200 mg.  aspirin/salicylamide/caffeine (BC HEADACHE POWDER PO) Take 1 Package by mouth.  azelastine (ASTELIN) 137 mcg (0.1 %) nasal spray 1 Spray as needed for Rhinitis. Use in each nostril as directed    baclofen (LIORESAL) 10 mg tablet Take 1 Tab by mouth three (3) times daily as needed.  cromolyn (OPTICROM) 4 % ophthalmic solution Administer 1 Drop to both eyes four (4) times daily. Use in affected eye(s)    buPROPion XL (WELLBUTRIN XL) 150 mg tablet Take 1 Tab by mouth every morning.     lisinopril-hydroCHLOROthiazide (PRINZIDE, ZESTORETIC) 20-12.5 mg per tablet take 1 tablet by mouth once daily     No current facility-administered medications for this visit. Physical Examination: General appearance - alert, well appearing, and in no distress  Chest - clear to auscultation, no wheezes, rales or rhonchi, symmetric air entry  Heart - normal rate, regular rhythm, normal S1, S2, no murmurs, rubs, clicks or gallops  Musculoskeletal - pos SLR on R      Assessment/ Plan:   Diagnoses and all orders for this visit:    1. Anxiety  -     ALPRAZolam (XANAX) 0.5 mg tablet; take 1 tablet by mouth twice a day if needed    2. GERD without esophagitis  -     pantoprazole (PROTONIX) 40 mg tablet; TAKE 1 TABLET DAILY by mouth    3. Nerve pain  -     gabapentin (NEURONTIN) 100 mg capsule; Take 1 Cap by mouth two (2) times a day. Max Daily Amount: 200 mg. Discussed trial of low dose and dose can be increased if needed     Follow-up and Dispositions    · Return in about 3 months (around 1/17/2020), or if symptoms worsen or fail to improve. I have discussed the diagnosis with the patient and the intended plan as seen in the above orders. The patient has received an after-visit summary and questions were answered concerning future plans. Pt conveyed understanding of plan. Medication Side Effects and Warnings were discussed with patient      Barbara Nevarez DO     Discussed the patient's BMI with her. The BMI follow up plan is as follows:     dietary management education, guidance, and counseling  encourage exercise  monitor weight  prescribed dietary intake    An After Visit Summary was printed and given to the patient.

## 2019-10-17 NOTE — PATIENT INSTRUCTIONS
Body Mass Index: Care Instructions Your Care Instructions Body mass index (BMI) can help you see if your weight is raising your risk for health problems. It uses a formula to compare how much you weigh with how tall you are. · A BMI lower than 18.5 is considered underweight. · A BMI between 18.5 and 24.9 is considered healthy. · A BMI between 25 and 29.9 is considered overweight. A BMI of 30 or higher is considered obese. If your BMI is in the normal range, it means that you have a lower risk for weight-related health problems. If your BMI is in the overweight or obese range, you may be at increased risk for weight-related health problems, such as high blood pressure, heart disease, stroke, arthritis or joint pain, and diabetes. If your BMI is in the underweight range, you may be at increased risk for health problems such as fatigue, lower protection (immunity) against illness, muscle loss, bone loss, hair loss, and hormone problems. BMI is just one measure of your risk for weight-related health problems. You may be at higher risk for health problems if you are not active, you eat an unhealthy diet, or you drink too much alcohol or use tobacco products. Follow-up care is a key part of your treatment and safety. Be sure to make and go to all appointments, and call your doctor if you are having problems. It's also a good idea to know your test results and keep a list of the medicines you take. How can you care for yourself at home? · Practice healthy eating habits. This includes eating plenty of fruits, vegetables, whole grains, lean protein, and low-fat dairy. · If your doctor recommends it, get more exercise. Walking is a good choice. Bit by bit, increase the amount you walk every day. Try for at least 30 minutes on most days of the week. · Do not smoke. Smoking can increase your risk for health problems.  If you need help quitting, talk to your doctor about stop-smoking programs and medicines. These can increase your chances of quitting for good. · Limit alcohol to 2 drinks a day for men and 1 drink a day for women. Too much alcohol can cause health problems. If you have a BMI higher than 25 · Your doctor may do other tests to check your risk for weight-related health problems. This may include measuring the distance around your waist. A waist measurement of more than 40 inches in men or 35 inches in women can increase the risk of weight-related health problems. · Talk with your doctor about steps you can take to stay healthy or improve your health. You may need to make lifestyle changes to lose weight and stay healthy, such as changing your diet and getting regular exercise. If you have a BMI lower than 18.5 · Your doctor may do other tests to check your risk for health problems. · Talk with your doctor about steps you can take to stay healthy or improve your health. You may need to make lifestyle changes to gain or maintain weight and stay healthy, such as getting more healthy foods in your diet and doing exercises to build muscle. Where can you learn more? Go to http://maurice-cristopher.info/. Enter S176 in the search box to learn more about \"Body Mass Index: Care Instructions. \" Current as of: October 13, 2016 Content Version: 11.4 © 4376-5394 Healthwise, Incorporated. Care instructions adapted under license by [employer] (which disclaims liability or warranty for this information). If you have questions about a medical condition or this instruction, always ask your healthcare professional. [address] any warranty or liability for your use of this information.

## 2020-01-19 DIAGNOSIS — F41.9 ANXIETY: ICD-10-CM

## 2020-01-21 RX ORDER — ALPRAZOLAM 0.5 MG/1
TABLET ORAL
Qty: 180 TAB | Refills: 0 | Status: SHIPPED | OUTPATIENT
Start: 2020-01-21 | End: 2020-04-02 | Stop reason: SDUPTHER

## 2020-03-30 ENCOUNTER — TELEPHONE (OUTPATIENT)
Dept: FAMILY MEDICINE CLINIC | Age: 60
End: 2020-03-30

## 2020-03-30 NOTE — TELEPHONE ENCOUNTER
----- Message from Lawson Diaz sent at 3/30/2020  7:28 AM EDT -----  Regarding: Dr. Elli Moore      Patient needs refill for Xanax. Please call to advise what her next steps are. Stated she usually has to see the doctor before next refill. If possible please call refill into pharmacy on file. [facility]. Patient will be out of medication by the end of the week. Best contact number is [phone].

## 2020-03-31 NOTE — TELEPHONE ENCOUNTER
Called and spoke with patient. Advised that she schedule a virtual visit. Patient verbalized understanding.  Apt scheduled for Thursday at 8:00am.

## 2020-04-02 ENCOUNTER — VIRTUAL VISIT (OUTPATIENT)
Dept: FAMILY MEDICINE CLINIC | Age: 60
End: 2020-04-02

## 2020-04-02 DIAGNOSIS — F41.9 ANXIETY: ICD-10-CM

## 2020-04-02 DIAGNOSIS — M79.2 NERVE PAIN: ICD-10-CM

## 2020-04-02 DIAGNOSIS — N64.4 BREAST PAIN, RIGHT: Primary | ICD-10-CM

## 2020-04-02 RX ORDER — ALPRAZOLAM 0.5 MG/1
TABLET ORAL
Qty: 180 TAB | Refills: 0 | Status: SHIPPED | OUTPATIENT
Start: 2020-04-02 | End: 2020-09-03 | Stop reason: SDUPTHER

## 2020-04-02 NOTE — PATIENT INSTRUCTIONS
A Healthy Lifestyle: Care Instructions Your Care Instructions A healthy lifestyle can help you feel good, stay at a healthy weight, and have plenty of energy for both work and play. A healthy lifestyle is something you can share with your whole family. A healthy lifestyle also can lower your risk for serious health problems, such as high blood pressure, heart disease, and diabetes. You can follow a few steps listed below to improve your health and the health of your family. Follow-up care is a key part of your treatment and safety. Be sure to make and go to all appointments, and call your doctor if you are having problems. It's also a good idea to know your test results and keep a list of the medicines you take. How can you care for yourself at home? · Do not eat too much sugar, fat, or fast foods. You can still have dessert and treats now and then. The goal is moderation. · Start small to improve your eating habits. Pay attention to portion sizes, drink less juice and soda pop, and eat more fruits and vegetables. ? Eat a healthy amount of food. A 3-ounce serving of meat, for example, is about the size of a deck of cards. Fill the rest of your plate with vegetables and whole grains. ? Limit the amount of soda and sports drinks you have every day. Drink more water when you are thirsty. ? Eat at least 5 servings of fruits and vegetables every day. It may seem like a lot, but it is not hard to reach this goal. A serving or helping is 1 piece of fruit, 1 cup of vegetables, or 2 cups of leafy, raw vegetables. Have an apple or some carrot sticks as an afternoon snack instead of a candy bar. Try to have fruits and/or vegetables at every meal. 
· Make exercise part of your daily routine. You may want to start with simple activities, such as walking, bicycling, or slow swimming. Try to be active 30 to 60 minutes every day.  You do not need to do all 30 to 60 minutes all at once. For example, you can exercise 3 times a day for 10 or 20 minutes. Moderate exercise is safe for most people, but it is always a good idea to talk to your doctor before starting an exercise program. 
· Keep moving. [place] the lawn, work in the garden, or [employer]. Take the stairs instead of the elevator at work. · If you smoke, quit. People who smoke have an increased risk for heart attack, stroke, cancer, and other lung illnesses. Quitting is hard, but there are ways to boost your chance of quitting tobacco for good. ? Use nicotine gum, patches, or lozenges. ? Ask your doctor about stop-smoking programs and medicines. ? Keep trying. In addition to reducing your risk of diseases in the future, you will notice some benefits soon after you stop using tobacco. If you have shortness of breath or asthma symptoms, they will likely get better within a few weeks after you quit. · Limit how much alcohol you drink. Moderate amounts of alcohol (up to 2 drinks a day for men, 1 drink a day for women) are okay. But drinking too much can lead to liver problems, high blood pressure, and other health problems. Family health If you have a family, there are many things you can do together to improve your health. · Eat meals together as a family as often as possible. · Eat healthy foods. This includes fruits, vegetables, lean meats and dairy, and whole grains. · Include your family in your fitness plan. Most people think of activities such as jogging or tennis as the way to fitness, but there are many ways you and your family can be more active. Anything that makes you breathe hard and gets your heart pumping is exercise. Here are some tips: 
? Walk to do errands or to take your child to school or the bus. 
? Go for a family bike ride after dinner instead of watching TV. Where can you learn more? Go to http://maurice-cristopher.info/ Enter D453 in the search box to learn more about \"A Healthy Lifestyle: Care Instructions. \" Current as of: May 28, 2019Content Version: 12.4 © 0146-2653 Healthwise, Incorporated. Care instructions adapted under license by [employer] (which disclaims liability or warranty for this information). If you have questions about a medical condition or this instruction, always ask your healthcare professional. [place] [address] any warranty or liability for your use of this information.

## 2020-04-02 NOTE — PROGRESS NOTES
Lazara Jang is a 60 y.o. female   Chief Complaint   Patient presents with    Anxiety    Pt with anxiety currently on xanax and this is working well. No hx of sedation or overuse.  checked. No hx of abuse or misuse. Pt also with rt breast pain hx of bx in 2018 which was benign. No masses. Will order a dx mammo. Pt also reports hx of injection in buttocks and has continued pain tho has improved some over time. Was done under fluoro and states known nerve hit. Pt is going to f/u with surgeon. she is a 60y.o. year old female who presents for evalution. Reviewed PmHx, RxHx, FmHx, SocHx, AllgHx and updated and dated in the chart. Review of Systems - negative except as listed above in the HPI    Objective: There were no vitals filed for this visit. Current Outpatient Medications   Medication Sig    ALPRAZolam (XANAX) 0.5 mg tablet TAKE 1 TABLET BY MOUTH TWICE DAILY    pantoprazole (PROTONIX) 40 mg tablet TAKE 1 TABLET DAILY by mouth    gabapentin (NEURONTIN) 100 mg capsule Take 1 Cap by mouth two (2) times a day. Max Daily Amount: 200 mg.  aspirin/salicylamide/caffeine (BC HEADACHE POWDER PO) Take 1 Package by mouth.  azelastine (ASTELIN) 137 mcg (0.1 %) nasal spray 1 Spray as needed for Rhinitis. Use in each nostril as directed    baclofen (LIORESAL) 10 mg tablet Take 1 Tab by mouth three (3) times daily as needed.  buPROPion XL (WELLBUTRIN XL) 150 mg tablet Take 1 Tab by mouth every morning.  lisinopril-hydroCHLOROthiazide (PRINZIDE, ZESTORETIC) 20-12.5 mg per tablet take 1 tablet by mouth once daily    cromolyn (OPTICROM) 4 % ophthalmic solution Administer 1 Drop to both eyes four (4) times daily. Use in affected eye(s)     No current facility-administered medications for this visit.         Physical Examination: General appearance - alert, well appearing, and in no distress  Mental status - alert, oriented to person, place, and time  Eyes - pupils equal and reactive, extraocular eye movements intact      Assessment/ Plan:   Diagnoses and all orders for this visit:    1. Breast pain, right  -     DANIELA MAMMO BI DX INCL CAD; Future    2. Anxiety  -     ALPRAZolam (XANAX) 0.5 mg tablet; TAKE 1 TABLET BY MOUTH TWICE DAILY    3. Nerve pain  Discussed usual clinical course and pt will f/u with surgeon     Follow-up and Dispositions    · Return in about 3 months (around 7/2/2020), or if symptoms worsen or fail to improve. I have discussed the diagnosis with the patient and the intended plan as seen in the above orders. The patient has received an after-visit summary and questions were answered concerning future plans. Pt conveyed understanding of plan. Medication Side Effects and Warnings were discussed with patient      June Velez DO   Pursuant to the emergency declaration under the [employer] and [facility], 7576 waiver authority and the [employer] and [employer] Act, this Virtual Visit was conducted, with patient's consent, to reduce the patient's risk of exposure to COVID-19 and provide continuity of care for an established patient. Services were provided through a video synchronous discussion virtually to substitute for in-person appointment.

## 2020-06-15 ENCOUNTER — HOSPITAL ENCOUNTER (OUTPATIENT)
Dept: MAMMOGRAPHY | Age: 60
Discharge: HOME | End: 2020-06-15
Attending: FAMILY MEDICINE
Payer: COMMERCIAL

## 2020-06-15 DIAGNOSIS — N64.4 BREAST PAIN, RIGHT: ICD-10-CM

## 2020-06-15 PROCEDURE — 77066 DX MAMMO INCL CAD BI: CPT

## 2020-09-03 ENCOUNTER — VIRTUAL VISIT (OUTPATIENT)
Dept: FAMILY MEDICINE CLINIC | Age: 60
End: 2020-09-03
Payer: COMMERCIAL

## 2020-09-03 DIAGNOSIS — I10 ESSENTIAL HYPERTENSION: ICD-10-CM

## 2020-09-03 DIAGNOSIS — F41.9 ANXIETY: ICD-10-CM

## 2020-09-03 PROCEDURE — 99442 PR PHYS/QHP TELEPHONE EVALUATION 11-20 MIN: CPT | Performed by: FAMILY MEDICINE

## 2020-09-03 RX ORDER — LISINOPRIL AND HYDROCHLOROTHIAZIDE 20; 25 MG/1; MG/1
1 TABLET ORAL DAILY
Qty: 30 TAB | Refills: 2 | Status: SHIPPED | OUTPATIENT
Start: 2020-09-03 | End: 2022-03-14 | Stop reason: SDUPTHER

## 2020-09-03 RX ORDER — ALPRAZOLAM 0.5 MG/1
TABLET ORAL
Qty: 180 TAB | Refills: 0 | Status: SHIPPED | OUTPATIENT
Start: 2020-09-03 | End: 2021-01-19 | Stop reason: SDUPTHER

## 2020-09-03 NOTE — PROGRESS NOTES
Delaine Soulier is a 63 y.o. female, evaluated via audio-only technology on 9/3/2020 for Medication Refill and Other (Pt stated that she checked her BP this morning and it's high. 171/91 (7:13am) 172/93 (7:21am) and 173/80 (7:35am))  . Assessment & Plan:   Diagnoses and all orders for this visit:    1. Anxiety  -     ALPRAZolam (XANAX) 0.5 mg tablet; TAKE 1 TABLET BY MOUTH TWICE DAILY as needed for anxiety    2. Essential hypertension  -     lisinopril-hydroCHLOROthiazide (PRINZIDE, ZESTORETIC) 20-25 mg per tablet; Take 1 Tab by mouth daily. F/u within 4 weeks for BP. C/w home monitoring  12  Subjective:   Pt for med refill of xanax, used for anxiety PRN. Last fill was in April. Works well when needed and no issues with misuse or abuse. Denies over sedation    States has been going thru some stress at work and states her BP has been spiking at 713am was 171/91 then at 721 was 172/73 again at 735 173/80. States has been having bad HA's with the elevated BP and feeling dizzy. Has restarted taking lisinopril hctz 20/12.5 and feel it is not helping much. Prior to Admission medications    Medication Sig Start Date End Date Taking? Authorizing Provider   ALPRAZomarcial Arora) 0.5 mg tablet TAKE 1 TABLET BY MOUTH TWICE DAILY as needed for anxiety 9/3/20  Yes Caitlin Melgar, DO   lisinopril-hydroCHLOROthiazide (PRINZIDE, ZESTORETIC) 20-25 mg per tablet Take 1 Tab by mouth daily. 9/3/20  Yes Caitlin Melgar, DO   pantoprazole (PROTONIX) 40 mg tablet TAKE 1 TABLET DAILY by mouth 10/17/19  Yes Caitlin Melgar, DO   aspirin/salicylamide/caffeine (BC HEADACHE POWDER PO) Take 1 Package by mouth. Yes Provider, Historical   baclofen (LIORESAL) 10 mg tablet Take 1 Tab by mouth three (3) times daily as needed. 8/13/18  Yes Merlin Havers, ARPITA   cromolyn (OPTICROM) 4 % ophthalmic solution Administer 1 Drop to both eyes four (4) times daily.  Use in affected eye(s)  Patient taking differently: Administer 1 Drop to both eyes as needed. Use in affected eye(s) 11/20/17  Yes Carolina King NP   ALPRAZolam [place]) 0.5 mg tablet TAKE 1 TABLET BY MOUTH TWICE DAILY 4/2/20 9/3/20  Caitlin Melgar DO   gabapentin (NEURONTIN) 100 mg capsule Take 1 Cap by mouth two (2) times a day. Max Daily Amount: 200 mg. 10/17/19   Caitlin Melgar DO   azelastine (ASTELIN) 137 mcg (0.1 %) nasal spray 1 Spray as needed for Rhinitis. Use in each nostril as directed    Provider, Historical   buPROPion XL (WELLBUTRIN XL) 150 mg tablet Take 1 Tab by mouth every morning. 5/11/18   Carolina King NP   lisinopril-hydroCHLOROthiazide [place], ZESTORETIC) 20-12.5 mg per tablet take 1 tablet by mouth once daily 12/22/17 9/3/20  Lay Fraser NP     Patient Active Problem List   Diagnosis Code    HTN (hypertension) I10    Anxiety F41.9    GERD without esophagitis K21.9    Spondylolisthesis of lumbar region M43.16    S/P lumbar spinal fusion Z98.1       ROS    No flowsheet data found. Janiya De Guzman, who was evaluated through a patient-initiated, synchronous (real-time) audio only encounter, and/or her healthcare decision maker, is aware that it is a billable service, with coverage as determined by her insurance carrier. She provided verbal consent to proceed: Yes. She has not had a related appointment within my department in the past 7 days or scheduled within the next 24 hours.       Total Time: minutes: 11-20 minutes    Winifred Melgar DO

## 2020-09-03 NOTE — PATIENT INSTRUCTIONS
A Healthy Lifestyle: Care Instructions Your Care Instructions A healthy lifestyle can help you feel good, stay at a healthy weight, and have plenty of energy for both work and play. A healthy lifestyle is something you can share with your whole family. A healthy lifestyle also can lower your risk for serious health problems, such as high blood pressure, heart disease, and diabetes. You can follow a few steps listed below to improve your health and the health of your family. Follow-up care is a key part of your treatment and safety. Be sure to make and go to all appointments, and call your doctor if you are having problems. It's also a good idea to know your test results and keep a list of the medicines you take. How can you care for yourself at home? · Do not eat too much sugar, fat, or fast foods. You can still have dessert and treats now and then. The goal is moderation. · Start small to improve your eating habits. Pay attention to portion sizes, drink less juice and soda pop, and eat more fruits and vegetables. ? Eat a healthy amount of food. A 3-ounce serving of meat, for example, is about the size of a deck of cards. Fill the rest of your plate with vegetables and whole grains. ? Limit the amount of soda and sports drinks you have every day. Drink more water when you are thirsty. ? Eat at least 5 servings of fruits and vegetables every day. It may seem like a lot, but it is not hard to reach this goal. A serving or helping is 1 piece of fruit, 1 cup of vegetables, or 2 cups of leafy, raw vegetables. Have an apple or some carrot sticks as an afternoon snack instead of a candy bar. Try to have fruits and/or vegetables at every meal. 
· Make exercise part of your daily routine. You may want to start with simple activities, such as walking, bicycling, or slow swimming. Try to be active 30 to 60 minutes every day.  You do not need to do all 30 to 60 minutes all at once. For example, you can exercise 3 times a day for 10 or 20 minutes. Moderate exercise is safe for most people, but it is always a good idea to talk to your doctor before starting an exercise program. 
· Keep moving. [place] the lawn, work in the garden, or [employer]. Take the stairs instead of the elevator at work. · If you smoke, quit. People who smoke have an increased risk for heart attack, stroke, cancer, and other lung illnesses. Quitting is hard, but there are ways to boost your chance of quitting tobacco for good. ? Use nicotine gum, patches, or lozenges. ? Ask your doctor about stop-smoking programs and medicines. ? Keep trying. In addition to reducing your risk of diseases in the future, you will notice some benefits soon after you stop using tobacco. If you have shortness of breath or asthma symptoms, they will likely get better within a few weeks after you quit. · Limit how much alcohol you drink. Moderate amounts of alcohol (up to 2 drinks a day for men, 1 drink a day for women) are okay. But drinking too much can lead to liver problems, high blood pressure, and other health problems. Family health If you have a family, there are many things you can do together to improve your health. · Eat meals together as a family as often as possible. · Eat healthy foods. This includes fruits, vegetables, lean meats and dairy, and whole grains. · Include your family in your fitness plan. Most people think of activities such as jogging or tennis as the way to fitness, but there are many ways you and your family can be more active. Anything that makes you breathe hard and gets your heart pumping is exercise. Here are some tips: 
? Walk to do errands or to take your child to school or the bus. 
? Go for a family bike ride after dinner instead of watching TV. Where can you learn more? Go to http://maurice-cristopher.info/ Enter M033 in the search box to learn more about \"A Healthy Lifestyle: Care Instructions. \" Current as of: January 31, 2020               Content Version: 12.5 © 2006-2020 Healthwise, Incorporated. Care instructions adapted under license by [employer] (which disclaims liability or warranty for this information). If you have questions about a medical condition or this instruction, always ask your healthcare professional. [place] [address] any warranty or liability for your use of this information.

## 2020-10-07 ENCOUNTER — VIRTUAL VISIT (OUTPATIENT)
Dept: FAMILY MEDICINE CLINIC | Age: 60
End: 2020-10-07
Payer: COMMERCIAL

## 2020-10-07 DIAGNOSIS — F41.9 ANXIETY: ICD-10-CM

## 2020-10-07 PROCEDURE — 99213 OFFICE O/P EST LOW 20 MIN: CPT | Performed by: NURSE PRACTITIONER

## 2020-10-07 RX ORDER — BUPROPION HYDROCHLORIDE 150 MG/1
150 TABLET ORAL
Qty: 30 TAB | Refills: 6 | Status: SHIPPED | OUTPATIENT
Start: 2020-10-07 | End: 2021-03-02 | Stop reason: ALTCHOICE

## 2020-10-07 NOTE — PROGRESS NOTES
Raman Veliz is a 63 y.o. female who was seen by synchronous (real-time) audio-video technology on 10/7/2020 for No chief complaint on file. I spent at least 15 minutes on this visit with this established patient. 712  Subjective:   Lots of stress at work related to dispute with coworker  Not sleeping  Worried a lot   Using her xanax but not enough    Prior to Admission medications    Medication Sig Start Date End Date Taking? Authorizing Provider   buPROPion XL (WELLBUTRIN XL) 150 mg tablet Take 1 Tab by mouth every morning. 10/7/20  Yes Marnie Lopez NP   ALPRAZolam [place]) 0.5 mg tablet TAKE 1 TABLET BY MOUTH TWICE DAILY as needed for anxiety 9/3/20   Caitlin Melgar,    lisinopril-hydroCHLOROthiazide (PRINZIDE, ZESTORETIC) 20-25 mg per tablet Take 1 Tab by mouth daily. 9/3/20   Caitlin Melgar,    pantoprazole (PROTONIX) 40 mg tablet TAKE 1 TABLET DAILY by mouth 10/17/19   Caitlin Melgar DO   gabapentin (NEURONTIN) 100 mg capsule Take 1 Cap by mouth two (2) times a day. Max Daily Amount: 200 mg. 10/17/19   Tl Melgar,    aspirin/salicylamide/caffeine (BC HEADACHE POWDER PO) Take 1 Package by mouth. Provider, Historical   azelastine (ASTELIN) 137 mcg (0.1 %) nasal spray 1 Spray as needed for Rhinitis. Use in each nostril as directed    Provider, Historical   baclofen (LIORESAL) 10 mg tablet Take 1 Tab by mouth three (3) times daily as needed. 8/13/18   Alicia Sanchez NP   buPROPion XL (WELLBUTRIN XL) 150 mg tablet Take 1 Tab by mouth every morning. 5/11/18 10/7/20  Alicia Sanchez NP   cromolyn (OPTICROM) 4 % ophthalmic solution Administer 1 Drop to both eyes four (4) times daily. Use in affected eye(s)  Patient taking differently: Administer 1 Drop to both eyes as needed.  Use in affected eye(s) 11/20/17   Alicia Sanchez NP         ROS  A comprehensive review of system was obtained and negative except findings in the HPI    Objective:     Patient-Reported Vitals 10/7/2020   Patient-Reported Weight 150   Patient-Reported Height 52   Patient-Reported Systolic  669   Patient-Reported Diastolic 83      General: alert, cooperative, no distress   Mental  status: normal mood, behavior, speech, dress, motor activity, and thought processes, able to follow commands   HENT: NCAT   Neck: no visualized mass   Resp: no respiratory distress   Neuro: no gross deficits   Skin: no discoloration or lesions of concern on visible areas   Psychiatric: normal affect, consistent with stated mood, no evidence of hallucinations     Additional exam findings: none    Diagnoses and all orders for this visit:    1. Anxiety  -     buPROPion XL (WELLBUTRIN XL) 150 mg tablet; Take 1 Tab by mouth every morning. Restart wellbutrin xl 150mg a day  Stay on xanax as well  Follow up 3 weeks with progress      We discussed the expected course, resolution and complications of the diagnosis(es) in detail. Medication risks, benefits, costs, interactions, and alternatives were discussed as indicated. I advised her to contact the office if her condition worsens, changes or fails to improve as anticipated. She expressed understanding with the diagnosis(es) and plan. Jaylin Pereira, who was evaluated through a patient-initiated, synchronous (real-time) audio-video encounter, and/or her healthcare decision maker, is aware that it is a billable service, with coverage as determined by her insurance carrier. She provided verbal consent to proceed: Yes, and patient identification was verified. It was conducted pursuant to the emergency declaration under the [facility], [address] [place] [address] authority and the [employer] and [employer] Act. A caregiver was present when appropriate. Ability to conduct physical exam was limited. I was at home. The patient was at home.       Sheba Wallace NP

## 2021-01-14 ENCOUNTER — TELEPHONE (OUTPATIENT)
Dept: FAMILY MEDICINE CLINIC | Age: 61
End: 2021-01-14

## 2021-01-14 DIAGNOSIS — K21.9 GERD WITHOUT ESOPHAGITIS: ICD-10-CM

## 2021-01-14 RX ORDER — PANTOPRAZOLE SODIUM 40 MG/1
TABLET, DELAYED RELEASE ORAL
Qty: 90 TAB | Refills: 4 | Status: SHIPPED | OUTPATIENT
Start: 2021-01-14 | End: 2022-03-14 | Stop reason: SDUPTHER

## 2021-01-14 NOTE — TELEPHONE ENCOUNTER
----- Message from Anuj Diane sent at 1/14/2021  7:42 AM EST -----  Regarding: ARPITA Wood/Refill  Medication Refill    Caller (if not patient):      Relationship of caller (if not patient):      Best contact number(s):[phone]      Name of medication and dosage if known: Pantoprazole      Is patient out of this medication (yes/no):  no    Pharmacy name:[facility]    Pharmacy listed in chart? (yes/no): yes  Pharmacy phone number:      Details to clarify the request:refill of Pantoprazole      Anuj Diane

## 2021-01-19 ENCOUNTER — OFFICE VISIT (OUTPATIENT)
Dept: FAMILY MEDICINE CLINIC | Age: 61
End: 2021-01-19
Payer: COMMERCIAL

## 2021-01-19 VITALS
HEIGHT: 62 IN | WEIGHT: 163 LBS | SYSTOLIC BLOOD PRESSURE: 165 MMHG | OXYGEN SATURATION: 98 % | RESPIRATION RATE: 14 BRPM | BODY MASS INDEX: 30 KG/M2 | HEART RATE: 77 BPM | TEMPERATURE: 98.7 F | DIASTOLIC BLOOD PRESSURE: 83 MMHG

## 2021-01-19 DIAGNOSIS — F41.9 ANXIETY: ICD-10-CM

## 2021-01-19 DIAGNOSIS — M54.2 NECK PAIN ON RIGHT SIDE: ICD-10-CM

## 2021-01-19 DIAGNOSIS — M54.31 RIGHT SIDED SCIATICA: Primary | ICD-10-CM

## 2021-01-19 PROCEDURE — 99213 OFFICE O/P EST LOW 20 MIN: CPT | Performed by: NURSE PRACTITIONER

## 2021-01-19 RX ORDER — BACLOFEN 10 MG/1
10 TABLET ORAL
Qty: 30 TAB | Refills: 2 | Status: SHIPPED | OUTPATIENT
Start: 2021-01-19 | End: 2021-03-02 | Stop reason: ALTCHOICE

## 2021-01-19 RX ORDER — PREDNISONE 10 MG/1
TABLET ORAL
Qty: 21 TAB | Refills: 0 | Status: SHIPPED | OUTPATIENT
Start: 2021-01-19 | End: 2021-03-02 | Stop reason: ALTCHOICE

## 2021-01-19 RX ORDER — ALPRAZOLAM 0.5 MG/1
TABLET ORAL
Qty: 180 TAB | Refills: 0 | Status: SHIPPED | OUTPATIENT
Start: 2021-01-19 | End: 2021-07-21 | Stop reason: SDUPTHER

## 2021-01-19 NOTE — PROGRESS NOTES
Chief Complaint   Patient presents with    Back Pain    Medication Refill     Pt in office today for back pain  -pt states that she has been having pain going down her leg  -pt would like a med refill on xanax     1. Have you been to the ER, urgent care clinic since your last visit? Hospitalized since your last visit? No    2. Have you seen or consulted any other health care providers outside of the [address] [place] [address] since your last visit? Include any pap smears or colon screening.  No     Pt has no other concerns

## 2021-01-21 NOTE — PROGRESS NOTES
HISTORY OF PRESENT ILLNESS  Lydia Oliveira is a 63 y.o. female. HPI    Pt in office today for back pain  -pt states that she has been having pain going down her leg  Has been out of muscle relaxers, nsaids not helping  Denies injury    -pt would like a med refill on xanax   No early refill requests, using sparingly    ROS  A comprehensive review of system was obtained and negative except findings in the HPI    Visit Vitals  BP (!) 165/83 (BP 1 Location: Right arm, BP Patient Position: Sitting)   Pulse 77   Temp 98.7 °F (37.1 °C) (Oral)   Resp 14   Ht 5' 2\" (1.575 m)   Wt 163 lb (73.9 kg)   SpO2 98%   BMI 29.81 kg/m²     Physical Exam  Vitals signs and nursing note reviewed. Constitutional:       Appearance: She is well-developed. Comments:      Neck:      Vascular: No JVD. Cardiovascular:      Rate and Rhythm: Normal rate and regular rhythm. Heart sounds: No murmur. No friction rub. No gallop. Pulmonary:      Effort: Pulmonary effort is normal. No respiratory distress. Breath sounds: Normal breath sounds. No wheezing. Musculoskeletal:         General: Tenderness present. Skin:     General: Skin is warm. Neurological:      Mental Status: She is alert and oriented to person, place, and time. ASSESSMENT and PLAN  Encounter Diagnoses   Name Primary?  Right sided sciatica Yes    Anxiety     Neck pain on right side      Orders Placed This Encounter    ALPRAZolam (XANAX) 0.5 mg tablet    baclofen (LIORESAL) 10 mg tablet    predniSONE (STERAPRED DS) 10 mg dose pack     Refills of xanax given  Refilled baclofen and added pred pack  Adr/se of med reviewed  xrays if not improving    I have discussed the diagnosis with the patient and the intended plan as seen in the above orders. The patient has received an after-visit summary and questions were answered concerning future plans. Patient conveyed understanding of the plan at the time of the visit.     Nawaf Vincent, MSN, ANP 1/21/2021

## 2021-03-02 ENCOUNTER — OFFICE VISIT (OUTPATIENT)
Dept: FAMILY MEDICINE CLINIC | Age: 61
End: 2021-03-02
Payer: COMMERCIAL

## 2021-03-02 VITALS
WEIGHT: 158 LBS | HEIGHT: 62 IN | DIASTOLIC BLOOD PRESSURE: 75 MMHG | BODY MASS INDEX: 29.08 KG/M2 | RESPIRATION RATE: 12 BRPM | OXYGEN SATURATION: 99 % | TEMPERATURE: 97.8 F | HEART RATE: 80 BPM | SYSTOLIC BLOOD PRESSURE: 153 MMHG

## 2021-03-02 DIAGNOSIS — M79.2 NERVE PAIN: ICD-10-CM

## 2021-03-02 DIAGNOSIS — E61.1 IRON DEFICIENCY: ICD-10-CM

## 2021-03-02 DIAGNOSIS — Z00.00 WELL ADULT EXAM: Primary | ICD-10-CM

## 2021-03-02 DIAGNOSIS — M54.31 RIGHT SIDED SCIATICA: ICD-10-CM

## 2021-03-02 LAB
ALBUMIN SERPL-MCNC: 3.6 G/DL (ref 3.5–5)
ALBUMIN/GLOB SERPL: 1.2 {RATIO} (ref 1.1–2.2)
ALP SERPL-CCNC: 101 U/L (ref 45–117)
ALT SERPL-CCNC: 7 U/L (ref 12–78)
ANION GAP SERPL CALC-SCNC: 5 MMOL/L (ref 5–15)
AST SERPL-CCNC: 8 U/L (ref 15–37)
BASOPHILS # BLD: 0 K/UL (ref 0–0.1)
BASOPHILS NFR BLD: 1 % (ref 0–1)
BILIRUB SERPL-MCNC: 0.3 MG/DL (ref 0.2–1)
BUN SERPL-MCNC: 12 MG/DL (ref 6–20)
BUN/CREAT SERPL: 16 (ref 12–20)
CALCIUM SERPL-MCNC: 8.7 MG/DL (ref 8.5–10.1)
CHLORIDE SERPL-SCNC: 113 MMOL/L (ref 97–108)
CHOLEST SERPL-MCNC: 213 MG/DL
CO2 SERPL-SCNC: 24 MMOL/L (ref 21–32)
CREAT SERPL-MCNC: 0.77 MG/DL (ref 0.55–1.02)
DIFFERENTIAL METHOD BLD: ABNORMAL
EOSINOPHIL # BLD: 0 K/UL (ref 0–0.4)
EOSINOPHIL NFR BLD: 1 % (ref 0–7)
ERYTHROCYTE [DISTWIDTH] IN BLOOD BY AUTOMATED COUNT: 20.5 % (ref 11.5–14.5)
GLOBULIN SER CALC-MCNC: 3.1 G/DL (ref 2–4)
GLUCOSE SERPL-MCNC: 76 MG/DL (ref 65–100)
HCT VFR BLD AUTO: 27.3 % (ref 35–47)
HDLC SERPL-MCNC: 102 MG/DL
HDLC SERPL: 2.1 {RATIO} (ref 0–5)
HGB BLD-MCNC: 8 G/DL (ref 11.5–16)
IMM GRANULOCYTES # BLD AUTO: 0 K/UL (ref 0–0.04)
IMM GRANULOCYTES NFR BLD AUTO: 0 % (ref 0–0.5)
IRON SATN MFR SERPL: 3 % (ref 20–50)
IRON SERPL-MCNC: 17 UG/DL (ref 35–150)
LDLC SERPL CALC-MCNC: 103.4 MG/DL (ref 0–100)
LIPID PROFILE,FLP: ABNORMAL
LYMPHOCYTES # BLD: 1 K/UL (ref 0.8–3.5)
LYMPHOCYTES NFR BLD: 21 % (ref 12–49)
MCH RBC QN AUTO: 23.4 PG (ref 26–34)
MCHC RBC AUTO-ENTMCNC: 29.3 G/DL (ref 30–36.5)
MCV RBC AUTO: 79.8 FL (ref 80–99)
MONOCYTES # BLD: 0.5 K/UL (ref 0–1)
MONOCYTES NFR BLD: 10 % (ref 5–13)
NEUTS SEG # BLD: 3.1 K/UL (ref 1.8–8)
NEUTS SEG NFR BLD: 67 % (ref 32–75)
NRBC # BLD: 0 K/UL (ref 0–0.01)
NRBC BLD-RTO: 0 PER 100 WBC
PLATELET # BLD AUTO: 415 K/UL (ref 150–400)
PMV BLD AUTO: 12.1 FL (ref 8.9–12.9)
POTASSIUM SERPL-SCNC: 3.9 MMOL/L (ref 3.5–5.1)
PROT SERPL-MCNC: 6.7 G/DL (ref 6.4–8.2)
RBC # BLD AUTO: 3.42 M/UL (ref 3.8–5.2)
RBC MORPH BLD: ABNORMAL
SODIUM SERPL-SCNC: 142 MMOL/L (ref 136–145)
TIBC SERPL-MCNC: 540 UG/DL (ref 250–450)
TRIGL SERPL-MCNC: 38 MG/DL (ref ?–150)
TSH SERPL DL<=0.05 MIU/L-ACNC: 0.67 UIU/ML (ref 0.36–3.74)
VLDLC SERPL CALC-MCNC: 7.6 MG/DL
WBC # BLD AUTO: 4.6 K/UL (ref 3.6–11)

## 2021-03-02 PROCEDURE — 99396 PREV VISIT EST AGE 40-64: CPT | Performed by: NURSE PRACTITIONER

## 2021-03-02 RX ORDER — METHOCARBAMOL 500 MG/1
500 TABLET, FILM COATED ORAL
Qty: 60 TAB | Refills: 1 | Status: SHIPPED | OUTPATIENT
Start: 2021-03-02 | End: 2021-07-21 | Stop reason: SDUPTHER

## 2021-03-02 RX ORDER — MELOXICAM 7.5 MG/1
7.5 TABLET ORAL DAILY
Qty: 30 TAB | Refills: 5 | Status: SHIPPED | OUTPATIENT
Start: 2021-03-02

## 2021-03-02 RX ORDER — GABAPENTIN 100 MG/1
100 CAPSULE ORAL 2 TIMES DAILY
Qty: 60 CAP | Refills: 2 | Status: SHIPPED | OUTPATIENT
Start: 2021-03-02 | End: 2022-07-25

## 2021-03-02 NOTE — PROGRESS NOTES
Chief Complaint   Patient presents with    Complete Physical    Labs     Pt in office today for cpe  -labs  -pt would like to discuss back pain  -pt states it has been hurting her    1. Have you been to the ER, urgent care clinic since your last visit? Hospitalized since your last visit? No    2. Have you seen or consulted any other health care providers outside of the [address] [place] [address] since your last visit? Include any pap smears or colon screening.  No     Pt has no other concerns

## 2021-03-04 NOTE — PROGRESS NOTES
As expected your iron is critically low. You need to be seen by a hematologist for evaluation for an iron infusion. Please contact Dr. Rj Montes for an appointment. In the meantime you need to be on twice a day iron which I have sent to your pharmacy. The rest of the labs look great.  [address] [place] [address]

## 2021-03-07 NOTE — PROGRESS NOTES
Subjective:   63 y.o. female for Well Woman Check. Her gyne and breast care is done elsewhere by her Ob-Gyne physician. Patient Active Problem List    Diagnosis Date Noted    Spondylolisthesis of lumbar region 09/26/2018    S/P lumbar spinal fusion 09/26/2018    GERD without esophagitis 06/03/2015    Anxiety 06/22/2012    HTN (hypertension) 05/02/2012     Current Outpatient Medications   Medication Sig Dispense Refill    methocarbamoL (ROBAXIN) 500 mg tablet Take 1 Tab by mouth three (3) times daily as needed for Muscle Spasm(s). 60 Tab 1    gabapentin (NEURONTIN) 100 mg capsule Take 1 Cap by mouth two (2) times a day. Max Daily Amount: 200 mg. 60 Cap 2    meloxicam (MOBIC) 7.5 mg tablet Take 1 Tab by mouth daily. 30 Tab 5    ALPRAZolam (XANAX) 0.5 mg tablet TAKE 1 TABLET BY MOUTH TWICE DAILY as needed for anxiety 180 Tab 0    pantoprazole (PROTONIX) 40 mg tablet TAKE 1 TABLET DAILY by mouth 90 Tab 4    lisinopril-hydroCHLOROthiazide (PRINZIDE, ZESTORETIC) 20-25 mg per tablet Take 1 Tab by mouth daily. 30 Tab 2    aspirin/salicylamide/caffeine (BC HEADACHE POWDER PO) Take 1 Package by mouth.  azelastine (ASTELIN) 137 mcg (0.1 %) nasal spray 1 Spray as needed for Rhinitis. Use in each nostril as directed      cromolyn (OPTICROM) 4 % ophthalmic solution Administer 1 Drop to both eyes four (4) times daily. Use in affected eye(s) (Patient taking differently: Administer 1 Drop to both eyes as needed. Use in affected eye(s)) 10 mL 0    ferrous sulfate (SLOW FE) 142 mg (45 mg iron) ER tablet Take 1 Tab by mouth two (2) times a day.  61 Tab 5     Family History   Problem Relation Age of Onset    Diabetes Mother     Heart Disease Mother     Diabetes Father     Heart Disease Father     Other Sister         IBS/bowel intestine problems    Breast Cancer Sister 72    Breast Cancer Paternal Aunt         3 paunts, 5 pcousins     Social History     Tobacco Use    Smoking status: Current Every Day Smoker     Packs/day: 0.50     Years: 25.00     Pack years: 12.50     Types: Cigarettes    Smokeless tobacco: Never Used   Substance Use Topics    Alcohol use: Yes     Alcohol/week: 0.0 standard drinks     Comment: rare/1-2 per month             ROS: Feeling generally well. No TIA's or unusual headaches, no dysphagia. No prolonged cough. No dyspnea or chest pain on exertion. No abdominal pain, change in bowel habits, black or bloody stools. No urinary tract symptoms. No new or unusual musculoskeletal symptoms. Specific concerns today: she is due for fasting labs; having sciatica pain of sanaz legs; due for iron recheck for anemia, eats a lot of ice. Objective: The patient appears well, alert, oriented x 3, in no distress. Visit Vitals  BP (!) 153/75 (BP 1 Location: Right arm, BP Patient Position: Sitting)   Pulse 80   Temp 97.8 °F (36.6 °C) (Oral)   Resp 12   Ht 5' 2\" (1.575 m)   Wt 158 lb (71.7 kg)   SpO2 99%   BMI 28.90 kg/m²     ENT normal.  Neck supple. No adenopathy or thyromegaly. YUVAL. Lungs are clear, good air entry, no wheezes, rhonchi or rales. S1 and S2 normal, no murmurs, regular rate and rhythm. Abdomen soft without tenderness, guarding, mass or organomegaly. Extremities show no edema, normal peripheral pulses. Neurological is normal, no focal findings. Breast and Pelvic exams are deferred. Assessment/Plan:   Well Woman  lose weight, increase physical activity, follow low fat diet, follow low salt diet, routine labs ordered  Encounter Diagnoses   Name Primary?     Well adult exam Yes    Nerve pain     Right sided sciatica     Iron deficiency      Orders Placed This Encounter    LIPID PANEL    CBC WITH AUTOMATED DIFF    METABOLIC PANEL, COMPREHENSIVE    TSH 3RD GENERATION    IRON PROFILE    methocarbamoL (ROBAXIN) 500 mg tablet    gabapentin (NEURONTIN) 100 mg capsule    meloxicam (MOBIC) 7.5 mg tablet     Given gabapentin refill and start mobic for joint pain and OA  Refilled robaxin for spasms  Labs updated  Depending on how low iron is may need referral to Hem/Onc for eval    I have discussed the diagnosis with the patient and the intended plan as seen in the above orders. The patient has received an after-visit summary and questions were answered concerning future plans. Patient conveyed understanding of the plan at the time of the visit.     Faye Granger, MSN, ANP  3/7/2021

## 2021-03-08 NOTE — PROGRESS NOTES
Spoke with pt she states she saw her [facility] message and cant get into dr johnson until may. Encouraged to take iron as directed by provider until she is seen.  She verbalized understanding

## 2021-03-25 ENCOUNTER — VIRTUAL VISIT (OUTPATIENT)
Dept: ONCOLOGY | Age: 61
End: 2021-03-25
Payer: COMMERCIAL

## 2021-03-25 DIAGNOSIS — D50.9 MICROCYTIC ANEMIA: Primary | ICD-10-CM

## 2021-03-25 PROCEDURE — 99204 OFFICE O/P NEW MOD 45 MIN: CPT | Performed by: STUDENT IN AN ORGANIZED HEALTH CARE EDUCATION/TRAINING PROGRAM

## 2021-03-25 RX ORDER — ONDANSETRON 2 MG/ML
8 INJECTION INTRAMUSCULAR; INTRAVENOUS AS NEEDED
Status: CANCELLED | OUTPATIENT
Start: 2021-03-29

## 2021-03-25 RX ORDER — SODIUM CHLORIDE 9 MG/ML
10 INJECTION INTRAMUSCULAR; INTRAVENOUS; SUBCUTANEOUS AS NEEDED
Status: CANCELLED | OUTPATIENT
Start: 2021-03-29

## 2021-03-25 RX ORDER — DIPHENHYDRAMINE HYDROCHLORIDE 50 MG/ML
50 INJECTION, SOLUTION INTRAMUSCULAR; INTRAVENOUS AS NEEDED
Status: CANCELLED
Start: 2021-03-29

## 2021-03-25 RX ORDER — SODIUM CHLORIDE 0.9 % (FLUSH) 0.9 %
10 SYRINGE (ML) INJECTION AS NEEDED
Status: CANCELLED | OUTPATIENT
Start: 2021-03-29

## 2021-03-25 RX ORDER — ACETAMINOPHEN 325 MG/1
650 TABLET ORAL AS NEEDED
Status: CANCELLED
Start: 2021-03-29

## 2021-03-25 RX ORDER — HEPARIN 100 UNIT/ML
300-500 SYRINGE INTRAVENOUS AS NEEDED
Status: CANCELLED
Start: 2021-03-29

## 2021-03-25 RX ORDER — SODIUM CHLORIDE 9 MG/ML
25 INJECTION, SOLUTION INTRAVENOUS CONTINUOUS
Status: CANCELLED | OUTPATIENT
Start: 2021-03-29

## 2021-03-25 RX ORDER — HYDROCORTISONE SODIUM SUCCINATE 100 MG/2ML
100 INJECTION, POWDER, FOR SOLUTION INTRAMUSCULAR; INTRAVENOUS AS NEEDED
Status: CANCELLED | OUTPATIENT
Start: 2021-03-29

## 2021-03-25 RX ORDER — ALBUTEROL SULFATE 0.83 MG/ML
2.5 SOLUTION RESPIRATORY (INHALATION) AS NEEDED
Status: CANCELLED
Start: 2021-03-29

## 2021-03-25 RX ORDER — EPINEPHRINE 1 MG/ML
0.3 INJECTION, SOLUTION, CONCENTRATE INTRAVENOUS AS NEEDED
Status: CANCELLED | OUTPATIENT
Start: 2021-03-29

## 2021-03-25 RX ORDER — DIPHENHYDRAMINE HYDROCHLORIDE 50 MG/ML
25 INJECTION, SOLUTION INTRAMUSCULAR; INTRAVENOUS AS NEEDED
Status: CANCELLED
Start: 2021-03-29

## 2021-03-25 NOTE — PROGRESS NOTES
Lily Salcido is a 63 y.o. female  Chief Complaint   Patient presents with    New Patient    Anemia     1. Have you been to the ER, urgent care clinic since your last visit? Hospitalized since your last visit? No    2. Have you seen or consulted any other health care providers outside of the [address] [place] [address] since your last visit? Include any pap smears or colon screening. No    Patient stated she had back surgery and has PRN medications to help manage any symptoms from the surgery. The patient also stated that she gets dizzy a few times a week at random times and can be sitting or standing.

## 2021-03-25 NOTE — PROGRESS NOTES
[facility] HEMATOLOGY/ONCOLOGY CLINIC NOTE    ID: Domenico Myers is a 63 y.o. female    PCP: Rowena Gunn NP    Referral Requested by: Rowena Gunn NP    Chief Complaint/Reason for Referral: Microcytic Anemia   Chief Complaint   Patient presents with    New Patient    Anemia         HISTORY OF PRESENT ILLNESS:    Diagnosis:   1) Microcytic Anemia       Date of Diagnosis: 4/2016    Current Treatment: Oral Iron BID      Hematologic/Oncologic History:     Briefly this is a very pleasant 18-year-old -American female with a past medical history most notable for peptic ulcer disease, GERD, anxiety/depression, hypothyroidism, asthma, and a longstanding history of iron deficiency anemia for which the patient had been taking oral iron intermittently. On chart review, the patient is seen to have had evidence of iron deficiency with microcytic anemia that was worsening since September 2018. The patient reports that she has a distant history of peptic ulcer disease but is currently having symptoms that are consistent with past episodes of gastric ulcers. She describes aching pain at night that wakes her from sleep in the stomach that is relieved by acid reflux medications. The patient does take Protonix daily, and reports that she is requiring this medicine in order to maintain management of her GERD and stomach discomfort. The patient has been taking oral iron supplementation as prescribed, however she has been having significant difficulty with toleration as the medication causes constipation and nausea. The patient has tried MiraLAX to relieve some of her constipation symptoms of this continues to be problematic for her. Otherwise, the patient does have some constitutional symptoms of dizziness and fatigue. She does report cravings for ice and admits to restless legs at night.     No other symptoms of weight loss, night sweats, bone pain, or any other constitutional symptoms. Family history reviewed, significant for breast cancer in her sister age 61. No other known cancer history in the family. The patient denies any family history of thalassemia or other blood disorder. Social history reviewed, significant for only tobacco use, half a pack per day for the last 30 years. No reported illicit drug use. ECOG Performance Status: (0) Fully active, able to carry on all predisease performance without restriction      ALLERGIES:  No Known Allergies    MEDICATIONS:  Current Outpatient Medications on File Prior to Visit   Medication Sig Dispense Refill    ferrous sulfate (SLOW FE) 142 mg (45 mg iron) ER tablet Take 1 Tab by mouth two (2) times a day. (Patient taking differently: Take 142 mg by mouth daily.) 60 Tab 5    methocarbamoL (ROBAXIN) 500 mg tablet Take 1 Tab by mouth three (3) times daily as needed for Muscle Spasm(s). 60 Tab 1    gabapentin (NEURONTIN) 100 mg capsule Take 1 Cap by mouth two (2) times a day. Max Daily Amount: 200 mg. 60 Cap 2    meloxicam (MOBIC) 7.5 mg tablet Take 1 Tab by mouth daily. 30 Tab 5    ALPRAZolam (XANAX) 0.5 mg tablet TAKE 1 TABLET BY MOUTH TWICE DAILY as needed for anxiety 180 Tab 0    pantoprazole (PROTONIX) 40 mg tablet TAKE 1 TABLET DAILY by mouth 90 Tab 4    lisinopril-hydroCHLOROthiazide (PRINZIDE, ZESTORETIC) 20-25 mg per tablet Take 1 Tab by mouth daily. 30 Tab 2    aspirin/salicylamide/caffeine (BC HEADACHE POWDER PO) Take 1 Package by mouth.  azelastine (ASTELIN) 137 mcg (0.1 %) nasal spray 1 Spray as needed for Rhinitis. Use in each nostril as directed      cromolyn (OPTICROM) 4 % ophthalmic solution Administer 1 Drop to both eyes four (4) times daily. Use in affected eye(s) (Patient taking differently: Administer 1 Drop to both eyes as needed. Use in affected eye(s)) 10 mL 0     No current facility-administered medications on file prior to visit.         PMH:  Past Medical History:   Diagnosis Date    Chronic pain     Hypertension     Microcytic anemia 3/25/2021    Non-nicotine vapor product user     Psychiatric disorder     anxiety     Past Surgical History:   Procedure Laterality Date    HC LAP BAND ADJUST PROCEDURE      HX BREAST BIOPSY Left     years ago    HX BREAST BIOPSY Right 06/2018    Benign    HX CERVICAL FUSION      HX CHOLECYSTECTOMY      HX GASTRIC BYPASS      HX HYSTERECTOMY      HX ORTHOPAEDIC      foot surgery--bunions--bilateral    HX ORTHOPAEDIC      ACDF    UT REMOVAL GALLBLADDER       Patient Active Problem List   Diagnosis Code    HTN (hypertension) I10    Anxiety F41.9    GERD without esophagitis K21.9    Spondylolisthesis of lumbar region M43.16    S/P lumbar spinal fusion Z98.1    Microcytic anemia D50.9       SOCIAL AND FAMILY HISTORY:  Social History     Socioeconomic History    Marital status:      Spouse name: Not on file    Number of children: Not on file    Years of education: Not on file    Highest education level: Not on file   Tobacco Use    Smoking status: Current Every Day Smoker     Packs/day: 0.50     Years: 25.00     Pack years: 12.50     Types: Cigarettes    Smokeless tobacco: Never Used   Substance and Sexual Activity    Alcohol use: Yes     Alcohol/week: 0.0 standard drinks     Comment: rare/1-2 per month    Drug use: No    Sexual activity: Yes     Partners: Male     Birth control/protection: None     family history includes Breast Cancer in her paternal aunt; Breast Cancer (age of onset: 72) in her sister; Diabetes in her father and mother; Heart Disease in her father and mother; Other in her sister. REVIEW OF SYSTEMS:   Review of Systems   Constitutional: Positive for malaise/fatigue. Negative for fever and weight loss. HENT: Negative. Negative for nosebleeds. Eyes: Negative. Respiratory: Negative. Negative for cough, hemoptysis and shortness of breath. Cardiovascular: Negative. Negative for chest pain and leg swelling. Gastrointestinal: Positive for abdominal pain and constipation. Negative for blood in stool, diarrhea, heartburn, melena, nausea and vomiting. Genitourinary: Negative. Negative for hematuria. Musculoskeletal: Negative. Negative for falls and myalgias. Skin: Negative. Negative for rash. Neurological: Negative. Endo/Heme/Allergies: Negative. Psychiatric/Behavioral: The patient is nervous/anxious. PHYSICAL EXAMINATION:  Vitals:         Physical Exam  Objective:   Vital Signs: (As obtained by patient/caregiver at home)  There were no vitals taken for this visit.      [INSTRUCTIONS:  \"[x]\" Indicates a positive item  \"[]\" Indicates a negative item  -- DELETE ALL ITEMS NOT EXAMINED]    Constitutional: [x] Appears well-developed and well-nourished [x] No apparent distress      [] Abnormal -     Mental status: [x] Alert and awake  [x] Oriented to person/place/time [x] Able to follow commands    [] Abnormal -     Eyes:   EOM    [x]  Normal    [] Abnormal -   Sclera  [x]  Normal    [] Abnormal -          Discharge [x]  None visible   [] Abnormal -     HENT: [x] Normocephalic, atraumatic  [] Abnormal -   [x] Mouth/Throat: Mucous membranes are moist    External Ears [x] Normal  [] Abnormal -    Neck: [x] No visualized mass [] Abnormal -     Pulmonary/Chest: [x] Respiratory effort normal   [x] No visualized signs of difficulty breathing or respiratory distress        [] Abnormal -      Musculoskeletal:   [x] Normal gait with no signs of ataxia         [x] Normal range of motion of neck        [] Abnormal -     Neurological:        [x] No Facial Asymmetry (Cranial nerve 7 motor function) (limited exam due to video visit)          [x] No gaze palsy        [] Abnormal -          Skin:        [x] No significant exanthematous lesions or discoloration noted on facial skin         [] Abnormal -            Psychiatric:       [x] Normal Affect [] Abnormal -        [x] No Hallucinations    Other pertinent observable physical exam findings:-      DATA REVIEW:     Lab Results   Component Value Date    HGB 8.0 (L) 03/02/2021    HCT 27.3 (L) 03/02/2021    WBC 4.6 03/02/2021     (H) 03/02/2021       No components found for: NEUTOPHILPCT, LYMPHOPCT, MONOPCT, EOSPCT  Lab Results   Component Value Date     03/02/2021    K 3.9 03/02/2021     (H) 03/02/2021    BUN 12 03/02/2021     Lab Results   Component Value Date    AST 8 (L) 03/02/2021    ALT 7 (L) 03/02/2021    TBILI 0.3 03/02/2021     Chart from patient's PCP reviewed lab values to detailed in HPI. Most recent iron saturation of only 3%, elevated TIBC, no ferritin was ordered. ASSESSMENT & PLAN:    39-year-old female with a past medical history notable for gastroesophageal reflux disorder, history of gastric ulcers, anxiety, history of spinal stenosis, presents for evaluation of treatment of chronic microcytic anemia that has been refractory to oral iron supplementation. The patient has a history of gastric ulcers, and is currently presenting with similar symptoms to previous episodes of bleeding gastric ulcer. For now, the patient has not yet been evaluated by GI. She has had a colonoscopy in 2014 that was normal.      - # Microcytic Anemia of unclear etiology   - The most common cause of microcytic anemia is iron deficiency. Other causes include anemia of inflammation, thalassemia, drug effect, alcohol use, copper deficiency. It is important to note that the typical causes of normocytic anemia can occasional also present with microcytosis. Based on the patients history, Protonix use, suspicion for gastric ulcer disease, it is likely that the patient has some amount of chronic blood loss that is contributing to her microcytic anemia and iron deficiency.    -The patient denies any melanotic or very very dark stools.   She does admit to having increased constipation recently though she relates this to some impact from oral iron.    -Would advise to obtain gastroenterology consult to rule out peptic ulcer disease as well as evaluate for any blood in stool. I will start by obtaining some basic anemia labs, to include CBC with diff, reticulocyte count, iron profile, ferritin, LD, SPEP. -We will plan for IV iron supplementation with Injectafer 750 mg weekly x2 weeks as the patient has evidence of iron deficiency anemia that is refractory to oral iron supplementation, as well as oral iron intolerance due to side effect impacting compliance. Return visit:    Follow-up and Dispositions    · Return in about 8 weeks (around 5/20/2021). Army Wynn, who was evaluated through a synchronous (real-time) audio-video encounter, and/or her healthcare decision maker, is aware that it is a billable service, with coverage as determined by her insurance carrier. She provided verbal consent to proceed: Yes, and patient identification was verified. This visit was conducted pursuant to the emergency declaration under the [address] [place] [address], [address] [place] [address] authority and the [employer] Act. A caregiver was present when appropriate. Ability to conduct physical exam was limited. The patient was located in a state where the provider was credentialed to provide care.      --Lukas Fernandez MD on 3/25/2021 at 10:52 AM        Lukas Fernandez MD  Medical Oncologist   [facility]

## 2021-03-29 DIAGNOSIS — D50.9 MICROCYTIC ANEMIA: ICD-10-CM

## 2021-03-31 ENCOUNTER — TELEPHONE (OUTPATIENT)
Dept: ONCOLOGY | Age: 61
End: 2021-03-31

## 2021-03-31 DIAGNOSIS — D47.2 MONOCLONAL GAMMOPATHY: Primary | ICD-10-CM

## 2021-03-31 NOTE — TELEPHONE ENCOUNTER
Patient was contacted regarding finding of IgA lambda monoclonal protein on SPEP.  I briefly discussed these results with her and asked that she have additional blood studies done as soon as is convenient for her.      Will order repeat SPEP/IFIX/FLC as well as ionized calcium today.  24 hour urine with UPEP also ordered.      Patient in agreement with plan.  Will plan for closer follow up after results of these labs.

## 2021-04-01 ENCOUNTER — TELEPHONE (OUTPATIENT)
Dept: ONCOLOGY | Age: 61
End: 2021-04-01

## 2021-04-01 NOTE — TELEPHONE ENCOUNTER
Received a call from the patient and verified ID x 2. The patient stated that she went to the draw site at [address] [place] [address] and was given the 24 hour urine collection kit and asked if a blood draw was also needed and was told a blood draw is not needed. Informed the patient that Dr. Jessica Perez did order blood draw labs. The patient stated that she will drop off the urine sample on Monday 4/5/21 when she has her iron infusion. Informed the patient that the labs can be drawn at her infusion appointment. The patient verbalized understanding and denied any questions or concerns. Shakira Funes NP made aware and addended the patient's order to include lab draw orders.

## 2021-04-05 ENCOUNTER — HOSPITAL ENCOUNTER (OUTPATIENT)
Dept: INFUSION THERAPY | Age: 61
Discharge: HOME | End: 2021-04-05
Payer: COMMERCIAL

## 2021-04-05 VITALS
TEMPERATURE: 97.2 F | SYSTOLIC BLOOD PRESSURE: 130 MMHG | RESPIRATION RATE: 15 BRPM | DIASTOLIC BLOOD PRESSURE: 70 MMHG | OXYGEN SATURATION: 99 % | HEART RATE: 60 BPM

## 2021-04-05 DIAGNOSIS — D50.9 MICROCYTIC ANEMIA: ICD-10-CM

## 2021-04-05 DIAGNOSIS — D50.9 MICROCYTIC ANEMIA: Primary | ICD-10-CM

## 2021-04-05 LAB
IGA SERPL-MCNC: 217 MG/DL (ref 70–400)
IGG SERPL-MCNC: 913 MG/DL (ref 700–1600)
IGM SERPL-MCNC: 88 MG/DL (ref 40–230)

## 2021-04-05 PROCEDURE — 82784 ASSAY IGA/IGD/IGG/IGM EACH: CPT

## 2021-04-05 PROCEDURE — 83883 ASSAY NEPHELOMETRY NOT SPEC: CPT

## 2021-04-05 PROCEDURE — 74011250636 HC RX REV CODE- 250/636: Performed by: STUDENT IN AN ORGANIZED HEALTH CARE EDUCATION/TRAINING PROGRAM

## 2021-04-05 PROCEDURE — 96365 THER/PROPH/DIAG IV INF INIT: CPT

## 2021-04-05 PROCEDURE — 36415 COLL VENOUS BLD VENIPUNCTURE: CPT

## 2021-04-05 RX ORDER — SODIUM CHLORIDE 9 MG/ML
25 INJECTION, SOLUTION INTRAVENOUS CONTINUOUS
Status: DISPENSED | OUTPATIENT
Start: 2021-04-05 | End: 2021-04-05

## 2021-04-05 RX ORDER — DIPHENHYDRAMINE HYDROCHLORIDE 50 MG/ML
25 INJECTION, SOLUTION INTRAMUSCULAR; INTRAVENOUS AS NEEDED
Status: CANCELLED
Start: 2021-04-12

## 2021-04-05 RX ORDER — EPINEPHRINE 1 MG/ML
0.3 INJECTION, SOLUTION, CONCENTRATE INTRAVENOUS AS NEEDED
Status: CANCELLED | OUTPATIENT
Start: 2021-04-12

## 2021-04-05 RX ORDER — SODIUM CHLORIDE 0.9 % (FLUSH) 0.9 %
10 SYRINGE (ML) INJECTION AS NEEDED
Status: CANCELLED | OUTPATIENT
Start: 2021-04-12

## 2021-04-05 RX ORDER — ACETAMINOPHEN 325 MG/1
650 TABLET ORAL AS NEEDED
Status: CANCELLED
Start: 2021-04-12

## 2021-04-05 RX ORDER — HYDROCORTISONE SODIUM SUCCINATE 100 MG/2ML
100 INJECTION, POWDER, FOR SOLUTION INTRAMUSCULAR; INTRAVENOUS AS NEEDED
Status: CANCELLED | OUTPATIENT
Start: 2021-04-12

## 2021-04-05 RX ORDER — HEPARIN 100 UNIT/ML
300-500 SYRINGE INTRAVENOUS AS NEEDED
Status: CANCELLED
Start: 2021-04-12

## 2021-04-05 RX ORDER — SODIUM CHLORIDE 9 MG/ML
10 INJECTION INTRAMUSCULAR; INTRAVENOUS; SUBCUTANEOUS AS NEEDED
Status: CANCELLED | OUTPATIENT
Start: 2021-04-12

## 2021-04-05 RX ORDER — ALBUTEROL SULFATE 0.83 MG/ML
2.5 SOLUTION RESPIRATORY (INHALATION) AS NEEDED
Status: CANCELLED
Start: 2021-04-12

## 2021-04-05 RX ORDER — ONDANSETRON 2 MG/ML
8 INJECTION INTRAMUSCULAR; INTRAVENOUS AS NEEDED
Status: CANCELLED | OUTPATIENT
Start: 2021-04-12

## 2021-04-05 RX ORDER — SODIUM CHLORIDE 9 MG/ML
25 INJECTION, SOLUTION INTRAVENOUS CONTINUOUS
Status: CANCELLED | OUTPATIENT
Start: 2021-04-12

## 2021-04-05 RX ORDER — DIPHENHYDRAMINE HYDROCHLORIDE 50 MG/ML
50 INJECTION, SOLUTION INTRAMUSCULAR; INTRAVENOUS AS NEEDED
Status: CANCELLED
Start: 2021-04-12

## 2021-04-05 RX ADMIN — FERRIC CARBOXYMALTOSE INJECTION 750 MG: 50 INJECTION, SOLUTION INTRAVENOUS at 10:35

## 2021-04-05 RX ADMIN — SODIUM CHLORIDE 25 ML/HR: 9 INJECTION, SOLUTION INTRAVENOUS at 10:23

## 2021-04-05 NOTE — PROGRESS NOTES
[facility] Progress Note    Date: 2021    Name: Zahida Koroma    MRN: [id number]         : 1960    Ms. Frankey Ehlers Arrived ambulatory and in no distress for 1/2 Injectafer Regimen. Assessment was completed, no acute issues at this time, no new complaints voiced. PIV placed without difficulty, labs drawn & sent for processing. Covid questionnaire completed. 1. Do you have any symptoms of COVID-19? SOB, coughing, fever, or generally not feeling well - no    2. Have you been exposed to COVID-19 recently? - no    3. Have you had any recent contact with family/friend that has a pending COVID test? - no      Ms. Roe's vitals were reviewed. Visit Vitals  BP (!) 150/67   Pulse 71   Temp (!) 96.3 °F (35.7 °C)   Resp 18   SpO2 99%   Breastfeeding No       Labs were obtained and pending, please see [facility]. Medications:  Injectafer IV    Pt politely declined being monitored after infusion. Ms. Frankey Ehlers tolerated treatment well and was discharged from [place] [address] in stable condition. PIV flushed and removed. She is to return on 21 for her next appointment.     Arianna Du RN  2021

## 2021-04-06 ENCOUNTER — TELEPHONE (OUTPATIENT)
Dept: ONCOLOGY | Age: 61
End: 2021-04-06

## 2021-04-06 LAB
IGA SERPL-MCNC: 215 MG/DL (ref 87–352)
IGG SERPL-MCNC: 833 MG/DL (ref 586–1602)
IGM SERPL-MCNC: 84 MG/DL (ref 26–217)
KAPPA LC FREE SER-MCNC: 17.7 MG/L (ref 3.3–19.4)
KAPPA LC FREE/LAMBDA FREE SER: 1.11 {RATIO} (ref 0.26–1.65)
LAMBDA LC FREE SERPL-MCNC: 15.9 MG/L (ref 5.7–26.3)
PROT PATTERN SERPL IFE-IMP: ABNORMAL

## 2021-04-06 NOTE — TELEPHONE ENCOUNTER
Pt called and left VM on nurses line asking for a call back regarding lab work she had done. She has mychart and saw the results and needs clarification.

## 2021-04-06 NOTE — TELEPHONE ENCOUNTER
I called the patient back to review her partial lab results. She is very anxious and concerned about the finding of the abnormal IgA paraprotein. For now, SPEP with immunofixation is still pending as is her urine immunofixation. Free light chains are also still pending. I discussed the difference between MGUS and multiple myeloma with the patient, for now additional information is required in order to appropriately administer diagnosis. The patient's questions were answered and she sounded less anxious at the end of our call. She was asked to call with any additional questions or concerns.

## 2021-04-08 ENCOUNTER — TELEPHONE (OUTPATIENT)
Dept: ONCOLOGY | Age: 61
End: 2021-04-08

## 2021-04-08 LAB
ALBUMIN SERPL ELPH-MCNC: 3.6 G/DL (ref 2.9–4.4)
ALBUMIN/GLOB SERPL: 1.3 {RATIO} (ref 0.7–1.7)
ALPHA1 GLOB SERPL ELPH-MCNC: 0.3 G/DL (ref 0–0.4)
ALPHA2 GLOB SERPL ELPH-MCNC: 0.6 G/DL (ref 0.4–1)
B-GLOBULIN SERPL ELPH-MCNC: 1.1 G/DL (ref 0.7–1.3)
GAMMA GLOB SERPL ELPH-MCNC: 0.9 G/DL (ref 0.4–1.8)
GLOBULIN SER-MCNC: 3 G/DL (ref 2.2–3.9)
IGA SERPL-MCNC: 202 MG/DL (ref 87–352)
IGG SERPL-MCNC: 876 MG/DL (ref 586–1602)
IGM SERPL-MCNC: 77 MG/DL (ref 26–217)
INTERPRETATION SERPL IEP-IMP: ABNORMAL
KAPPA LC FREE SER-MCNC: 17.3 MG/L (ref 3.3–19.4)
KAPPA LC FREE/LAMBDA FREE SER: 1.17 {RATIO} (ref 0.26–1.65)
LAMBDA LC FREE SERPL-MCNC: 14.8 MG/L (ref 5.7–26.3)
M PROTEIN SERPL ELPH-MCNC: 0.5 G/DL
PROT SERPL-MCNC: 6.6 G/DL (ref 6–8.5)

## 2021-04-08 NOTE — TELEPHONE ENCOUNTER
Patient is calling re getting results for her Urine test. Stated she called yesterday but has not heard back.

## 2021-04-08 NOTE — TELEPHONE ENCOUNTER
Patient was contacted via phone. We reviewed her SPEP/UPEP/free light chains. The patient has a barely detectable IgA lambda paraprotein on immunofixation that is otherwise unquantifiable. No abnormal protein in the urine on 24-hour urine electrophoresis. I discussed that this is likely MGUS and that the patient has low risk of progression to any kind of malignancy. I discussed that we would review MGUS further on her next visit. The patient should continue with IV iron infusions.

## 2021-04-12 ENCOUNTER — HOSPITAL ENCOUNTER (OUTPATIENT)
Dept: INFUSION THERAPY | Age: 61
Discharge: HOME | End: 2021-04-12
Payer: COMMERCIAL

## 2021-04-12 VITALS
OXYGEN SATURATION: 99 % | RESPIRATION RATE: 16 BRPM | HEART RATE: 58 BPM | TEMPERATURE: 97 F | DIASTOLIC BLOOD PRESSURE: 76 MMHG | SYSTOLIC BLOOD PRESSURE: 178 MMHG

## 2021-04-12 DIAGNOSIS — D50.9 MICROCYTIC ANEMIA: Primary | ICD-10-CM

## 2021-04-12 PROCEDURE — 74011250636 HC RX REV CODE- 250/636: Performed by: STUDENT IN AN ORGANIZED HEALTH CARE EDUCATION/TRAINING PROGRAM

## 2021-04-12 PROCEDURE — 74011000258 HC RX REV CODE- 258: Performed by: STUDENT IN AN ORGANIZED HEALTH CARE EDUCATION/TRAINING PROGRAM

## 2021-04-12 PROCEDURE — 96365 THER/PROPH/DIAG IV INF INIT: CPT

## 2021-04-12 RX ORDER — DIPHENHYDRAMINE HYDROCHLORIDE 50 MG/ML
25 INJECTION, SOLUTION INTRAMUSCULAR; INTRAVENOUS AS NEEDED
Status: CANCELLED
Start: 2021-04-12

## 2021-04-12 RX ORDER — HYDROCORTISONE SODIUM SUCCINATE 100 MG/2ML
100 INJECTION, POWDER, FOR SOLUTION INTRAMUSCULAR; INTRAVENOUS AS NEEDED
Status: CANCELLED | OUTPATIENT
Start: 2021-04-12

## 2021-04-12 RX ORDER — ONDANSETRON 2 MG/ML
8 INJECTION INTRAMUSCULAR; INTRAVENOUS AS NEEDED
Status: CANCELLED | OUTPATIENT
Start: 2021-04-12

## 2021-04-12 RX ORDER — HEPARIN 100 UNIT/ML
300-500 SYRINGE INTRAVENOUS AS NEEDED
Status: DISCONTINUED | OUTPATIENT
Start: 2021-04-12 | End: 2021-04-13 | Stop reason: HOSPADM

## 2021-04-12 RX ORDER — SODIUM CHLORIDE 9 MG/ML
10 INJECTION INTRAMUSCULAR; INTRAVENOUS; SUBCUTANEOUS AS NEEDED
Status: CANCELLED | OUTPATIENT
Start: 2021-04-12

## 2021-04-12 RX ORDER — SODIUM CHLORIDE 9 MG/ML
25 INJECTION, SOLUTION INTRAVENOUS CONTINUOUS
Status: CANCELLED | OUTPATIENT
Start: 2021-04-12

## 2021-04-12 RX ORDER — HEPARIN 100 UNIT/ML
300-500 SYRINGE INTRAVENOUS AS NEEDED
Status: CANCELLED
Start: 2021-04-12

## 2021-04-12 RX ORDER — SODIUM CHLORIDE 9 MG/ML
25 INJECTION, SOLUTION INTRAVENOUS CONTINUOUS
Status: DISCONTINUED | OUTPATIENT
Start: 2021-04-12 | End: 2021-04-13 | Stop reason: HOSPADM

## 2021-04-12 RX ORDER — ACETAMINOPHEN 325 MG/1
650 TABLET ORAL AS NEEDED
Status: CANCELLED
Start: 2021-04-12

## 2021-04-12 RX ORDER — ALBUTEROL SULFATE 0.83 MG/ML
2.5 SOLUTION RESPIRATORY (INHALATION) AS NEEDED
Status: CANCELLED
Start: 2021-04-12

## 2021-04-12 RX ORDER — SODIUM CHLORIDE 0.9 % (FLUSH) 0.9 %
10 SYRINGE (ML) INJECTION AS NEEDED
Status: DISCONTINUED | OUTPATIENT
Start: 2021-04-12 | End: 2021-04-13 | Stop reason: HOSPADM

## 2021-04-12 RX ORDER — EPINEPHRINE 1 MG/ML
0.3 INJECTION, SOLUTION, CONCENTRATE INTRAVENOUS AS NEEDED
Status: CANCELLED | OUTPATIENT
Start: 2021-04-12

## 2021-04-12 RX ORDER — DIPHENHYDRAMINE HYDROCHLORIDE 50 MG/ML
50 INJECTION, SOLUTION INTRAMUSCULAR; INTRAVENOUS AS NEEDED
Status: CANCELLED
Start: 2021-04-12

## 2021-04-12 RX ORDER — SODIUM CHLORIDE 9 MG/ML
10 INJECTION INTRAMUSCULAR; INTRAVENOUS; SUBCUTANEOUS AS NEEDED
Status: DISCONTINUED | OUTPATIENT
Start: 2021-04-12 | End: 2021-04-13 | Stop reason: HOSPADM

## 2021-04-12 RX ORDER — SODIUM CHLORIDE 0.9 % (FLUSH) 0.9 %
10 SYRINGE (ML) INJECTION AS NEEDED
Status: CANCELLED | OUTPATIENT
Start: 2021-04-12

## 2021-04-12 RX ADMIN — FERRIC CARBOXYMALTOSE INJECTION 750 MG: 50 INJECTION, SOLUTION INTRAVENOUS at 14:16

## 2021-04-12 RX ADMIN — SODIUM CHLORIDE 25 ML/HR: 900 INJECTION, SOLUTION INTRAVENOUS at 14:10

## 2021-04-12 NOTE — PROGRESS NOTES
Outpatient Infusion Center Short Visit Progress Note     Patient admitted to [facility] for MS [facility] ambulatory in stable condition. Assessment completed. No new concerns voiced. Covid Screening      1. Do you have any symptoms of COVID-19? SOB, coughing, fever, or generally not feeling well ? NO  2. Have you been exposed to COVID-19 recently? YES  3. Have you had any recent contact with family/friend that has a pending COVID test? NO    Vital Signs:  Visit Vitals  BP (!) 143/65   Pulse (!) 59   Temp 97 °F (36.1 °C)   Resp 16   Breastfeeding No         Right AC PIV #24 with positive blood return. Medications:  Medications Administered     0.9% sodium chloride infusion     Admin Date  04/12/2021 Action  New Bag Dose  25 mL/hr Rate  25 mL/hr Route  IntraVENous Administered By  Chun Lay RN          ferric carboxymaltose (INJECTAFER) 750 mg in 0.9% sodium chloride 250 mL, overfill volume 25 mL IVPB     Admin Date  04/12/2021 Action  Given Dose  750 mg Rate  870 mL/hr Route  IntraVENous Administered By  Chun Lay RN                     Patient tolerated treatment well. IV discontinued, pressure dressing applied. Patient discharged from [place] [address] ambulatory in no distress. Patient aware of next appointment.     Future Appointments   Date Time Provider Neena Feldman   5/5/2021 11:00 AM Mesha Hernandez MD [facility] AMB   5/25/2021 11:00 AM Sully Flores MD [facility] BS AMB

## 2021-05-25 ENCOUNTER — VIRTUAL VISIT (OUTPATIENT)
Dept: ONCOLOGY | Age: 61
End: 2021-05-25
Payer: COMMERCIAL

## 2021-05-25 DIAGNOSIS — D50.9 MICROCYTIC ANEMIA: Primary | ICD-10-CM

## 2021-05-25 PROCEDURE — 99214 OFFICE O/P EST MOD 30 MIN: CPT | Performed by: STUDENT IN AN ORGANIZED HEALTH CARE EDUCATION/TRAINING PROGRAM

## 2021-05-25 NOTE — PROGRESS NOTES
Mary Perez is a 63 y.o. female  HIPAA verified by two patient identifiers. Chief Complaint   Patient presents with    Anemia     MGUS ,injectofer 8 week follow up     . Patient-Reported Vitals 5/25/2021   Patient-Reported Weight 156lbb   Patient-Reported Height -   Patient-Reported Systolic  -   Patient-Reported Diastolic -       Pain Scale: 0/10  Pain Location:      .1. Have you been to the ER, urgent care clinic since your last visit? Hospitalized since your last visit? No    2. Have you seen or consulted any other health care providers outside of the [address] [place] [address] since your last visit? Include any pap smears or colon screening.  No

## 2021-05-27 NOTE — PROGRESS NOTES
[facility] [place] HEMATOLOGY/ONCOLOGY CLINIC NOTE    ID: Aurora Han is a 63 y.o. female    PCP: Heather Siegel NP    Referral Requested by: Heather Siegel NP    Chief Complaint/Reason for Referral: Microcytic Anemia , MGUS  Chief Complaint   Patient presents with    Anemia     MGUS ,injectofer 8 week follow up         HISTORY OF PRESENT ILLNESS:    Diagnosis:  1) Microcytic Anemia     2) MGUS       Date of Diagnosis: 4/2016    Current Treatment: Oral Iron BID      Hematologic/Oncologic History:     Briefly this is a very pleasant 21-year-old -American female with a past medical history most notable for peptic ulcer disease, GERD, anxiety/depression, hypothyroidism, asthma, and a longstanding history of iron deficiency anemia for which the patient had been taking oral iron intermittently. On chart review, the patient is seen to have had evidence of iron deficiency with microcytic anemia that was worsening since September 2018. The patient reports that she has a distant history of peptic ulcer disease but is currently having symptoms that are consistent with past episodes of gastric ulcers. She describes aching pain at night that wakes her from sleep in the stomach that is relieved by acid reflux medications. The patient does take Protonix daily, and reports that she is requiring this medicine in order to maintain management of her GERD and stomach discomfort. The patient has been taking oral iron supplementation as prescribed, however she has been having significant difficulty with toleration as the medication causes constipation and nausea. The patient has tried MiraLAX to relieve some of her constipation symptoms of this continues to be problematic for her. Family history reviewed, significant for breast cancer in her sister age 61. No other known cancer history in the family. The patient denies any family history of thalassemia or other blood disorder.     Social history reviewed, significant for only tobacco use, half a pack per day for the last 30 years. No reported illicit drug use. Interval History:     The patient denies any new clinical changes. She reports marked improvement of fatigue, interval resolution of abdominal pain. She continues to have some constipation which limits her ability to tolerate oral iron. Since last encounter the patient had IV iron infusion on 04/05/2021 as well as 04/12/2021. Repeat labs from after iron infusion were not available prior to patient encounter. No other new clinical worsening or symptoms. ECOG Performance Status: (0) Fully active, able to carry on all predisease performance without restriction      ALLERGIES:  No Known Allergies    MEDICATIONS:  Current Outpatient Medications on File Prior to Visit   Medication Sig Dispense Refill    methocarbamoL (ROBAXIN) 500 mg tablet Take 1 Tab by mouth three (3) times daily as needed for Muscle Spasm(s). 60 Tab 1    gabapentin (NEURONTIN) 100 mg capsule Take 1 Cap by mouth two (2) times a day. Max Daily Amount: 200 mg. 60 Cap 2    meloxicam (MOBIC) 7.5 mg tablet Take 1 Tab by mouth daily. 30 Tab 5    ALPRAZolam (XANAX) 0.5 mg tablet TAKE 1 TABLET BY MOUTH TWICE DAILY as needed for anxiety 180 Tab 0    pantoprazole (PROTONIX) 40 mg tablet TAKE 1 TABLET DAILY by mouth 90 Tab 4    lisinopril-hydroCHLOROthiazide (PRINZIDE, ZESTORETIC) 20-25 mg per tablet Take 1 Tab by mouth daily. 30 Tab 2    aspirin/salicylamide/caffeine (BC HEADACHE POWDER PO) Take 1 Package by mouth.  azelastine (ASTELIN) 137 mcg (0.1 %) nasal spray 1 Spray as needed for Rhinitis. Use in each nostril as directed      ferrous sulfate (SLOW FE) 142 mg (45 mg iron) ER tablet Take 1 Tab by mouth two (2) times a day. (Patient not taking: Reported on 5/25/2021) 60 Tab 5    cromolyn (OPTICROM) 4 % ophthalmic solution Administer 1 Drop to both eyes four (4) times daily.  Use in affected eye(s) (Patient not taking: Reported on 5/25/2021) 10 mL 0     No current facility-administered medications on file prior to visit. PMH:  Past Medical History:   Diagnosis Date    Chronic pain     Hypertension     Microcytic anemia 3/25/2021    Non-nicotine vapor product user     Psychiatric disorder     anxiety     Past Surgical History:   Procedure Laterality Date    HC LAP BAND ADJUST PROCEDURE      HX BREAST BIOPSY Left     years ago    HX BREAST BIOPSY Right 06/2018    Benign    HX CERVICAL FUSION      HX CHOLECYSTECTOMY      HX GASTRIC BYPASS      HX HYSTERECTOMY      HX ORTHOPAEDIC      foot surgery--bunions--bilateral    HX ORTHOPAEDIC      ACDF    NC REMOVAL GALLBLADDER       Patient Active Problem List   Diagnosis Code    HTN (hypertension) I10    Anxiety F41.9    GERD without esophagitis K21.9    Spondylolisthesis of lumbar region M43.16    S/P lumbar spinal fusion Z98.1    Microcytic anemia D50.9    Monoclonal gammopathy D47.2       SOCIAL AND FAMILY HISTORY:  Social History     Socioeconomic History    Marital status:      Spouse name: Not on file    Number of children: Not on file    Years of education: Not on file    Highest education level: Not on file   Tobacco Use    Smoking status: Current Every Day Smoker     Packs/day: 0.50     Years: 25.00     Pack years: 12.50     Types: Cigarettes    Smokeless tobacco: Never Used   Substance and Sexual Activity    Alcohol use: Yes     Alcohol/week: 0.0 standard drinks     Comment: rare/1-2 per month    Drug use: No    Sexual activity: Yes     Partners: Male     Birth control/protection: None     Social Determinants of Health     Financial Resource Strain:     Difficulty of Paying Living Expenses:    Food Insecurity:     Worried About Running Out of Food in the Last Year:     [address] in the Last Year:    Transportation Needs:     Lack of Transportation (Medical):      Lack of Transportation (Non-Medical):    Physical Activity:     Days of Exercise per Week:     Minutes of Exercise per Session:    Stress:     Feeling of Stress :    Social Connections:     Frequency of Communication with Friends and Family:     Frequency of Social Gatherings with Friends and Family:     Attends [employer]oration[employer] Services:     Active Member of Clubs or Organizations:     Attends Club or Organization Meetings:     Marital Status:      family history includes Breast Cancer in her paternal aunt; Breast Cancer (age of onset: 72) in her sister; Diabetes in her father and mother; Heart Disease in her father and mother; Other in her sister. REVIEW OF SYSTEMS:   Review of Systems   Constitutional: Negative for fever and weight loss. HENT: Negative. Negative for nosebleeds. Eyes: Negative. Respiratory: Negative. Negative for cough, hemoptysis and shortness of breath. Cardiovascular: Negative. Negative for chest pain and leg swelling. Gastrointestinal: Negative for abdominal pain, blood in stool, constipation, diarrhea, heartburn, melena, nausea and vomiting. Genitourinary: Negative. Negative for hematuria. Musculoskeletal: Negative. Negative for falls and myalgias. Skin: Negative. Negative for rash. Neurological: Negative. Endo/Heme/Allergies: Negative. PHYSICAL EXAMINATION:  There were no vitals filed for this visit. Physical Exam  Objective:   Vital Signs: (As obtained by patient/caregiver at home)  There were no vitals taken for this visit.      [INSTRUCTIONS:  \"[x]\" Indicates a positive item  \"[]\" Indicates a negative item  -- DELETE ALL ITEMS NOT EXAMINED]    Constitutional: [x] Appears well-developed and well-nourished [x] No apparent distress      [] Abnormal -     Mental status: [x] Alert and awake  [x] Oriented to person/place/time [x] Able to follow commands    [] Abnormal -     Eyes:   EOM    [x]  Normal    [] Abnormal -   Sclera  [x]  Normal    [] Abnormal -          Discharge [x]  None visible   [] Abnormal - HENT: [x] Normocephalic, atraumatic  [] Abnormal -   [x] Mouth/Throat: Mucous membranes are moist    External Ears [x] Normal  [] Abnormal -    Neck: [x] No visualized mass [] Abnormal -     Pulmonary/Chest: [x] Respiratory effort normal   [x] No visualized signs of difficulty breathing or respiratory distress        [] Abnormal -      Musculoskeletal:   [x] Normal gait with no signs of ataxia         [x] Normal range of motion of neck        [] Abnormal -     Neurological:        [x] No Facial Asymmetry (Cranial nerve 7 motor function) (limited exam due to video visit)          [x] No gaze palsy        [] Abnormal -          Skin:        [x] No significant exanthematous lesions or discoloration noted on facial skin         [] Abnormal -            Psychiatric:       [x] Normal Affect [] Abnormal -        [x] No Hallucinations    Other pertinent observable physical exam findings:-      DATA REVIEW:     Lab Results   Component Value Date    HGB 13.7 05/25/2021    HCT 44.7 05/25/2021    WBC 4.9 05/25/2021     05/25/2021       No components found for: NEUTOPHILPCT, LYMPHOPCT, MONOPCT, EOSPCT  Lab Results   Component Value Date     05/25/2021    K 3.9 05/25/2021     (H) 05/25/2021    BUN 13 05/25/2021     Lab Results   Component Value Date    AST 12 (L) 05/25/2021    ALT 13 05/25/2021    TBILI 0.2 05/25/2021     Chart from patient's PCP reviewed lab values to detailed in HPI. Most recent iron saturation of only 3%, elevated TIBC, no ferritin was ordered. ASSESSMENT & PLAN:    78-year-old female with a past medical history notable for gastroesophageal reflux disorder, history of gastric ulcers, anxiety, history of spinal stenosis, presents for evaluation of treatment of chronic microcytic anemia that has been refractory to oral iron supplementation. She received IV iron with Injectafer x2.  She presents today for follow-up    - # Microcytic Anemia of secondary to iron deficiency anemia  - Unclear cause of iron loss. - Would advise to obtain gastroenterology consult to rule out peptic ulcer disease as well as evaluate for any blood in stool.  - She has had good response to IV iron therapy. Hg improved from 7.7 g/dl to 13.7 g/dl    - # Monoclonal Gammopathy of unknown significance   - 0.5 g/dl m-spike. IgA lambda light chain.   - 24 hour urine shows no paraprotein. - No other clinical signs or findings of myeloma   - follow up in 3 months to trend change    Return visit:    Follow-up and Dispositions    · Return in about 3 months (around 8/25/2021).    Follow-up and Disposition History          Rula Perez MD  Medical Oncologist   [facility]

## 2021-06-01 ENCOUNTER — TELEPHONE (OUTPATIENT)
Dept: FAMILY MEDICINE CLINIC | Age: 61
End: 2021-06-01

## 2021-06-01 NOTE — TELEPHONE ENCOUNTER
Spoke with pt she states that she saw on her my-chart that she needed to have a colonoscopy done.  Gave name and number to dr Sharmila Medeiros

## 2021-06-01 NOTE — TELEPHONE ENCOUNTER
Patient would like to discuss several tests that she would like to have done.  Patient's phone: [phone]

## 2021-07-15 ENCOUNTER — DOCUMENTATION ONLY (OUTPATIENT)
Dept: FAMILY MEDICINE CLINIC | Age: 61
End: 2021-07-15

## 2021-07-15 NOTE — PROGRESS NOTES
Received faxed medical record request from [facility] for office note and labs. Faxed to [phone] with confirmation received.

## 2021-07-21 ENCOUNTER — VIRTUAL VISIT (OUTPATIENT)
Dept: FAMILY MEDICINE CLINIC | Age: 61
End: 2021-07-21
Payer: COMMERCIAL

## 2021-07-21 DIAGNOSIS — G44.221 CHRONIC TENSION-TYPE HEADACHE, INTRACTABLE: ICD-10-CM

## 2021-07-21 DIAGNOSIS — K21.01 GASTROESOPHAGEAL REFLUX DISEASE WITH ESOPHAGITIS AND HEMORRHAGE: ICD-10-CM

## 2021-07-21 DIAGNOSIS — F41.9 ANXIETY: Primary | ICD-10-CM

## 2021-07-21 PROCEDURE — 99214 OFFICE O/P EST MOD 30 MIN: CPT | Performed by: NURSE PRACTITIONER

## 2021-07-21 RX ORDER — TRAMADOL HYDROCHLORIDE 50 MG/1
50 TABLET ORAL
Qty: 30 TABLET | Refills: 0 | Status: SHIPPED | OUTPATIENT
Start: 2021-07-21 | End: 2021-08-20

## 2021-07-21 RX ORDER — METHOCARBAMOL 500 MG/1
500 TABLET, FILM COATED ORAL
Qty: 30 TABLET | Refills: 2 | Status: SHIPPED | OUTPATIENT
Start: 2021-07-21 | End: 2022-07-25 | Stop reason: ALTCHOICE

## 2021-07-21 RX ORDER — ALPRAZOLAM 0.5 MG/1
TABLET ORAL
Qty: 180 TABLET | Refills: 0 | Status: SHIPPED | OUTPATIENT
Start: 2021-07-21 | End: 2022-01-26 | Stop reason: SDUPTHER

## 2021-07-21 NOTE — PROGRESS NOTES
Mani Bardales (: 1960) is a 63 y.o. female, established patient, here for evaluation of the following chief complaint(s):   Medication Refill       ASSESSMENT/PLAN:  Below is the assessment and plan developed based on review of pertinent labs, studies, and medications. Diagnoses and all orders for this visit:    1. Anxiety  -     ALPRAZolam (XANAX) 0.5 mg tablet; TAKE 1 TABLET BY MOUTH TWICE DAILY as needed for anxiety    2. Chronic tension-type headache, intractable  -     traMADoL (ULTRAM) 50 mg tablet; Take 1 Tablet by mouth every eight (8) hours as needed for Pain for up to 30 days. Max Daily Amount: 150 mg.    3. Gastroesophageal reflux disease with esophagitis and hemorrhage    Other orders  -     methocarbamoL (ROBAXIN) 500 mg tablet; Take 1 Tablet by mouth nightly. For tension headaches        Refilled xanax, must use sparingly  Given tramadol for headache to use very sparingly and refilled the Robaxin to take at night for tension headaches  Must stop all nsaids and the mobic on file due to her esophagitis  Follow up Friday with GI for work up as planned      No follow-ups on file.     SUBJECTIVE/OBJECTIVE:  HPI  She had upper endoscopy last week  Dx with Grade 3 erosive esophagitis and ulcer  Has another scope with camera on Friday  Advised to stop all nsaids and see pcp for alternative meds for migraines  They are coming every day due to her stress in the last few weeks    Review of Systems   A comprehensive review of system was obtained and negative except findings in the HPI    Patient-Reported Vitals 2021   Patient-Reported Weight 160lb   Patient-Reported Height -   Patient-Reported Pulse 74   Patient-Reported Temperature 97.4   Patient-Reported SpO2 100   Patient-Reported Systolic  964   Patient-Reported Diastolic 89       Physical Exam    [INSTRUCTIONS:  \"[x]\" Indicates a positive item  \"[]\" Indicates a negative item  -- DELETE ALL ITEMS NOT EXAMINED]    Constitutional: [x] Appears well-developed and well-nourished [x] No apparent distress      [] Abnormal -     Mental status: [x] Alert and awake  [x] Oriented to person/place/time [x] Able to follow commands    [] Abnormal -     Eyes:   EOM    [x]  Normal    [] Abnormal -   Sclera  [x]  Normal    [] Abnormal -          Discharge [x]  None visible   [] Abnormal -     HENT: [x] Normocephalic, atraumatic  [] Abnormal -   [x] Mouth/Throat: Mucous membranes are moist    External Ears [x] Normal  [] Abnormal -    Neck: [x] No visualized mass [] Abnormal -     Pulmonary/Chest: [x] Respiratory effort normal   [x] No visualized signs of difficulty breathing or respiratory distress        [] Abnormal -      Musculoskeletal:   [x] Normal gait with no signs of ataxia         [x] Normal range of motion of neck        [] Abnormal -     Neurological:        [x] No Facial Asymmetry (Cranial nerve 7 motor function) (limited exam due to video visit)          [x] No gaze palsy        [] Abnormal -          Skin:        [x] No significant exanthematous lesions or discoloration noted on facial skin         [] Abnormal -            Psychiatric:       [x] Normal Affect [] Abnormal -        [x] No Hallucinations    Other pertinent observable physical exam findings:- none          On this date 07/21/2021 I have spent 15 minutes reviewing previous notes, test results and face to face (virtual) with the patient discussing the diagnosis and importance of compliance with the treatment plan as well as documenting on the day of the visit. Jose Solis, was evaluated through a synchronous (real-time) audio-video encounter. The patient (or guardian if applicable) is aware that this is a billable service. Verbal consent to proceed has been obtained within the past 12 months.  The visit was conducted pursuant to the emergency declaration under the [facility] waiver authority and the [employer] and [employer] Supplemental Appropriations Act. Patient identification was verified, and a caregiver was present when appropriate. The patient was located in a state where the provider was credentialed to provide care. An electronic signature was used to authenticate this note.   -- Monserrat Thomas NP

## 2021-07-21 NOTE — PROGRESS NOTES
Chief Complaint   Patient presents with    Medication Refill     Pt being seen for vv to have her medication refilled  -pt states she want's to discuss her colonoscopy with provider  Pt states she wants to discuss what else she can take besides the [facility] powder to help with her headaches     1. Have you been to the ER, urgent care clinic since your last visit? Hospitalized since your last visit? No    2. Have you seen or consulted any other health care providers outside of the [address] [place] [address] since your last visit? Include any pap smears or colon screening.  gastro

## 2021-07-28 ENCOUNTER — HOSPITAL ENCOUNTER (OUTPATIENT)
Dept: GENERAL RADIOLOGY | Age: 61
Discharge: HOME | End: 2021-07-28
Attending: INTERNAL MEDICINE
Payer: COMMERCIAL

## 2021-07-28 ENCOUNTER — TRANSCRIBE ORDER (OUTPATIENT)
Dept: GENERAL RADIOLOGY | Age: 61
End: 2021-07-28

## 2021-07-28 DIAGNOSIS — T18.9XXA INGESTION OF FOREIGN BODY: Primary | ICD-10-CM

## 2021-07-28 DIAGNOSIS — T18.9XXA INGESTION OF FOREIGN BODY: ICD-10-CM

## 2021-07-28 PROCEDURE — 74018 RADEX ABDOMEN 1 VIEW: CPT

## 2021-07-29 ENCOUNTER — HOSPITAL ENCOUNTER (OUTPATIENT)
Dept: GENERAL RADIOLOGY | Age: 61
Discharge: HOME | End: 2021-07-29
Attending: INTERNAL MEDICINE
Payer: COMMERCIAL

## 2021-07-29 ENCOUNTER — TRANSCRIBE ORDER (OUTPATIENT)
Dept: GENERAL RADIOLOGY | Age: 61
End: 2021-07-29

## 2021-07-29 DIAGNOSIS — T18.9XXA INGESTION OF FOREIGN BODY: Primary | ICD-10-CM

## 2021-07-29 DIAGNOSIS — T18.9XXA INGESTION OF FOREIGN BODY: ICD-10-CM

## 2021-07-29 PROCEDURE — 74018 RADEX ABDOMEN 1 VIEW: CPT

## 2021-08-02 ENCOUNTER — TRANSCRIBE ORDER (OUTPATIENT)
Dept: GENERAL RADIOLOGY | Age: 61
End: 2021-08-02

## 2021-08-02 ENCOUNTER — HOSPITAL ENCOUNTER (OUTPATIENT)
Dept: GENERAL RADIOLOGY | Age: 61
Discharge: HOME | End: 2021-08-02
Attending: INTERNAL MEDICINE
Payer: COMMERCIAL

## 2021-08-02 DIAGNOSIS — T18.9XXA INGESTION OF FOREIGN BODY: ICD-10-CM

## 2021-08-02 DIAGNOSIS — T18.9XXA INGESTION OF FOREIGN BODY: Primary | ICD-10-CM

## 2021-08-02 PROCEDURE — 74018 RADEX ABDOMEN 1 VIEW: CPT

## 2021-08-25 DIAGNOSIS — D47.2 MONOCLONAL GAMMOPATHY: Primary | ICD-10-CM

## 2021-08-25 DIAGNOSIS — D50.9 MICROCYTIC ANEMIA: ICD-10-CM

## 2021-08-31 ENCOUNTER — TELEPHONE (OUTPATIENT)
Dept: ONCOLOGY | Age: 61
End: 2021-08-31

## 2021-08-31 ENCOUNTER — VIRTUAL VISIT (OUTPATIENT)
Dept: ONCOLOGY | Age: 61
End: 2021-08-31
Payer: COMMERCIAL

## 2021-08-31 DIAGNOSIS — D50.9 MICROCYTIC ANEMIA: Primary | ICD-10-CM

## 2021-08-31 PROCEDURE — 99214 OFFICE O/P EST MOD 30 MIN: CPT | Performed by: STUDENT IN AN ORGANIZED HEALTH CARE EDUCATION/TRAINING PROGRAM

## 2021-08-31 RX ORDER — ALBUTEROL SULFATE 0.83 MG/ML
2.5 SOLUTION RESPIRATORY (INHALATION) AS NEEDED
Status: CANCELLED
Start: 2021-09-16

## 2021-08-31 RX ORDER — ALBUTEROL SULFATE 0.83 MG/ML
2.5 SOLUTION RESPIRATORY (INHALATION) AS NEEDED
Status: CANCELLED
Start: 2021-09-09

## 2021-08-31 RX ORDER — HEPARIN 100 UNIT/ML
300-500 SYRINGE INTRAVENOUS AS NEEDED
Status: CANCELLED
Start: 2021-09-09

## 2021-08-31 RX ORDER — DIPHENHYDRAMINE HYDROCHLORIDE 50 MG/ML
50 INJECTION, SOLUTION INTRAMUSCULAR; INTRAVENOUS AS NEEDED
Status: CANCELLED
Start: 2021-09-09

## 2021-08-31 RX ORDER — ACETAMINOPHEN 325 MG/1
650 TABLET ORAL AS NEEDED
Status: CANCELLED
Start: 2021-09-09

## 2021-08-31 RX ORDER — SODIUM CHLORIDE 9 MG/ML
25 INJECTION, SOLUTION INTRAVENOUS CONTINUOUS
Status: CANCELLED | OUTPATIENT
Start: 2021-09-16

## 2021-08-31 RX ORDER — ONDANSETRON 2 MG/ML
8 INJECTION INTRAMUSCULAR; INTRAVENOUS AS NEEDED
Status: CANCELLED | OUTPATIENT
Start: 2021-09-09

## 2021-08-31 RX ORDER — HYDROCORTISONE SODIUM SUCCINATE 100 MG/2ML
100 INJECTION, POWDER, FOR SOLUTION INTRAMUSCULAR; INTRAVENOUS AS NEEDED
Status: CANCELLED | OUTPATIENT
Start: 2021-09-09

## 2021-08-31 RX ORDER — EPINEPHRINE 1 MG/ML
0.3 INJECTION, SOLUTION, CONCENTRATE INTRAVENOUS AS NEEDED
Status: CANCELLED | OUTPATIENT
Start: 2021-09-09

## 2021-08-31 RX ORDER — SODIUM CHLORIDE 9 MG/ML
25 INJECTION, SOLUTION INTRAVENOUS CONTINUOUS
Status: CANCELLED | OUTPATIENT
Start: 2021-09-09

## 2021-08-31 RX ORDER — DIPHENHYDRAMINE HYDROCHLORIDE 50 MG/ML
25 INJECTION, SOLUTION INTRAMUSCULAR; INTRAVENOUS AS NEEDED
Status: CANCELLED
Start: 2021-09-16

## 2021-08-31 RX ORDER — HYDROCORTISONE SODIUM SUCCINATE 100 MG/2ML
100 INJECTION, POWDER, FOR SOLUTION INTRAMUSCULAR; INTRAVENOUS AS NEEDED
Status: CANCELLED | OUTPATIENT
Start: 2021-09-16

## 2021-08-31 RX ORDER — ACETAMINOPHEN 325 MG/1
650 TABLET ORAL AS NEEDED
Status: CANCELLED
Start: 2021-09-16

## 2021-08-31 RX ORDER — DIPHENHYDRAMINE HYDROCHLORIDE 50 MG/ML
50 INJECTION, SOLUTION INTRAMUSCULAR; INTRAVENOUS AS NEEDED
Status: CANCELLED
Start: 2021-09-16

## 2021-08-31 RX ORDER — SODIUM CHLORIDE 9 MG/ML
10 INJECTION INTRAMUSCULAR; INTRAVENOUS; SUBCUTANEOUS AS NEEDED
Status: CANCELLED | OUTPATIENT
Start: 2021-09-09

## 2021-08-31 RX ORDER — SODIUM CHLORIDE 9 MG/ML
10 INJECTION INTRAMUSCULAR; INTRAVENOUS; SUBCUTANEOUS AS NEEDED
Status: CANCELLED | OUTPATIENT
Start: 2021-09-16

## 2021-08-31 RX ORDER — EPINEPHRINE 1 MG/ML
0.3 INJECTION, SOLUTION, CONCENTRATE INTRAVENOUS AS NEEDED
Status: CANCELLED | OUTPATIENT
Start: 2021-09-16

## 2021-08-31 RX ORDER — SODIUM CHLORIDE 0.9 % (FLUSH) 0.9 %
10 SYRINGE (ML) INJECTION AS NEEDED
Status: CANCELLED | OUTPATIENT
Start: 2021-09-16

## 2021-08-31 RX ORDER — DIPHENHYDRAMINE HYDROCHLORIDE 50 MG/ML
25 INJECTION, SOLUTION INTRAMUSCULAR; INTRAVENOUS AS NEEDED
Status: CANCELLED
Start: 2021-09-09

## 2021-08-31 RX ORDER — SODIUM CHLORIDE 0.9 % (FLUSH) 0.9 %
10 SYRINGE (ML) INJECTION AS NEEDED
Status: CANCELLED | OUTPATIENT
Start: 2021-09-09

## 2021-08-31 RX ORDER — ONDANSETRON 2 MG/ML
8 INJECTION INTRAMUSCULAR; INTRAVENOUS AS NEEDED
Status: CANCELLED | OUTPATIENT
Start: 2021-09-16

## 2021-08-31 RX ORDER — HEPARIN 100 UNIT/ML
300-500 SYRINGE INTRAVENOUS AS NEEDED
Status: CANCELLED
Start: 2021-09-16

## 2021-08-31 NOTE — PROGRESS NOTES
[place] HEMATOLOGY/ONCOLOGY CLINIC NOTE    ID: Lisa Bailey is a 57 y.o. female    PCP: Mahamed Lozano NP    Referral Requested by: Mahamed Lozano NP    Chief Complaint/Reason for Referral: Microcytic Anemia , MGUS  Chief Complaint   Patient presents with    Follow-up     TALON & MGUS         HISTORY OF PRESENT ILLNESS:    Diagnosis:  1) Microcytic Anemia     2) MGUS       Date of Diagnosis: 4/2016    Current Treatment: Oral Iron BID      Hematologic/Oncologic History:     Briefly this is a very pleasant 48-year-old -American female with a past medical history most notable for peptic ulcer disease, GERD, anxiety/depression, hypothyroidism, asthma, and a longstanding history of iron deficiency anemia for which the patient had been taking oral iron intermittently. On chart review, the patient is seen to have had evidence of iron deficiency with microcytic anemia that was worsening since September 2018. The patient reports that she has a distant history of peptic ulcer disease but is currently having symptoms that are consistent with past episodes of gastric ulcers. She describes aching pain at night that wakes her from sleep in the stomach that is relieved by acid reflux medications. The patient does take Protonix daily, and reports that she is requiring this medicine in order to maintain management of her GERD and stomach discomfort. The patient has been taking oral iron supplementation as prescribed, however she has been having significant difficulty with toleration as the medication causes constipation and nausea. The patient has tried MiraLAX to relieve some of her constipation symptoms of this continues to be problematic for her. Family history reviewed, significant for breast cancer in her sister age 61. No other known cancer history in the family. The patient denies any family history of thalassemia or other blood disorder.     Social history reviewed, significant for only tobacco use, half a pack per day for the last 30 years. No reported illicit drug use.      5/25/21: The patient denies any new clinical changes. She reports marked improvement of fatigue, interval resolution of abdominal pain. She continues to have some constipation which limits her ability to tolerate oral iron. Since last encounter the patient had IV iron infusion on 04/05/2021 as well as 04/12/2021. Repeat labs from after iron infusion were not available prior to patient encounter. Interval History:   8/31/21: Patient seen and examined virtually for this encounter, using audiovisual conferencing allergy. Patient reports that she has worsening fatigue, generalized symptoms of malaise since last encounter. She reports that she feels like she is iron deficient again. Most recent iron studies were completed on August 25, 2021 showing mild iron deficiency with iron saturation of 17%, ferritin 27. The patient did have colonoscopy and EGD that showed erosive gastritis and esophagitis. colonoscopy was completed showing no Bleeding tumors or masses. Otherwise, patient has no other constitutional symptoms. ECOG Performance Status: (0) Fully active, able to carry on all predisease performance without restriction      ALLERGIES:  No Known Allergies    MEDICATIONS:  Current Outpatient Medications on File Prior to Visit   Medication Sig Dispense Refill    ALPRAZolam (XANAX) 0.5 mg tablet TAKE 1 TABLET BY MOUTH TWICE DAILY as needed for anxiety 180 Tablet 0    methocarbamoL (ROBAXIN) 500 mg tablet Take 1 Tablet by mouth nightly. For tension headaches 30 Tablet 2    ferrous sulfate (SLOW FE) 142 mg (45 mg iron) ER tablet Take 1 Tab by mouth two (2) times a day. 60 Tab 5    gabapentin (NEURONTIN) 100 mg capsule Take 1 Cap by mouth two (2) times a day. Max Daily Amount: 200 mg. 60 Cap 2    meloxicam (MOBIC) 7.5 mg tablet Take 1 Tab by mouth daily.  30 Tab 5    pantoprazole (PROTONIX) 40 mg tablet TAKE 1 TABLET DAILY by mouth 90 Tab 4    lisinopril-hydroCHLOROthiazide (PRINZIDE, ZESTORETIC) 20-25 mg per tablet Take 1 Tab by mouth daily. 30 Tab 2    aspirin/salicylamide/caffeine (BC HEADACHE POWDER PO) Take 1 Package by mouth.  azelastine (ASTELIN) 137 mcg (0.1 %) nasal spray 1 Spray as needed for Rhinitis. Use in each nostril as directed      cromolyn (OPTICROM) 4 % ophthalmic solution Administer 1 Drop to both eyes four (4) times daily. Use in affected eye(s) 10 mL 0     No current facility-administered medications on file prior to visit. PMH:  Past Medical History:   Diagnosis Date    Chronic pain     Hypertension     Microcytic anemia 3/25/2021    Non-nicotine vapor product user     Psychiatric disorder     anxiety     Past Surgical History:   Procedure Laterality Date    HC LAP BAND ADJUST PROCEDURE      HX BREAST BIOPSY Left     years ago    HX BREAST BIOPSY Right 06/2018    Benign    HX CERVICAL FUSION      HX CHOLECYSTECTOMY      HX GASTRIC BYPASS      HX HYSTERECTOMY      HX ORTHOPAEDIC      foot surgery--bunions--bilateral    HX ORTHOPAEDIC      ACDF    ND REMOVAL GALLBLADDER       Patient Active Problem List   Diagnosis Code    HTN (hypertension) I10    Anxiety F41.9    GERD without esophagitis K21.9    Spondylolisthesis of lumbar region M43.16    S/P lumbar spinal fusion Z98.1    Microcytic anemia D50.9    Monoclonal gammopathy D47.2       SOCIAL AND FAMILY HISTORY:  Social History     Socioeconomic History    Marital status:      Spouse name: Not on file    Number of children: Not on file    Years of education: Not on file    Highest education level: Not on file   Tobacco Use    Smoking status: Current Every Day Smoker     Packs/day: 0.50     Years: 25.00     Pack years: 12.50     Types: Cigarettes    Smokeless tobacco: Never Used   Substance and Sexual Activity    Alcohol use:  Yes     Alcohol/week: 0.0 standard drinks     Comment: rare/1-2 per month  Drug use: No    Sexual activity: Yes     Partners: Male     Birth control/protection: None     Social Determinants of Health     Financial Resource Strain:     Difficulty of Paying Living Expenses:    Food Insecurity:     Worried About Running Out of Food in the Last Year:     [address] in the Last Year:    Transportation Needs:     Lack of Transportation (Medical):  Lack of Transportation (Non-Medical):    Physical Activity:     Days of Exercise per Week:     Minutes of Exercise per Session:    Stress:     Feeling of Stress :    Social Connections:     Frequency of Communication with Friends and Family:     Frequency of Social Gatherings with Friends and Family:     Attends L[employer] Services:     Active Member of Clubs or Organizations:     Attends Club or Organization Meetings:     Marital Status:      family history includes Breast Cancer in her paternal aunt; Breast Cancer (age of onset: 72) in her sister; Diabetes in her father and mother; Heart Disease in her father and mother; Other in her sister. REVIEW OF SYSTEMS:   Review of Systems   Constitutional: Negative for fever and weight loss. HENT: Negative. Negative for nosebleeds. Eyes: Negative. Respiratory: Negative. Negative for cough, hemoptysis and shortness of breath. Cardiovascular: Negative. Negative for chest pain and leg swelling. Gastrointestinal: Negative for abdominal pain, blood in stool, constipation, diarrhea, heartburn, melena, nausea and vomiting. Genitourinary: Negative. Negative for hematuria. Musculoskeletal: Negative. Negative for falls and myalgias. Skin: Negative. Negative for rash. Neurological: Negative. Endo/Heme/Allergies: Negative. PHYSICAL EXAMINATION:  There were no vitals filed for this visit.     General: alert, cooperative, no distress   Mental  status: normal mood, behavior, speech, dress, motor activity, and thought processes, able to follow commands HENT: NCAT   Neck: no visualized mass   Resp: no respiratory distress   Neuro: no gross deficits   Skin: no discoloration or lesions of concern on visible areas   Psychiatric: normal affect, consistent with stated mood, no evidence of hallucinations     DATA REVIEW:     Lab Results   Component Value Date    HGB 12.7 08/25/2021    HCT 39.6 08/25/2021    WBC 4.5 08/25/2021     08/25/2021       No components found for: NEUTOPHILPCT, LYMPHOPCT, MONOPCT, EOSPCT  Lab Results   Component Value Date     08/25/2021    K 3.8 08/25/2021     (H) 08/25/2021    BUN 11 08/25/2021     Lab Results   Component Value Date    AST 8 (L) 08/25/2021    ALT 10 (L) 08/25/2021    TBILI 0.2 08/25/2021     Chart from patient's PCP reviewed lab values to detailed in HPI. Most recent iron saturation of only 3%, elevated TIBC, no ferritin was ordered. ASSESSMENT & PLAN:    79-year-old female with a past medical history notable for gastroesophageal reflux disorder, history of gastric ulcers, anxiety, history of spinal stenosis, presents for evaluation of treatment of chronic microcytic anemia that has been refractory to oral iron supplementation. She received IV iron with Injectafer x2. She presents today for follow-up    - # Microcytic Anemia of secondary to iron deficiency anemia  -Likely secondary to erosive gastritis with some amount of chronic GI bleeding in addition to poor iron absorption.  - She has had initially a good response to IV iron therapy.    -Now has returned symptoms of fatigue, with iron saturation of 17%, ferritin 27.  -Plan for repeat IV iron treatment with Injectafer x2 more doses. - # Monoclonal Gammopathy of unknown significance   - 0.5 g/dl m-spike. IgA lambda light chain.   - 24 hour urine shows no paraprotein. - No other clinical signs or findings of myeloma   -3-month follow-up shows no evidence of M spike on SPEP. No clinical symptoms.     Plan for IV iron administration with follow-up 2 months after last IV iron with repeat CBC, iron studies      Return visit:    Follow-up and Dispositions    · Return in about 3 months (around 11/30/2021).          Chun Mills MD  Medical Oncologist   [facility]

## 2021-08-31 NOTE — PROGRESS NOTES
Edu Hutson is a 57 y.o. female virtually seeing Dr. Ella Del Castillo today for TALON, MGUS and review of labs f/u. Pt denies pain. Chief Complaint   Patient presents with    Follow-up     TALON & MGUS     1. Have you been to the ER, urgent care clinic since your last visit? Hospitalized since your last visit? No    2. Have you seen or consulted any other health care providers outside of the [address] [place] [address] since your last visit? Include any pap smears or colon screening.  No

## 2021-08-31 NOTE — TELEPHONE ENCOUNTER
Attempted to reach patient to schedule 2 month follow up with Dr Raúl Bowman. No answer.   Left message requesting return call

## 2021-09-02 ENCOUNTER — HOSPITAL ENCOUNTER (OUTPATIENT)
Dept: INFUSION THERAPY | Age: 61
End: 2021-09-02

## 2021-09-02 ENCOUNTER — TELEPHONE (OUTPATIENT)
Dept: ONCOLOGY | Age: 61
End: 2021-09-02

## 2021-09-02 DIAGNOSIS — D50.9 MICROCYTIC ANEMIA: ICD-10-CM

## 2021-09-02 DIAGNOSIS — D50.9 IRON DEFICIENCY ANEMIA, UNSPECIFIED IRON DEFICIENCY ANEMIA TYPE: ICD-10-CM

## 2021-09-02 NOTE — TELEPHONE ENCOUNTER
Geronimo Moore spoke with patient and insurance company. Patient rescheduled for next week. Will follow-up on authorization.

## 2021-09-02 NOTE — TELEPHONE ENCOUNTER
Received a message from Autumn with [facility] stating this patient is scheduled for an Iron Infusion today at 1000 but the authorization is still pending. She would like for the Doctor/NP to call to get this authorized.     Case # [id number]    Call 6

## 2021-09-07 ENCOUNTER — TELEPHONE (OUTPATIENT)
Dept: ONCOLOGY | Age: 61
End: 2021-09-07

## 2021-09-07 NOTE — TELEPHONE ENCOUNTER
PHI verified and HIPPA verified by two patient identifiers. Pt informed nurse she spoke w/ the insurance company and she able to received IV iron but it needs to be at a stand alone clinic. Pt has had IV iron at [facility] by [facility] which is a stand alone clinic.

## 2021-09-07 NOTE — TELEPHONE ENCOUNTER
Called and left message on voicemail requesting a return call because iron infusions have been denied

## 2021-09-07 NOTE — TELEPHONE ENCOUNTER
Returned call. Was on phone with [facility] reference .  Please return call

## 2021-09-08 ENCOUNTER — TELEPHONE (OUTPATIENT)
Dept: ONCOLOGY | Age: 61
End: 2021-09-08

## 2021-09-08 DIAGNOSIS — D50.9 IRON DEFICIENCY ANEMIA, UNSPECIFIED IRON DEFICIENCY ANEMIA TYPE: Primary | ICD-10-CM

## 2021-09-08 DIAGNOSIS — D50.9 MICROCYTIC ANEMIA: ICD-10-CM

## 2021-09-08 NOTE — TELEPHONE ENCOUNTER
Return call placed to pt. HIPAA verified by two patient identifiers. Pt informed we have canceled her IV iron infusions and that I have sent a email to Jaime whitmore/ Nicole 27 clearance to call pt's insurance to find out how we can get pt scheduled for IV iron. Pt verbalized understanding.

## 2021-09-09 ENCOUNTER — HOSPITAL ENCOUNTER (OUTPATIENT)
Dept: INFUSION THERAPY | Age: 61
End: 2021-09-09

## 2021-09-16 ENCOUNTER — APPOINTMENT (OUTPATIENT)
Dept: INFUSION THERAPY | Age: 61
End: 2021-09-16

## 2021-09-16 DIAGNOSIS — D50.9 MICROCYTIC ANEMIA: Primary | ICD-10-CM

## 2021-09-17 RX ORDER — ACETAMINOPHEN 325 MG/1
650 TABLET ORAL AS NEEDED
Status: CANCELLED
Start: 2021-09-24

## 2021-09-17 RX ORDER — ALBUTEROL SULFATE 0.83 MG/ML
2.5 SOLUTION RESPIRATORY (INHALATION) AS NEEDED
Status: CANCELLED
Start: 2021-09-24

## 2021-09-17 RX ORDER — SODIUM CHLORIDE 9 MG/ML
10 INJECTION INTRAMUSCULAR; INTRAVENOUS; SUBCUTANEOUS AS NEEDED
Status: CANCELLED | OUTPATIENT
Start: 2021-09-24

## 2021-09-17 RX ORDER — HEPARIN 100 UNIT/ML
300-500 SYRINGE INTRAVENOUS AS NEEDED
Status: CANCELLED
Start: 2021-09-24

## 2021-09-17 RX ORDER — SODIUM CHLORIDE 9 MG/ML
25 INJECTION, SOLUTION INTRAVENOUS CONTINUOUS
Status: CANCELLED | OUTPATIENT
Start: 2021-09-24

## 2021-09-17 RX ORDER — HYDROCORTISONE SODIUM SUCCINATE 100 MG/2ML
100 INJECTION, POWDER, FOR SOLUTION INTRAMUSCULAR; INTRAVENOUS AS NEEDED
Status: CANCELLED | OUTPATIENT
Start: 2021-09-24

## 2021-09-17 RX ORDER — DIPHENHYDRAMINE HYDROCHLORIDE 50 MG/ML
25 INJECTION, SOLUTION INTRAMUSCULAR; INTRAVENOUS AS NEEDED
Status: CANCELLED
Start: 2021-09-24

## 2021-09-17 RX ORDER — ONDANSETRON 2 MG/ML
8 INJECTION INTRAMUSCULAR; INTRAVENOUS AS NEEDED
Status: CANCELLED | OUTPATIENT
Start: 2021-09-24

## 2021-09-17 RX ORDER — DIPHENHYDRAMINE HYDROCHLORIDE 50 MG/ML
50 INJECTION, SOLUTION INTRAMUSCULAR; INTRAVENOUS AS NEEDED
Status: CANCELLED
Start: 2021-09-24

## 2021-09-17 RX ORDER — EPINEPHRINE 1 MG/ML
0.3 INJECTION, SOLUTION, CONCENTRATE INTRAVENOUS AS NEEDED
Status: CANCELLED | OUTPATIENT
Start: 2021-09-24

## 2021-09-17 RX ORDER — SODIUM CHLORIDE 0.9 % (FLUSH) 0.9 %
10 SYRINGE (ML) INJECTION AS NEEDED
Status: CANCELLED | OUTPATIENT
Start: 2021-09-24

## 2021-09-20 ENCOUNTER — TELEPHONE (OUTPATIENT)
Dept: ONCOLOGY | Age: 61
End: 2021-09-20

## 2021-09-21 DIAGNOSIS — D50.9 IRON DEFICIENCY ANEMIA, UNSPECIFIED IRON DEFICIENCY ANEMIA TYPE: Primary | ICD-10-CM

## 2021-09-21 NOTE — TELEPHONE ENCOUNTER
Return call placed to pt. HIPAA verified by two patient identifiers. Pt informed we are referring her to Dr. Raudel Wood the rheumatologist to get the IV iron approved by her insurance company. Pt advised to continue taking the oral iron pills. Pt verbalized understanding.

## 2021-09-22 NOTE — TELEPHONE ENCOUNTER
Referral sent to Dr. Leti Martinez office for IV iron infusion at [facility]. Dr. Alyssia Mcfarlane office waiting on approval from [employer].

## 2021-09-22 NOTE — TELEPHONE ENCOUNTER
Referral sent to Dr. Alexey Troncoso office for IV iron infusion at [facility]. Dr. Hanna Werner office [place] [facility]

## 2021-09-27 NOTE — PROGRESS NOTES
[facility] Rheumatology  OBIC Note    Date: 2021    Rheumatology OBIC COVID-19 SCREENING  The patient was asked the following questions and answered as documented below:    1. Do you have any symptoms of COVID-19? SOB, coughing, fever, or generally not feeling well? NO  2. Have you been exposed to COVID-19 recently? NO  3. Have you had any recent contact with family/friend that has a pending COVID test? NO    Name: Manny Moore  MRN: [id number]       : 1960  Diagnosis: Iron Deficiency Anemia  Treatment: Venofer 200mg weekly  Provider: Julian Colvin MD  Supervising Provider: Dr. Esvin Hawk    Patient arrived to [facility] at 1010. Ms. Millicent Pederson allergies reviewed and she agreed to receiving today's treatment. A physical assessment was performed initially and post-treatment. Pt. denies new complaints today. 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 Increased fatigue. Ms. Iván Earl vitals were monitored before, during and after medication administration. Visit Vitals  BP (!) 188/91   Pulse 60   Temp 97 °F (36.1 °C)   Resp 16   SpO2 98%      Medications given per providers orders:     Administrations This Visit     iron sucrose (VENOFER) injection 200 mg     Admin Date  2021 Action  Given Dose  200 mg Route  IntraVENous Administered By  Agustin Sims RN          sodium chloride (NS) flush 10 mL     Admin Date  2021 Action  Given Dose  10 mL Route  IntraVENous Administered By  Agustin Sims RN               Venofer 50mg/2.5ml vials x 4 (Total dose 200mg)   NDC 8737-9743-04  Lot # [id number]  Exp 2023    10ml 0.9% Normal Saline Flush x2  NDC 1828-2937-82  Lot # [id number]  Exp 2022    Lines: 24G RAC. Blood return noted and IV site assessed before, during, and after treatment. Line flushed with 10-30 ml's 0.9% Normal Saline solution per protocol. Access was removed from Ms. Millicent Pederson after completion of infusion/injection.    Ms. Millicent Pederson tolerated the treatment without complaints and no medication reactions were seen while in presence of this RN. Patient provided with AVS, which included future appointments and written educational material regarding Venofer. All of the patients questions were answered and then discharged ambulatory from Rheumatology OBIC in stable condition at 1050. Future Appointments   Date Time Provider eNena Francia   10/5/2021 10:00 AM INFUSIONINJ_RC AOCR BS AMB   10/12/2021 10:00 AM INFUSIONINJ_RC AOCR BS AMB   10/25/2021  1:00 PM Mohan Dhillon MD [facility]     Delbert Rossi RN  2021  12:00p    ---  ATTENDING ATTESTATION    Ebbie Newborn Gertha Barthel, hereby attest that the medical record entry for 21 accurately reflects signatures/notations that I made in my capacity as treating physician when I treated/diagnosed the above listed patient. I do hereby attest that this information is true, accurate and complete to the best of my knowledge and I understand that any falsification, omission, or concealment of material fact may subject me to administrative, civil, or criminal liability.   Gertha Barthel, MD S  Adult Rheumatology  Signed 22 7:23 AM

## 2021-09-28 ENCOUNTER — OFFICE VISIT (OUTPATIENT)
Dept: RHEUMATOLOGY | Age: 61
End: 2021-09-28
Payer: COMMERCIAL

## 2021-09-28 VITALS
TEMPERATURE: 97 F | HEART RATE: 60 BPM | RESPIRATION RATE: 16 BRPM | SYSTOLIC BLOOD PRESSURE: 188 MMHG | OXYGEN SATURATION: 98 % | DIASTOLIC BLOOD PRESSURE: 91 MMHG

## 2021-09-28 DIAGNOSIS — D50.9 MICROCYTIC ANEMIA: ICD-10-CM

## 2021-09-28 DIAGNOSIS — D50.9 IRON DEFICIENCY ANEMIA, UNSPECIFIED IRON DEFICIENCY ANEMIA TYPE: Primary | ICD-10-CM

## 2021-09-28 PROCEDURE — 96374 THER/PROPH/DIAG INJ IV PUSH: CPT

## 2021-09-28 RX ORDER — ONDANSETRON 2 MG/ML
8 INJECTION INTRAMUSCULAR; INTRAVENOUS AS NEEDED
Status: CANCELLED | OUTPATIENT
Start: 2021-10-05

## 2021-09-28 RX ORDER — HYDROCORTISONE SODIUM SUCCINATE 100 MG/2ML
100 INJECTION, POWDER, FOR SOLUTION INTRAMUSCULAR; INTRAVENOUS AS NEEDED
Status: CANCELLED | OUTPATIENT
Start: 2021-10-05

## 2021-09-28 RX ORDER — ALBUTEROL SULFATE 0.83 MG/ML
2.5 SOLUTION RESPIRATORY (INHALATION) AS NEEDED
Status: CANCELLED
Start: 2021-10-05

## 2021-09-28 RX ORDER — SODIUM CHLORIDE 9 MG/ML
10 INJECTION INTRAMUSCULAR; INTRAVENOUS; SUBCUTANEOUS AS NEEDED
Status: CANCELLED | OUTPATIENT
Start: 2021-10-05

## 2021-09-28 RX ORDER — SODIUM CHLORIDE 0.9 % (FLUSH) 0.9 %
10 SYRINGE (ML) INJECTION AS NEEDED
Status: DISCONTINUED | OUTPATIENT
Start: 2021-09-28 | End: 2021-09-28 | Stop reason: HOSPADM

## 2021-09-28 RX ORDER — DIPHENHYDRAMINE HYDROCHLORIDE 50 MG/ML
25 INJECTION, SOLUTION INTRAMUSCULAR; INTRAVENOUS AS NEEDED
Status: CANCELLED
Start: 2021-10-05

## 2021-09-28 RX ORDER — EPINEPHRINE 1 MG/ML
0.3 INJECTION, SOLUTION, CONCENTRATE INTRAVENOUS AS NEEDED
Status: CANCELLED | OUTPATIENT
Start: 2021-10-05

## 2021-09-28 RX ORDER — DIPHENHYDRAMINE HYDROCHLORIDE 50 MG/ML
50 INJECTION, SOLUTION INTRAMUSCULAR; INTRAVENOUS AS NEEDED
Status: CANCELLED
Start: 2021-10-05

## 2021-09-28 RX ORDER — SODIUM CHLORIDE 9 MG/ML
25 INJECTION, SOLUTION INTRAVENOUS CONTINUOUS
Status: CANCELLED | OUTPATIENT
Start: 2021-10-05

## 2021-09-28 RX ORDER — SODIUM CHLORIDE 0.9 % (FLUSH) 0.9 %
10 SYRINGE (ML) INJECTION AS NEEDED
Status: CANCELLED | OUTPATIENT
Start: 2021-10-05

## 2021-09-28 RX ORDER — HEPARIN 100 UNIT/ML
300-500 SYRINGE INTRAVENOUS AS NEEDED
Status: CANCELLED
Start: 2021-10-05

## 2021-09-28 RX ORDER — ACETAMINOPHEN 325 MG/1
650 TABLET ORAL AS NEEDED
Status: CANCELLED
Start: 2021-10-05

## 2021-09-28 RX ADMIN — Medication 10 ML: at 10:25

## 2021-09-29 NOTE — PROGRESS NOTES
[facility] Rheumatology  OBIC Note    Date: 2021    Rheumatology OBIC COVID-19 SCREENING  The patient was asked the following questions and answered as documented below:    1. Do you have any symptoms of COVID-19? SOB, coughing, fever, or generally not feeling well? NO  2. Have you been exposed to COVID-19 recently? NO  3. Have you had any recent contact with family/friend that has a pending COVID test? NO    Name: Cresencio Ray  MRN: [id number]       : 1960  Diagnosis: Iron Deficiency Anemia  Treatment: Venofer 200mg  Provider: Dr. Abner Collins    Patient arrived to [facility] at 1000. Ms. Kyle Sanabria allergies reviewed and she agreed to receiving today's treatment. Pt. denies new complaints today. Ms. Thad Vaca vitals were monitored before, during and after medication administration. Vitals:    10/05/21 1009 10/05/21 1034   BP: (!) 168/87 (!) 172/78   Pulse: 72 69   Resp: 16 16   Temp: 97.8 °F (36.6 °C)    SpO2: 98% 98%     Medications given per providers orders:     Administrations This Visit     iron sucrose (VENOFER) injection 200 mg     Admin Date  10/05/2021 Action  Given Dose  200 mg Route  IntraVENous Administered By  Yoel Ware RN          sodium chloride (NS) flush 10 mL     Admin Date  10/05/2021 Action  Given Dose  10 mL Route  IntraVENous Administered By  Yoel Ware RN           Admin Date  10/05/2021 Action  Given Dose  10 mL Route  IntraVENous Administered By  Yoel Ware RN              Venofer 50mg/2.5ml vials x 4 (Total dose 200mg)   NDC 6069-0823-86  Lot # [id number]  Exp 2023     10ml 0.9% Normal Saline Flush x2  NDC 9369-4145-01  Lot # [id number]  Exp 2022    Lines: 24G RAC  Blood return noted and IV site assessed before, during, and after treatment. Line flushed with 10-30 ml's 0.9% Normal Saline solution per protocol. Access was removed from Ms. Kyle Sanabria after completion of infusion/injection.    Ms. Kyle Sanabria tolerated the treatment without complaints and no medication reactions were seen while in presence of this RN. Patient provided with AVS, which included future appointments and written educational material regarding Venofer. All of the patients questions were answered and then discharged ambulatory from Rheumatology OBIC in stable condition at 1045.      Future Appointments   Date Time Provider Neena Feldman   10/12/2021 10:00 AM INFUSIONINJ_RC AOCR ARLEN JAVIER   10/25/2021  1:00 PM Larissa Matamoros MD [facility]/LAKESHIA Smith RN  October 5, 2021  11:00a

## 2021-09-29 NOTE — PROGRESS NOTES
[facility] Rheumatology  OBIC Note    Date: 2021    Rheumatology OBIC COVID-19 SCREENING  The patient was asked the following questions and answered as documented below:    1. Do you have any symptoms of COVID-19? SOB, coughing, fever, or generally not feeling well? NO  2. Have you been exposed to COVID-19 recently? NO  3. Have you had any recent contact with family/friend that has a pending COVID test? NO    Name: Teola Apgar  MRN: [id number]       : 1960  Diagnosis: Iron Deficiency Anemia  Treatment: Venofer 200mg  Provider: Dr. Robert Whaley    Patient arrived to [facility] at [phone]. Ms. Samuel Woodruff allergies reviewed and she agreed to receiving today's treatment. A physical assessment was performed initially and post-treatment. Pt. denies new complaints today. Ms. Modesto Bell vitals were monitored before, during and after medication administration. Vitals:    10/12/21 1022 10/12/21 1040   BP: (!) 178/89 (!) 177/91   Pulse: 73 68   Resp: 17 18   Temp: 97 °F (36.1 °C)    SpO2: 98% 98%        Medications given per providers orders:     Administrations This Visit     iron sucrose (VENOFER) injection 200 mg     Admin Date  10/12/2021 Action  Given Dose  200 mg Route  IntraVENous Administered By  Shaggy Troncoso RN          sodium chloride (NS) flush 10 mL     Admin Date  10/12/2021 Action  Given Dose  10 mL Route  IntraVENous Administered By  Shaggy Troncoso RN           Admin Date  10/12/2021 Action  Given Dose  10 mL Route  IntraVENous Administered By  Shaggy Troncoso RN              Venofer 50mg/2.5ml vials x 4 (Total dose 200mg)   NDC 7732-5506-68  Lot # [id number]  Exp 2023     10ml 0.9% Normal Saline Flush x2  NDC 5039-8878-18  Lot # [id number]  Exp 2022    Lines: 22G RAC. Blood return noted and IV site assessed before, during, and after treatment. Line flushed with 10-30 ml's 0.9% Normal Saline solution per protocol. Access was removed from Ms. Samuel Woodruff after completion of infusion/injection. Ms. Pravin Otoole tolerated the treatment without complaints and no medication reactions were seen while in presence of this RN. Patient provided with AVS, which included future appointments and written educational material regarding Venofer. All of the patients questions were answered and then discharged ambulatory from Rheumatology OBIC in stable condition at 1045.      Future Appointments   Date Time Provider Neena Feldman   10/19/2021 10:00 AM INFUSIONINJ_RC AOCR BS AMB   10/25/2021  1:00 PM Tyler Lynch MD [facility]   10/26/2021  8:00 AM INFUSIONINJ_RC AOCR BS AMB     Dia Cabral RN  October 12, 2021  1:44 PM

## 2021-10-05 ENCOUNTER — OFFICE VISIT (OUTPATIENT)
Dept: RHEUMATOLOGY | Age: 61
End: 2021-10-05
Payer: COMMERCIAL

## 2021-10-05 VITALS
RESPIRATION RATE: 16 BRPM | HEART RATE: 69 BPM | SYSTOLIC BLOOD PRESSURE: 172 MMHG | DIASTOLIC BLOOD PRESSURE: 78 MMHG | OXYGEN SATURATION: 98 % | TEMPERATURE: 97.8 F

## 2021-10-05 DIAGNOSIS — D50.9 IRON DEFICIENCY ANEMIA, UNSPECIFIED IRON DEFICIENCY ANEMIA TYPE: Primary | ICD-10-CM

## 2021-10-05 DIAGNOSIS — D50.9 MICROCYTIC ANEMIA: ICD-10-CM

## 2021-10-05 PROCEDURE — 96374 THER/PROPH/DIAG INJ IV PUSH: CPT

## 2021-10-05 RX ORDER — HEPARIN 100 UNIT/ML
300-500 SYRINGE INTRAVENOUS AS NEEDED
Status: CANCELLED
Start: 2021-10-12

## 2021-10-05 RX ORDER — EPINEPHRINE 1 MG/ML
0.3 INJECTION, SOLUTION, CONCENTRATE INTRAVENOUS AS NEEDED
Status: CANCELLED | OUTPATIENT
Start: 2021-10-12

## 2021-10-05 RX ORDER — ONDANSETRON 2 MG/ML
8 INJECTION INTRAMUSCULAR; INTRAVENOUS AS NEEDED
Status: CANCELLED | OUTPATIENT
Start: 2021-10-12

## 2021-10-05 RX ORDER — ALBUTEROL SULFATE 0.83 MG/ML
2.5 SOLUTION RESPIRATORY (INHALATION) AS NEEDED
Status: CANCELLED
Start: 2021-10-12

## 2021-10-05 RX ORDER — SODIUM CHLORIDE 9 MG/ML
25 INJECTION, SOLUTION INTRAVENOUS CONTINUOUS
Status: CANCELLED | OUTPATIENT
Start: 2021-10-12

## 2021-10-05 RX ORDER — DIPHENHYDRAMINE HYDROCHLORIDE 50 MG/ML
50 INJECTION, SOLUTION INTRAMUSCULAR; INTRAVENOUS AS NEEDED
Status: CANCELLED
Start: 2021-10-12

## 2021-10-05 RX ORDER — SODIUM CHLORIDE 0.9 % (FLUSH) 0.9 %
10 SYRINGE (ML) INJECTION AS NEEDED
Status: CANCELLED | OUTPATIENT
Start: 2021-10-12

## 2021-10-05 RX ORDER — DIPHENHYDRAMINE HYDROCHLORIDE 50 MG/ML
25 INJECTION, SOLUTION INTRAMUSCULAR; INTRAVENOUS AS NEEDED
Status: CANCELLED
Start: 2021-10-12

## 2021-10-05 RX ORDER — SODIUM CHLORIDE 0.9 % (FLUSH) 0.9 %
10 SYRINGE (ML) INJECTION AS NEEDED
Status: DISCONTINUED | OUTPATIENT
Start: 2021-10-05 | End: 2021-10-05 | Stop reason: HOSPADM

## 2021-10-05 RX ORDER — HYDROCORTISONE SODIUM SUCCINATE 100 MG/2ML
100 INJECTION, POWDER, FOR SOLUTION INTRAMUSCULAR; INTRAVENOUS AS NEEDED
Status: CANCELLED | OUTPATIENT
Start: 2021-10-12

## 2021-10-05 RX ORDER — SODIUM CHLORIDE 9 MG/ML
10 INJECTION INTRAMUSCULAR; INTRAVENOUS; SUBCUTANEOUS AS NEEDED
Status: CANCELLED | OUTPATIENT
Start: 2021-10-12

## 2021-10-05 RX ORDER — ACETAMINOPHEN 325 MG/1
650 TABLET ORAL AS NEEDED
Status: CANCELLED
Start: 2021-10-12

## 2021-10-05 RX ADMIN — Medication 10 ML: at 10:20

## 2021-10-05 RX ADMIN — Medication 10 ML: at 10:22

## 2021-10-12 ENCOUNTER — OFFICE VISIT (OUTPATIENT)
Dept: RHEUMATOLOGY | Age: 61
End: 2021-10-12
Payer: COMMERCIAL

## 2021-10-12 VITALS
HEART RATE: 68 BPM | DIASTOLIC BLOOD PRESSURE: 91 MMHG | TEMPERATURE: 97 F | OXYGEN SATURATION: 98 % | RESPIRATION RATE: 18 BRPM | SYSTOLIC BLOOD PRESSURE: 177 MMHG

## 2021-10-12 DIAGNOSIS — D50.9 IRON DEFICIENCY ANEMIA, UNSPECIFIED IRON DEFICIENCY ANEMIA TYPE: Primary | ICD-10-CM

## 2021-10-12 DIAGNOSIS — D50.9 MICROCYTIC ANEMIA: ICD-10-CM

## 2021-10-12 PROCEDURE — 96374 THER/PROPH/DIAG INJ IV PUSH: CPT

## 2021-10-12 RX ORDER — ONDANSETRON 2 MG/ML
8 INJECTION INTRAMUSCULAR; INTRAVENOUS AS NEEDED
Status: CANCELLED | OUTPATIENT
Start: 2021-10-19

## 2021-10-12 RX ORDER — DIPHENHYDRAMINE HYDROCHLORIDE 50 MG/ML
25 INJECTION, SOLUTION INTRAMUSCULAR; INTRAVENOUS AS NEEDED
Status: CANCELLED
Start: 2021-10-19

## 2021-10-12 RX ORDER — ALBUTEROL SULFATE 0.83 MG/ML
2.5 SOLUTION RESPIRATORY (INHALATION) AS NEEDED
Status: CANCELLED
Start: 2021-10-19

## 2021-10-12 RX ORDER — ACETAMINOPHEN 325 MG/1
650 TABLET ORAL AS NEEDED
Status: CANCELLED
Start: 2021-10-19

## 2021-10-12 RX ORDER — SODIUM CHLORIDE 9 MG/ML
25 INJECTION, SOLUTION INTRAVENOUS CONTINUOUS
Status: CANCELLED | OUTPATIENT
Start: 2021-10-19

## 2021-10-12 RX ORDER — HEPARIN 100 UNIT/ML
300-500 SYRINGE INTRAVENOUS AS NEEDED
Status: CANCELLED
Start: 2021-10-19

## 2021-10-12 RX ORDER — SODIUM CHLORIDE 0.9 % (FLUSH) 0.9 %
10 SYRINGE (ML) INJECTION AS NEEDED
Status: DISCONTINUED | OUTPATIENT
Start: 2021-10-12 | End: 2021-10-12 | Stop reason: HOSPADM

## 2021-10-12 RX ORDER — SODIUM CHLORIDE 0.9 % (FLUSH) 0.9 %
10 SYRINGE (ML) INJECTION AS NEEDED
Status: CANCELLED | OUTPATIENT
Start: 2021-10-19

## 2021-10-12 RX ORDER — DIPHENHYDRAMINE HYDROCHLORIDE 50 MG/ML
50 INJECTION, SOLUTION INTRAMUSCULAR; INTRAVENOUS AS NEEDED
Status: CANCELLED
Start: 2021-10-19

## 2021-10-12 RX ORDER — HYDROCORTISONE SODIUM SUCCINATE 100 MG/2ML
100 INJECTION, POWDER, FOR SOLUTION INTRAMUSCULAR; INTRAVENOUS AS NEEDED
Status: CANCELLED | OUTPATIENT
Start: 2021-10-19

## 2021-10-12 RX ORDER — EPINEPHRINE 1 MG/ML
0.3 INJECTION, SOLUTION, CONCENTRATE INTRAVENOUS AS NEEDED
Status: CANCELLED | OUTPATIENT
Start: 2021-10-19

## 2021-10-12 RX ORDER — SODIUM CHLORIDE 9 MG/ML
10 INJECTION INTRAMUSCULAR; INTRAVENOUS; SUBCUTANEOUS AS NEEDED
Status: CANCELLED | OUTPATIENT
Start: 2021-10-19

## 2021-10-12 RX ADMIN — Medication 10 ML: at 10:32

## 2021-10-12 RX ADMIN — Medication 10 ML: at 10:28

## 2021-10-13 NOTE — PROGRESS NOTES
[facility] Rheumatology  OBIC Note    Date: 2021    Rheumatology OBIC COVID-19 SCREENING  The patient was asked the following questions and answered as documented below:    1. Do you have any symptoms of COVID-19? SOB, coughing, fever, or generally not feeling well? NO  2. Have you been exposed to COVID-19 recently? NO  3. Have you had any recent contact with family/friend that has a pending COVID test? NO    Name: Tatum Bateman  MRN: [id number]       : 1960  Diagnosis: Iron Deficiency Anemia  Treatment: Venofer 200mg  Referring Provider: Dr. Mara Bowen  Supervising Provider: Dr. Adonay Ag    Patient arrived to [facility] at 1010. Ms. Molina Wells allergies reviewed and she agreed to receiving today's treatment. A physical assessment was performed initially and post-treatment. Pt. denies new complaints today. Visit Vitals  BP (!) 160/86   Pulse 75   Temp 97.7 °F (36.5 °C)   Resp 18   SpO2 100%        Recent labs results:  Recent Results (from the past 72 hour(s))   METABOLIC PANEL, COMPREHENSIVE    Collection Time: 10/12/21  1:13 PM   Result Value Ref Range    Sodium 140 136 - 145 mmol/L    Potassium 4.0 3.5 - 5.1 mmol/L    Chloride 110 (H) 97 - 108 mmol/L    CO2 28 21 - 32 mmol/L    Anion gap 2 (L) 5 - 15 mmol/L    Glucose 73 65 - 100 mg/dL    BUN 13 6 - 20 MG/DL    Creatinine 0.62 0.55 - 1.02 MG/DL    BUN/Creatinine ratio 21 (H) 12 - 20      GFR est AA >60 >60 ml/min/1.73m2    GFR est non-AA >60 >60 ml/min/1.73m2    Calcium 9.2 8.5 - 10.1 MG/DL    Bilirubin, total 0.3 0.2 - 1.0 MG/DL    ALT (SGPT) 12 12 - 78 U/L    AST (SGOT) 12 (L) 15 - 37 U/L    Alk.  phosphatase 113 45 - 117 U/L    Protein, total 6.9 6.4 - 8.2 g/dL    Albumin 3.7 3.5 - 5.0 g/dL    Globulin 3.2 2.0 - 4.0 g/dL    A-G Ratio 1.2 1.1 - 2.2     IRON PROFILE    Collection Time: 10/12/21  1:13 PM   Result Value Ref Range    Iron 501 (H) 35 - 150 ug/dL    TIBC 536 (H) 250 - 450 ug/dL    Iron % saturation 93 (H) 20 - 50 %   FERRITIN    Collection Time: 10/12/21  1:13 PM   Result Value Ref Range    Ferritin 134 8 - 252 NG/ML   CBC WITH AUTOMATED DIFF    Collection Time: 10/12/21  1:13 PM   Result Value Ref Range    WBC 5.2 3.6 - 11.0 K/uL    RBC 4.21 3.80 - 5.20 M/uL    HGB 14.3 11.5 - 16.0 g/dL    HCT 43.9 35.0 - 47.0 %    .3 (H) 80.0 - 99.0 FL    MCH 34.0 26.0 - 34.0 PG    MCHC 32.6 30.0 - 36.5 g/dL    RDW 15.2 (H) 11.5 - 14.5 %    PLATELET 560 922 - 821 K/uL    MPV 11.7 8.9 - 12.9 FL    NRBC 0.0 0  WBC    ABSOLUTE NRBC 0.00 0.00 - 0.01 K/uL    NEUTROPHILS 70 32 - 75 %    LYMPHOCYTES 21 12 - 49 %    MONOCYTES 8 5 - 13 %    EOSINOPHILS 1 0 - 7 %    BASOPHILS 0 0 - 1 %    IMMATURE GRANULOCYTES 0 0.0 - 0.5 %    ABS. NEUTROPHILS 3.6 1.8 - 8.0 K/UL    ABS. LYMPHOCYTES 1.1 0.8 - 3.5 K/UL    ABS. MONOCYTES 0.4 0.0 - 1.0 K/UL    ABS. EOSINOPHILS 0.1 0.0 - 0.4 K/UL    ABS. BASOPHILS 0.0 0.0 - 0.1 K/UL    ABS. IMM. GRANS. 0.0 0.00 - 0.04 K/UL    DF AUTOMATED        Medications given per providers orders:     Administrations This Visit     iron sucrose (VENOFER) injection 200 mg     Admin Date  10/19/2021 Action  Given Dose  200 mg Route  IntraVENous Administered By  Yovany Moctezuma RN          sodium chloride (NS) flush 10 mL     Admin Date  10/19/2021 Action  Given Dose  10 mL Route  IntraVENous Administered By  Yovany Moctezuma RN            Venofer 50mg/2.5ml vials x 4 (Total dose 200mg)   [facility] 9065-9748-61  Lot # [id number]  Exp 02/2023     10ml 0.9% Normal Saline Flush x2  NDC 6039-0461-80  Lot # [id number]  Exp 01/2022    Lines: 22G RAC  Blood return noted and IV site assessed before, during, and after treatment. Line flushed with 10-30 ml's 0.9% Normal Saline solution per protocol. Access was removed from Ms. Zoltan Cantu after completion of injection. Ms. Zoltan Cantu tolerated the treatment without complaints and no medication reactions were seen while in presence of this RN.    Patient provided with AVS, which included future appointments and written educational material regarding Venofer. All of the patients questions were answered and then discharged ambulatory from Rheumatology OBIC in stable condition at 1050.      Future Appointments   Date Time Provider Neena Feldman   10/25/2021  1:00 PM Fletcher Amin MD [facility]/LAKESHIA Lujan RN  October 19, 2021  1:36 PM

## 2021-10-19 ENCOUNTER — OFFICE VISIT (OUTPATIENT)
Dept: RHEUMATOLOGY | Age: 61
End: 2021-10-19
Payer: COMMERCIAL

## 2021-10-19 VITALS
OXYGEN SATURATION: 100 % | SYSTOLIC BLOOD PRESSURE: 160 MMHG | RESPIRATION RATE: 18 BRPM | DIASTOLIC BLOOD PRESSURE: 86 MMHG | TEMPERATURE: 97.7 F | HEART RATE: 75 BPM

## 2021-10-19 DIAGNOSIS — D50.9 IRON DEFICIENCY ANEMIA, UNSPECIFIED IRON DEFICIENCY ANEMIA TYPE: Primary | ICD-10-CM

## 2021-10-19 DIAGNOSIS — D50.9 MICROCYTIC ANEMIA: ICD-10-CM

## 2021-10-19 PROCEDURE — 96374 THER/PROPH/DIAG INJ IV PUSH: CPT

## 2021-10-19 RX ORDER — EPINEPHRINE 1 MG/ML
0.3 INJECTION, SOLUTION, CONCENTRATE INTRAVENOUS AS NEEDED
Status: CANCELLED | OUTPATIENT
Start: 2021-10-26

## 2021-10-19 RX ORDER — SODIUM CHLORIDE 0.9 % (FLUSH) 0.9 %
10 SYRINGE (ML) INJECTION AS NEEDED
Status: CANCELLED | OUTPATIENT
Start: 2021-10-26

## 2021-10-19 RX ORDER — HEPARIN 100 UNIT/ML
300-500 SYRINGE INTRAVENOUS AS NEEDED
Status: CANCELLED
Start: 2021-10-26

## 2021-10-19 RX ORDER — DIPHENHYDRAMINE HYDROCHLORIDE 50 MG/ML
25 INJECTION, SOLUTION INTRAMUSCULAR; INTRAVENOUS AS NEEDED
Status: CANCELLED
Start: 2021-10-26

## 2021-10-19 RX ORDER — SODIUM CHLORIDE 9 MG/ML
10 INJECTION INTRAMUSCULAR; INTRAVENOUS; SUBCUTANEOUS AS NEEDED
Status: CANCELLED | OUTPATIENT
Start: 2021-10-26

## 2021-10-19 RX ORDER — SODIUM CHLORIDE 0.9 % (FLUSH) 0.9 %
10 SYRINGE (ML) INJECTION AS NEEDED
Status: DISCONTINUED | OUTPATIENT
Start: 2021-10-19 | End: 2021-10-19 | Stop reason: HOSPADM

## 2021-10-19 RX ORDER — ALBUTEROL SULFATE 0.83 MG/ML
2.5 SOLUTION RESPIRATORY (INHALATION) AS NEEDED
Status: CANCELLED
Start: 2021-10-26

## 2021-10-19 RX ORDER — HYDROCORTISONE SODIUM SUCCINATE 100 MG/2ML
100 INJECTION, POWDER, FOR SOLUTION INTRAMUSCULAR; INTRAVENOUS AS NEEDED
Status: CANCELLED | OUTPATIENT
Start: 2021-10-26

## 2021-10-19 RX ORDER — ACETAMINOPHEN 325 MG/1
650 TABLET ORAL AS NEEDED
Status: CANCELLED
Start: 2021-10-26

## 2021-10-19 RX ORDER — ONDANSETRON 2 MG/ML
8 INJECTION INTRAMUSCULAR; INTRAVENOUS AS NEEDED
Status: CANCELLED | OUTPATIENT
Start: 2021-10-26

## 2021-10-19 RX ORDER — DIPHENHYDRAMINE HYDROCHLORIDE 50 MG/ML
50 INJECTION, SOLUTION INTRAMUSCULAR; INTRAVENOUS AS NEEDED
Status: CANCELLED
Start: 2021-10-26

## 2021-10-19 RX ORDER — SODIUM CHLORIDE 9 MG/ML
25 INJECTION, SOLUTION INTRAVENOUS CONTINUOUS
Status: CANCELLED | OUTPATIENT
Start: 2021-10-26

## 2021-10-19 RX ADMIN — Medication 10 ML: at 10:27

## 2021-10-25 ENCOUNTER — VIRTUAL VISIT (OUTPATIENT)
Dept: ONCOLOGY | Age: 61
End: 2021-10-25
Payer: COMMERCIAL

## 2021-10-25 DIAGNOSIS — D47.2 MONOCLONAL GAMMOPATHY: ICD-10-CM

## 2021-10-25 DIAGNOSIS — D50.9 IRON DEFICIENCY ANEMIA, UNSPECIFIED IRON DEFICIENCY ANEMIA TYPE: Primary | ICD-10-CM

## 2021-10-25 PROCEDURE — 99214 OFFICE O/P EST MOD 30 MIN: CPT | Performed by: STUDENT IN AN ORGANIZED HEALTH CARE EDUCATION/TRAINING PROGRAM

## 2021-10-25 NOTE — PROGRESS NOTES
[facility] HEMATOLOGY/ONCOLOGY CLINIC NOTE    ID: Carmine Nyhan is a 57 y.o. female    PCP: Eder Gee NP    Referral Requested by: Eder Gee NP    Chief Complaint/Reason for Referral: Microcytic Anemia , MGUS  Chief Complaint   Patient presents with    Follow-up     TALON         HISTORY OF PRESENT ILLNESS:    Diagnosis:  1) Microcytic Anemia     2) MGUS       Date of Diagnosis: 4/2016    Current Treatment: Oral Iron BID      Hematologic/Oncologic History:     Briefly this is a very pleasant 79-year-old -American female with a past medical history most notable for peptic ulcer disease, GERD, anxiety/depression, hypothyroidism, asthma, and a longstanding history of iron deficiency anemia for which the patient had been taking oral iron intermittently. On chart review, the patient is seen to have had evidence of iron deficiency with microcytic anemia that was worsening since September 2018. The patient reports that she has a distant history of peptic ulcer disease but is currently having symptoms that are consistent with past episodes of gastric ulcers. She describes aching pain at night that wakes her from sleep in the stomach that is relieved by acid reflux medications. The patient does take Protonix daily, and reports that she is requiring this medicine in order to maintain management of her GERD and stomach discomfort. The patient has been taking oral iron supplementation as prescribed, however she has been having significant difficulty with toleration as the medication causes constipation and nausea. The patient has tried MiraLAX to relieve some of her constipation symptoms of this continues to be problematic for her. Family history reviewed, significant for breast cancer in her sister age 61. No other known cancer history in the family. The patient denies any family history of thalassemia or other blood disorder.     Social history reviewed, significant for only tobacco use, half a pack per day for the last 30 years. No reported illicit drug use.      5/25/21: The patient denies any new clinical changes. She reports marked improvement of fatigue, interval resolution of abdominal pain. She continues to have some constipation which limits her ability to tolerate oral iron. Since last encounter the patient had IV iron infusion on 04/05/2021 as well as 04/12/2021. Repeat labs from after iron infusion were not available prior to patient encounter. 8/31/21: Patient seen and examined virtually for this encounter, using audiovisual conferencing allergy. Patient reports that she has worsening fatigue, generalized symptoms of malaise since last encounter. She reports that she feels like she is iron deficient again. Most recent iron studies were completed on August 25, 2021 showing mild iron deficiency with iron saturation of 17%, ferritin 27. The patient did have colonoscopy and EGD that showed erosive gastritis and esophagitis. colonoscopy was completed showing no Bleeding tumors or masses. Otherwise, patient has no other constitutional symptoms. Interval History:   Suha Prabhakar, who was evaluated through a synchronous (real-time) audio-video encounter, and/or her healthcare decision maker, is aware that it is a billable service, with coverage as determined by her insurance carrier. She provided verbal consent to proceed: Yes, and patient identification was verified. This visit was conducted pursuant to the emergency declaration under the [facility], [address] [place] [address] authority and the [employer] Act. A caregiver was present when appropriate. Ability to conduct physical exam was limited. The patient was located in a state where the provider was credentialed to provide care. --Yonas Saldivar MD on 10/25/2021 at 1:56 PM                10/25/21:  Patient doing well.   Still has mild fatigue. Has noticed increased BP. Now s/p IV iron with venofer 200 mg x 4 weekly doses. Tolerated well. Patient has improvement in ice cravings. No other complaints of bleeding, bruising, or other hematological symptoms. Repeat CBC from 10/12/21 shows marked improvement in Hg to 14.3 g/dl. ECOG Performance Status: (0) Fully active, able to carry on all predisease performance without restriction      ALLERGIES:  No Known Allergies    MEDICATIONS:  Current Outpatient Medications on File Prior to Visit   Medication Sig Dispense Refill    ALPRAZolam (XANAX) 0.5 mg tablet TAKE 1 TABLET BY MOUTH TWICE DAILY as needed for anxiety 180 Tablet 0    methocarbamoL (ROBAXIN) 500 mg tablet Take 1 Tablet by mouth nightly. For tension headaches 30 Tablet 2    ferrous sulfate (SLOW FE) 142 mg (45 mg iron) ER tablet Take 1 Tab by mouth two (2) times a day. 60 Tab 5    gabapentin (NEURONTIN) 100 mg capsule Take 1 Cap by mouth two (2) times a day. Max Daily Amount: 200 mg. 60 Cap 2    meloxicam (MOBIC) 7.5 mg tablet Take 1 Tab by mouth daily. 30 Tab 5    pantoprazole (PROTONIX) 40 mg tablet TAKE 1 TABLET DAILY by mouth 90 Tab 4    lisinopril-hydroCHLOROthiazide (PRINZIDE, ZESTORETIC) 20-25 mg per tablet Take 1 Tab by mouth daily. 30 Tab 2    aspirin/salicylamide/caffeine (BC HEADACHE POWDER PO) Take 1 Package by mouth.  azelastine (ASTELIN) 137 mcg (0.1 %) nasal spray 1 Spray as needed for Rhinitis. Use in each nostril as directed      cromolyn (OPTICROM) 4 % ophthalmic solution Administer 1 Drop to both eyes four (4) times daily. Use in affected eye(s) 10 mL 0     No current facility-administered medications on file prior to visit.        PMH:  Past Medical History:   Diagnosis Date    Chronic pain     Hypertension     Microcytic anemia 3/25/2021    Non-nicotine vapor product user     Psychiatric disorder     anxiety     Past Surgical History:   Procedure Laterality Date    HC LAP BAND ADJUST PROCEDURE  HX BREAST BIOPSY Left     years ago    HX BREAST BIOPSY Right 06/2018    Benign    HX CERVICAL FUSION      HX CHOLECYSTECTOMY      HX GASTRIC BYPASS      HX HYSTERECTOMY      HX ORTHOPAEDIC      foot surgery--bunions--bilateral    HX ORTHOPAEDIC      ACDF    DC REMOVAL GALLBLADDER       Patient Active Problem List   Diagnosis Code    HTN (hypertension) I10    Anxiety F41.9    GERD without esophagitis K21.9    Spondylolisthesis of lumbar region M43.16    S/P lumbar spinal fusion Z98.1    Microcytic anemia D50.9    Monoclonal gammopathy D47.2    Iron deficiency anemia D50.9       SOCIAL AND FAMILY HISTORY:  Social History     Socioeconomic History    Marital status:      Spouse name: Not on file    Number of children: Not on file    Years of education: Not on file    Highest education level: Not on file   Tobacco Use    Smoking status: Current Every Day Smoker     Packs/day: 0.50     Years: 25.00     Pack years: 12.50     Types: Cigarettes    Smokeless tobacco: Never Used   Substance and Sexual Activity    Alcohol use: Yes     Alcohol/week: 0.0 standard drinks     Comment: rare/1-2 per month    Drug use: No    Sexual activity: Yes     Partners: Male     Birth control/protection: None     Social Determinants of Health     Financial Resource Strain:     Difficulty of Paying Living Expenses:    Food Insecurity:     Worried About Running Out of Food in the Last Year:     [address] in the Last Year:    Transportation Needs:     Lack of Transportation (Medical):      Lack of Transportation (Non-Medical):    Physical Activity:     Days of Exercise per Week:     Minutes of Exercise per Session:    Stress:     Feeling of Stress :    Social Connections:     Frequency of Communication with Friends and Family:     Frequency of Social Gatherings with Friends and Family:     Attends Worship Services:     Active Member of Clubs or Organizations:     Attends Club or Organization Meetings:     Marital Status:      family history includes Breast Cancer in her paternal aunt; Breast Cancer (age of onset: 72) in her sister; Diabetes in her father and mother; Heart Disease in her father and mother; Other in her sister. REVIEW OF SYSTEMS:   Review of Systems   Constitutional: Negative for fever and weight loss. HENT: Negative. Negative for nosebleeds. Eyes: Negative. Respiratory: Negative. Negative for cough, hemoptysis and shortness of breath. Cardiovascular: Negative. Negative for chest pain and leg swelling. Gastrointestinal: Negative for abdominal pain, blood in stool, constipation, diarrhea, heartburn, melena, nausea and vomiting. Genitourinary: Negative. Negative for hematuria. Musculoskeletal: Negative. Negative for falls and myalgias. Skin: Negative. Negative for rash. Neurological: Negative. Endo/Heme/Allergies: Negative. PHYSICAL EXAMINATION:  There were no vitals filed for this visit. General: alert, cooperative, no distress   Mental  status: normal mood, behavior, speech, dress, motor activity, and thought processes, able to follow commands   HENT: NCAT   Neck: no visualized mass   Resp: no respiratory distress   Neuro: no gross deficits   Skin: no discoloration or lesions of concern on visible areas   Psychiatric: normal affect, consistent with stated mood, no evidence of hallucinations     DATA REVIEW:     Lab Results   Component Value Date    HGB 14.3 10/12/2021    HCT 43.9 10/12/2021    WBC 5.2 10/12/2021     10/12/2021       No components found for: NEUTOPHILPCT, LYMPHOPCT, MONOPCT, EOSPCT  Lab Results   Component Value Date     10/12/2021    K 4.0 10/12/2021     (H) 10/12/2021    BUN 13 10/12/2021     Lab Results   Component Value Date    AST 12 (L) 10/12/2021    ALT 12 10/12/2021    TBILI 0.3 10/12/2021     Chart from patient's PCP reviewed lab values to detailed in HPI.   Initial iron saturation of only 3%, elevated TIBC, no ferritin was ordered. ASSESSMENT & PLAN:    79-year-old female with a past medical history notable for gastroesophageal reflux disorder, history of gastric ulcers, anxiety, history of spinal stenosis, presents for evaluation of treatment of chronic microcytic anemia that has been refractory to oral iron supplementation. She received IV iron with Injectafer x2. She presents today for follow-up    - # Microcytic Anemia of secondary to iron deficiency anemia  - now s/p venofer x 4 with good improvement  - repeat CBC with iron studies in 6 months. - # Monoclonal Gammopathy of unknown significance   - 0.5 g/dl m-spike. IgA lambda light chain.   - 24 hour urine shows no paraprotein. - No other clinical signs or findings of myeloma   -3-month follow-up shows no evidence of M spike on SPEP. No clinical symptoms.     - repeat SPEP in 6 months         Return visit:  6 mo with labs prior      Bhavna Richter MD  [place] [address] [facility]

## 2021-10-25 NOTE — PROGRESS NOTES
Lydia Oliveira is a 57 y.o. female seeing the provider virtually today for TALON. Pt received IV by Dr. Clotilde Lopez office on 10/5/21, 10/12/21 & 10/19/21. Pt report she has pain behind her left breast; pt report her GI doctor is aware. Pt denies SOB. Pt denies active bleeding. Pt repot she still feels tired. Chief Complaint   Patient presents with    Follow-up     TALON     1. Have you been to the ER, urgent care clinic since your last visit? Hospitalized since your last visit? No    2. Have you seen or consulted any other health care providers outside of the [address] [place] [address] since your last visit? Include any pap smears or colon screening.  No

## 2021-10-28 ENCOUNTER — TELEPHONE (OUTPATIENT)
Dept: ONCOLOGY | Age: 61
End: 2021-10-28

## 2021-10-28 NOTE — TELEPHONE ENCOUNTER
Return call placed to pt. HIPAA verified by two patient identifiers. Pt informed nurse she received a robo call from [phone] asking her to schedule her infusion appt. Pt informed we did not set up an infusion appt for her and advised her to call them back. Pt verbalized understanding and stated she will ignore the call because she tried calling the number back but it asked her to pick her location and she wasn't sure because she didn't need iron. Pt transferred to [facility] to schedule a follow up appt.

## 2021-10-28 NOTE — TELEPHONE ENCOUNTER
Patient called. She is getting an alert to schedule her infusion but was she didn't need anymore (levels were looking good). Please follow up with patient with next steps.

## 2022-01-26 ENCOUNTER — TELEPHONE (OUTPATIENT)
Dept: FAMILY MEDICINE CLINIC | Age: 62
End: 2022-01-26

## 2022-01-26 DIAGNOSIS — F41.9 ANXIETY: ICD-10-CM

## 2022-01-26 RX ORDER — ALPRAZOLAM 0.5 MG/1
TABLET ORAL
Qty: 60 TABLET | Refills: 0 | Status: SHIPPED | OUTPATIENT
Start: 2022-01-26 | End: 2022-03-29 | Stop reason: SDUPTHER

## 2022-01-26 NOTE — TELEPHONE ENCOUNTER
----- Message from Shwetha Thorne sent at 1/26/2022  8:22 AM EST -----  Subject: Refill Request    QUESTIONS  Name of Medication? ALPRAZolam (XANAX) 0.5 mg tablet  Patient-reported dosage and instructions? Patient takes 1 or 2 tablets per   day as needed for anxiety  How many days do you have left? 2  Preferred Pharmacy? [id number]  Pharmacy phone number (if available)? [phone]  Additional Information for Provider? Patient has an appointment on 2/7/22   @ 7:30 am but will be out of this medication before then. Patient would   like this refill called in within the next 2 days as she only has 4 pills   left. Thank you.  ---------------------------------------------------------------------------  --------------  CALL BACK INFO  What is the best way for the office to contact you? OK to leave message on   [employer], OK to respond with electronic message via Webjam portal (only   for patients who have registered Webjam account)  Preferred Call Back Phone Number?  [phone]

## 2022-02-07 ENCOUNTER — OFFICE VISIT (OUTPATIENT)
Dept: FAMILY MEDICINE CLINIC | Age: 62
End: 2022-02-07
Payer: COMMERCIAL

## 2022-02-07 VITALS
DIASTOLIC BLOOD PRESSURE: 80 MMHG | WEIGHT: 159 LBS | SYSTOLIC BLOOD PRESSURE: 164 MMHG | RESPIRATION RATE: 16 BRPM | HEIGHT: 62 IN | BODY MASS INDEX: 29.26 KG/M2 | HEART RATE: 75 BPM | OXYGEN SATURATION: 97 % | TEMPERATURE: 98 F

## 2022-02-07 DIAGNOSIS — Z00.00 WELL ADULT EXAM: Primary | ICD-10-CM

## 2022-02-07 DIAGNOSIS — E61.1 IRON DEFICIENCY: ICD-10-CM

## 2022-02-07 DIAGNOSIS — Z01.419 ENCOUNTER FOR CERVICAL PAP SMEAR WITH PELVIC EXAM: ICD-10-CM

## 2022-02-07 DIAGNOSIS — R07.89 ATYPICAL CHEST PAIN: ICD-10-CM

## 2022-02-07 DIAGNOSIS — Z12.31 VISIT FOR SCREENING MAMMOGRAM: ICD-10-CM

## 2022-02-07 PROCEDURE — 93000 ELECTROCARDIOGRAM COMPLETE: CPT | Performed by: NURSE PRACTITIONER

## 2022-02-07 PROCEDURE — 99396 PREV VISIT EST AGE 40-64: CPT | Performed by: NURSE PRACTITIONER

## 2022-02-07 RX ORDER — AMLODIPINE BESYLATE 5 MG/1
5 TABLET ORAL DAILY
Qty: 30 TABLET | Refills: 2 | Status: SHIPPED | OUTPATIENT
Start: 2022-02-07 | End: 2022-02-28 | Stop reason: SDUPTHER

## 2022-02-07 RX ORDER — FLUTICASONE PROPIONATE 50 MCG
2 SPRAY, SUSPENSION (ML) NASAL DAILY
Qty: 1 EACH | Refills: 5 | Status: SHIPPED | OUTPATIENT
Start: 2022-02-07 | End: 2022-07-25 | Stop reason: SDUPTHER

## 2022-02-07 NOTE — PROGRESS NOTES
Chief Complaint   Patient presents with    Annual Wellness Visit    Breast pain     Pt being seen for left breast pain  -pt states this has been going on for 2mo  -pt denies lumps in breast  -pt states that she also has a heavy feeling in her fingers  -pt states she is still having pain in her tail bone    1. Have you been to the ER, urgent care clinic since your last visit? Hospitalized since your last visit? No    2. Have you seen or consulted any other health care providers outside of the [address] [place] [address] since your last visit? Include any pap smears or colon screening.  No     Pt has no other concerns

## 2022-02-07 NOTE — PROGRESS NOTES
Subjective:   57 y.o. female for Well Woman Check. Her gyne and breast care is done elsewhere by her Ob-Gyne physician. Patient Active Problem List    Diagnosis Date Noted    Iron deficiency anemia 08/31/2021    Monoclonal gammopathy 03/31/2021    Microcytic anemia 03/25/2021    Spondylolisthesis of lumbar region 09/26/2018    S/P lumbar spinal fusion 09/26/2018    GERD without esophagitis 06/03/2015    Anxiety 06/22/2012    HTN (hypertension) 05/02/2012     Current Outpatient Medications   Medication Sig Dispense Refill    fluticasone propionate (FLONASE) 50 mcg/actuation nasal spray 2 Sprays by Both Nostrils route daily. 1 Each 5    amLODIPine (NORVASC) 5 mg tablet Take 1 Tablet by mouth daily. 30 Tablet 2    ALPRAZolam (XANAX) 0.5 mg tablet TAKE 1 TABLET BY MOUTH TWICE DAILY as needed for anxiety 60 Tablet 0    methocarbamoL (ROBAXIN) 500 mg tablet Take 1 Tablet by mouth nightly. For tension headaches 30 Tablet 2    ferrous sulfate (SLOW FE) 142 mg (45 mg iron) ER tablet Take 1 Tab by mouth two (2) times a day. 60 Tab 5    gabapentin (NEURONTIN) 100 mg capsule Take 1 Cap by mouth two (2) times a day. Max Daily Amount: 200 mg. 60 Cap 2    meloxicam (MOBIC) 7.5 mg tablet Take 1 Tab by mouth daily. 30 Tab 5    pantoprazole (PROTONIX) 40 mg tablet TAKE 1 TABLET DAILY by mouth 90 Tab 4    lisinopril-hydroCHLOROthiazide (PRINZIDE, ZESTORETIC) 20-25 mg per tablet Take 1 Tab by mouth daily. 30 Tab 2    aspirin/salicylamide/caffeine (BC HEADACHE POWDER PO) Take 1 Package by mouth.  azelastine (ASTELIN) 137 mcg (0.1 %) nasal spray 1 Spray as needed for Rhinitis. Use in each nostril as directed      cromolyn (OPTICROM) 4 % ophthalmic solution Administer 1 Drop to both eyes four (4) times daily.  Use in affected eye(s) 10 mL 0     Family History   Problem Relation Age of Onset    Diabetes Mother     Heart Disease Mother     Diabetes Father     Heart Disease Father     Other Sister IBS/bowel intestine problems    Breast Cancer Sister 72    Breast Cancer Paternal Aunt         3 paunts, 5 pcousins     Social History     Tobacco Use    Smoking status: Current Every Day Smoker     Packs/day: 0.50     Years: 25.00     Pack years: 12.50     Types: Cigarettes    Smokeless tobacco: Never Used   Substance Use Topics    Alcohol use: Yes     Alcohol/week: 0.0 standard drinks     Comment: rare/1-2 per month             ROS: Feeling generally well. No TIA's or unusual headaches, no dysphagia. No prolonged cough. No dyspnea or chest pain on exertion. No abdominal pain, change in bowel habits, black or bloody stools. No urinary tract symptoms. No new or unusual musculoskeletal symptoms. Specific concerns today: having center of chest pain that radiates to the right; does have htn and is high today. Has been running high at home, on Prinzide daily. Objective: The patient appears well, alert, oriented x 3, in no distress. Visit Vitals  BP (!) 164/80 (BP 1 Location: Left upper arm, BP Patient Position: Sitting)   Pulse 75   Temp 98 °F (36.7 °C) (Oral)   Resp 16   Ht 5' 2\" (1.575 m)   Wt 159 lb (72.1 kg)   SpO2 97%   BMI 29.08 kg/m²     ENT normal.  Neck supple. No adenopathy or thyromegaly. YUVAL. Lungs are clear, good air entry, no wheezes, rhonchi or rales. S1 and S2 normal, no murmurs, regular rate and rhythm. Abdomen soft without tenderness, guarding, mass or organomegaly. Extremities show no edema, normal peripheral pulses. Neurological is normal, no focal findings. Breast and Pelvic exams are deferred. Assessment/Plan:   Well Woman  lose weight, increase physical activity, follow low fat diet, follow low salt diet, routine labs ordered  Encounter Diagnoses   Name Primary?     Well adult exam Yes    Encounter for cervical Pap smear with pelvic exam     Iron deficiency     Visit for screening mammogram     Atypical chest pain      Orders Placed This Encounter    DANIELA MAMMO BI SCREENING INCL CAD    CBC WITH AUTOMATED DIFF    METABOLIC PANEL, COMPREHENSIVE    TSH 3RD GENERATION    LIPID PANEL    IRON PROFILE    AMB POC EKG ROUTINE W/ 12 LEADS, INTER & REP    fluticasone propionate (FLONASE) 50 mcg/actuation nasal spray    amLODIPine (NORVASC) 5 mg tablet     Start Norvasc 5mg a day  EKG with no acute changes, did refer to cardio for eval  Labs updated  Follow-up and Dispositions    · Return for bp check 1 mo. I have discussed the diagnosis with the patient and the intended plan as seen in the above orders. The patient has received an after-visit summary and questions were answered concerning future plans. Patient conveyed understanding of the plan at the time of the visit.     Tiny Mojica, MSN, ANP  2/7/2022

## 2022-02-08 LAB
ALBUMIN SERPL-MCNC: 4.4 G/DL (ref 3.8–4.8)
ALBUMIN/GLOB SERPL: 1.9 {RATIO} (ref 1.2–2.2)
ALP SERPL-CCNC: 119 IU/L (ref 44–121)
ALT SERPL-CCNC: 7 IU/L (ref 0–32)
AST SERPL-CCNC: 13 IU/L (ref 0–40)
BASOPHILS # BLD AUTO: 0 X10E3/UL (ref 0–0.2)
BASOPHILS NFR BLD AUTO: 0 %
BILIRUB SERPL-MCNC: <0.2 MG/DL (ref 0–1.2)
BUN SERPL-MCNC: 11 MG/DL (ref 8–27)
BUN/CREAT SERPL: 17 (ref 12–28)
CALCIUM SERPL-MCNC: 9.6 MG/DL (ref 8.7–10.3)
CHLORIDE SERPL-SCNC: 104 MMOL/L (ref 96–106)
CHOLEST SERPL-MCNC: 253 MG/DL (ref 100–199)
CO2 SERPL-SCNC: 21 MMOL/L (ref 20–29)
CREAT SERPL-MCNC: 0.66 MG/DL (ref 0.57–1)
EOSINOPHIL # BLD AUTO: 0.1 X10E3/UL (ref 0–0.4)
EOSINOPHIL NFR BLD AUTO: 1 %
ERYTHROCYTE [DISTWIDTH] IN BLOOD BY AUTOMATED COUNT: 12.4 % (ref 11.7–15.4)
GLOBULIN SER CALC-MCNC: 2.3 G/DL (ref 1.5–4.5)
GLUCOSE SERPL-MCNC: 75 MG/DL (ref 65–99)
HCT VFR BLD AUTO: 42.1 % (ref 34–46.6)
HDLC SERPL-MCNC: 100 MG/DL
HGB BLD-MCNC: 14.4 G/DL (ref 11.1–15.9)
IMM GRANULOCYTES # BLD AUTO: 0 X10E3/UL (ref 0–0.1)
IMM GRANULOCYTES NFR BLD AUTO: 0 %
IMP & REVIEW OF LAB RESULTS: NORMAL
IRON SATN MFR SERPL: 13 % (ref 15–55)
IRON SERPL-MCNC: 46 UG/DL (ref 27–139)
LDLC SERPL CALC-MCNC: 145 MG/DL (ref 0–99)
LYMPHOCYTES # BLD AUTO: 1.1 X10E3/UL (ref 0.7–3.1)
LYMPHOCYTES NFR BLD AUTO: 21 %
MCH RBC QN AUTO: 33.3 PG (ref 26.6–33)
MCHC RBC AUTO-ENTMCNC: 34.2 G/DL (ref 31.5–35.7)
MCV RBC AUTO: 98 FL (ref 79–97)
MONOCYTES # BLD AUTO: 0.3 X10E3/UL (ref 0.1–0.9)
MONOCYTES NFR BLD AUTO: 7 %
NEUTROPHILS # BLD AUTO: 3.7 X10E3/UL (ref 1.4–7)
NEUTROPHILS NFR BLD AUTO: 71 %
PLATELET # BLD AUTO: 207 X10E3/UL (ref 150–450)
POTASSIUM SERPL-SCNC: 4 MMOL/L (ref 3.5–5.2)
PROT SERPL-MCNC: 6.7 G/DL (ref 6–8.5)
RBC # BLD AUTO: 4.32 X10E6/UL (ref 3.77–5.28)
SODIUM SERPL-SCNC: 141 MMOL/L (ref 134–144)
TIBC SERPL-MCNC: 351 UG/DL (ref 250–450)
TRIGL SERPL-MCNC: 50 MG/DL (ref 0–149)
TSH SERPL DL<=0.005 MIU/L-ACNC: 0.99 UIU/ML (ref 0.45–4.5)
UIBC SERPL-MCNC: 305 UG/DL (ref 118–369)
VLDLC SERPL CALC-MCNC: 8 MG/DL (ref 5–40)
WBC # BLD AUTO: 5.3 X10E3/UL (ref 3.4–10.8)

## 2022-02-13 NOTE — PROGRESS NOTES
Hey there, your labs show that your cholesterol is connie high. You really need to be on a med to get this down. Let me know your thoughts.  Serena Mix

## 2022-02-14 ENCOUNTER — TELEPHONE (OUTPATIENT)
Dept: FAMILY MEDICINE CLINIC | Age: 62
End: 2022-02-14

## 2022-02-14 NOTE — TELEPHONE ENCOUNTER
----- Message from Rupert Irving sent at 2/14/2022  7:33 AM EST -----  Subject: Message to Provider    QUESTIONS  Information for Provider? Patient called wanting to discuss her   cholesterol level results. ---------------------------------------------------------------------------  --------------  Aziza Lopez INFO  What is the best way for the office to contact you? OK to leave message on   voicemail  Preferred Call Back Phone Number? [phone]  ---------------------------------------------------------------------------  --------------  SCRIPT ANSWERS  Relationship to Patient?  Self

## 2022-02-14 NOTE — PROGRESS NOTES
I do hereby attest that this information is true, accurate and complete to the best of my knowledge. I was available for questions and evaluation if needed. Ilana Keller MD  Adult and Pediatric Rheumatology

## 2022-02-16 ENCOUNTER — TELEPHONE (OUTPATIENT)
Dept: FAMILY MEDICINE CLINIC | Age: 62
End: 2022-02-16

## 2022-02-16 NOTE — TELEPHONE ENCOUNTER
----- Message from Aria sent at 2/16/2022  5:00 PM EST -----  Subject: Message to Provider    QUESTIONS  Information for Provider? Pt returned call. Office was closed. Please   contact pt.  ---------------------------------------------------------------------------  --------------  CALL BACK INFO  What is the best way for the office to contact you? OK to leave message on   voicemail  Preferred Call Back Phone Number?  [phone]  ---------------------------------------------------------------------------  --------------  SCRIPT ANSWERS  undefined

## 2022-02-17 ENCOUNTER — TELEPHONE (OUTPATIENT)
Dept: FAMILY MEDICINE CLINIC | Age: 62
End: 2022-02-17

## 2022-02-17 RX ORDER — ROSUVASTATIN CALCIUM 10 MG/1
10 TABLET, COATED ORAL DAILY
Qty: 90 TABLET | Refills: 3 | Status: SHIPPED | OUTPATIENT
Start: 2022-02-17

## 2022-02-17 NOTE — TELEPHONE ENCOUNTER
Called pt in re to her wanting to discuss her cholesterol labs no answer left vm to call office back

## 2022-02-17 NOTE — TELEPHONE ENCOUNTER
Med sent in.  [facility] Fe Add Option For Additional Mediation: No Concentration (Mg/Ml) Of Additional Medication: 2.5 Administered By (Optional): Clyde LI Consent: The risks of atrophy were reviewed with the patient. Total Volume (Ccs): 1.5 Detail Level: Zone Kenalog Preparation: kenalog Concentration (Mg/Ml): 40.0

## 2022-02-17 NOTE — TELEPHONE ENCOUNTER
Spoke with pt, she was calling about her [employer].  She agrees to start a med and would like it sent to pharmacy on file

## 2022-02-21 ENCOUNTER — APPOINTMENT (OUTPATIENT)
Dept: CT IMAGING | Age: 62
End: 2022-02-21
Attending: EMERGENCY MEDICINE
Payer: COMMERCIAL

## 2022-02-21 ENCOUNTER — HOSPITAL ENCOUNTER (EMERGENCY)
Age: 62
Discharge: HOME | End: 2022-02-21
Attending: EMERGENCY MEDICINE
Payer: COMMERCIAL

## 2022-02-21 ENCOUNTER — APPOINTMENT (OUTPATIENT)
Dept: GENERAL RADIOLOGY | Age: 62
End: 2022-02-21
Attending: EMERGENCY MEDICINE
Payer: COMMERCIAL

## 2022-02-21 VITALS
SYSTOLIC BLOOD PRESSURE: 177 MMHG | TEMPERATURE: 97.4 F | OXYGEN SATURATION: 99 % | HEART RATE: 59 BPM | RESPIRATION RATE: 15 BRPM | DIASTOLIC BLOOD PRESSURE: 76 MMHG

## 2022-02-21 DIAGNOSIS — G44.209 TENSION HEADACHE: ICD-10-CM

## 2022-02-21 DIAGNOSIS — R07.9 CHEST PAIN, UNSPECIFIED TYPE: Primary | ICD-10-CM

## 2022-02-21 DIAGNOSIS — R20.2 PARESTHESIA OF LEFT ARM: ICD-10-CM

## 2022-02-21 DIAGNOSIS — G56.22 ULNAR NERVE COMPRESSION, LEFT: ICD-10-CM

## 2022-02-21 LAB
ALBUMIN SERPL-MCNC: 3.8 G/DL (ref 3.5–5)
ALBUMIN/GLOB SERPL: 1.1 {RATIO} (ref 1.1–2.2)
ALP SERPL-CCNC: 120 U/L (ref 45–117)
ALT SERPL-CCNC: 13 U/L (ref 12–78)
ANION GAP SERPL CALC-SCNC: 3 MMOL/L (ref 5–15)
AST SERPL-CCNC: 12 U/L (ref 15–37)
ATRIAL RATE: 68 BPM
BASOPHILS # BLD: 0 K/UL (ref 0–0.1)
BASOPHILS NFR BLD: 1 % (ref 0–1)
BILIRUB SERPL-MCNC: 0.3 MG/DL (ref 0.2–1)
BUN SERPL-MCNC: 14 MG/DL (ref 6–20)
BUN/CREAT SERPL: 20 (ref 12–20)
CALCIUM SERPL-MCNC: 9.4 MG/DL (ref 8.5–10.1)
CALCULATED P AXIS, ECG09: 44 DEGREES
CALCULATED R AXIS, ECG10: 18 DEGREES
CALCULATED T AXIS, ECG11: 39 DEGREES
CHLORIDE SERPL-SCNC: 112 MMOL/L (ref 97–108)
CO2 SERPL-SCNC: 24 MMOL/L (ref 21–32)
COMMENT, HOLDF: NORMAL
CREAT SERPL-MCNC: 0.71 MG/DL (ref 0.55–1.02)
DIAGNOSIS, 93000: NORMAL
DIFFERENTIAL METHOD BLD: ABNORMAL
EOSINOPHIL # BLD: 0 K/UL (ref 0–0.4)
EOSINOPHIL NFR BLD: 1 % (ref 0–7)
ERYTHROCYTE [DISTWIDTH] IN BLOOD BY AUTOMATED COUNT: 14.4 % (ref 11.5–14.5)
GLOBULIN SER CALC-MCNC: 3.4 G/DL (ref 2–4)
GLUCOSE SERPL-MCNC: 82 MG/DL (ref 65–100)
HCT VFR BLD AUTO: 45.6 % (ref 35–47)
HGB BLD-MCNC: 14.8 G/DL (ref 11.5–16)
IMM GRANULOCYTES # BLD AUTO: 0 K/UL (ref 0–0.04)
IMM GRANULOCYTES NFR BLD AUTO: 0 % (ref 0–0.5)
LYMPHOCYTES # BLD: 1.3 K/UL (ref 0.8–3.5)
LYMPHOCYTES NFR BLD: 21 % (ref 12–49)
MCH RBC QN AUTO: 32.7 PG (ref 26–34)
MCHC RBC AUTO-ENTMCNC: 32.5 G/DL (ref 30–36.5)
MCV RBC AUTO: 100.9 FL (ref 80–99)
MONOCYTES # BLD: 0.3 K/UL (ref 0–1)
MONOCYTES NFR BLD: 5 % (ref 5–13)
NEUTS SEG # BLD: 4.5 K/UL (ref 1.8–8)
NEUTS SEG NFR BLD: 72 % (ref 32–75)
NRBC # BLD: 0 K/UL (ref 0–0.01)
NRBC BLD-RTO: 0 PER 100 WBC
P-R INTERVAL, ECG05: 146 MS
PLATELET # BLD AUTO: 280 K/UL (ref 150–400)
PMV BLD AUTO: 10.6 FL (ref 8.9–12.9)
POTASSIUM SERPL-SCNC: 3.9 MMOL/L (ref 3.5–5.1)
PROT SERPL-MCNC: 7.2 G/DL (ref 6.4–8.2)
Q-T INTERVAL, ECG07: 416 MS
QRS DURATION, ECG06: 72 MS
QTC CALCULATION (BEZET), ECG08: 442 MS
RBC # BLD AUTO: 4.52 M/UL (ref 3.8–5.2)
SAMPLES BEING HELD,HOLD: NORMAL
SODIUM SERPL-SCNC: 139 MMOL/L (ref 136–145)
TROPONIN-HIGH SENSITIVITY: 10 NG/L (ref 0–51)
TROPONIN-HIGH SENSITIVITY: 12 NG/L (ref 0–51)
VENTRICULAR RATE, ECG03: 68 BPM
WBC # BLD AUTO: 6.2 K/UL (ref 3.6–11)

## 2022-02-21 PROCEDURE — 99285 EMERGENCY DEPT VISIT HI MDM: CPT

## 2022-02-21 PROCEDURE — 70450 CT HEAD/BRAIN W/O DYE: CPT

## 2022-02-21 PROCEDURE — 74011250637 HC RX REV CODE- 250/637: Performed by: EMERGENCY MEDICINE

## 2022-02-21 PROCEDURE — 80053 COMPREHEN METABOLIC PANEL: CPT

## 2022-02-21 PROCEDURE — 84484 ASSAY OF TROPONIN QUANT: CPT

## 2022-02-21 PROCEDURE — 93005 ELECTROCARDIOGRAM TRACING: CPT

## 2022-02-21 PROCEDURE — 85025 COMPLETE CBC W/AUTO DIFF WBC: CPT

## 2022-02-21 PROCEDURE — 96374 THER/PROPH/DIAG INJ IV PUSH: CPT

## 2022-02-21 PROCEDURE — 36415 COLL VENOUS BLD VENIPUNCTURE: CPT

## 2022-02-21 PROCEDURE — 71046 X-RAY EXAM CHEST 2 VIEWS: CPT

## 2022-02-21 PROCEDURE — 74011250636 HC RX REV CODE- 250/636: Performed by: EMERGENCY MEDICINE

## 2022-02-21 RX ORDER — HYDROCHLOROTHIAZIDE 25 MG/1
25 TABLET ORAL
Status: COMPLETED | OUTPATIENT
Start: 2022-02-21 | End: 2022-02-21

## 2022-02-21 RX ORDER — ACETAMINOPHEN 500 MG
1000 TABLET ORAL
Status: COMPLETED | OUTPATIENT
Start: 2022-02-21 | End: 2022-02-21

## 2022-02-21 RX ORDER — ASPIRIN 81 MG/1
81 TABLET ORAL DAILY
Qty: 30 TABLET | Refills: 0 | Status: SHIPPED | OUTPATIENT
Start: 2022-02-21 | End: 2022-03-23

## 2022-02-21 RX ORDER — LISINOPRIL 10 MG/1
20 TABLET ORAL
Status: COMPLETED | OUTPATIENT
Start: 2022-02-21 | End: 2022-02-21

## 2022-02-21 RX ORDER — METHYLPREDNISOLONE 4 MG/1
TABLET ORAL
Qty: 1 DOSE PACK | Refills: 0 | Status: SHIPPED | OUTPATIENT
Start: 2022-02-21 | End: 2022-03-30 | Stop reason: ALTCHOICE

## 2022-02-21 RX ORDER — KETOROLAC TROMETHAMINE 30 MG/ML
15 INJECTION, SOLUTION INTRAMUSCULAR; INTRAVENOUS
Status: COMPLETED | OUTPATIENT
Start: 2022-02-21 | End: 2022-02-21

## 2022-02-21 RX ORDER — AMLODIPINE BESYLATE 5 MG/1
5 TABLET ORAL
Status: COMPLETED | OUTPATIENT
Start: 2022-02-21 | End: 2022-02-21

## 2022-02-21 RX ORDER — ASPIRIN 325 MG
325 TABLET ORAL
Status: COMPLETED | OUTPATIENT
Start: 2022-02-21 | End: 2022-02-21

## 2022-02-21 RX ADMIN — AMLODIPINE BESYLATE 5 MG: 5 TABLET ORAL at 14:11

## 2022-02-21 RX ADMIN — ASPIRIN 325 MG ORAL TABLET 325 MG: 325 PILL ORAL at 13:18

## 2022-02-21 RX ADMIN — KETOROLAC TROMETHAMINE 15 MG: 30 INJECTION, SOLUTION INTRAMUSCULAR; INTRAVENOUS at 13:18

## 2022-02-21 RX ADMIN — HYDROCHLOROTHIAZIDE 25 MG: 25 TABLET ORAL at 14:11

## 2022-02-21 RX ADMIN — LISINOPRIL 20 MG: 10 TABLET ORAL at 13:18

## 2022-02-21 RX ADMIN — ACETAMINOPHEN 1000 MG: 325 TABLET ORAL at 13:18

## 2022-02-21 NOTE — ED NOTES
60-year-old female received in turnover pending delta troponin. Repeat troponin is reassuring without significant medical change. Will discharge with symptomatic treatment as discussed in signout. Follow-up with primary care, return if needed.

## 2022-02-21 NOTE — ED TRIAGE NOTES
Triage: Pt arrives ambulatory from home with CC of chest pain that radiates into her left arm. She denies SOB. She has had this pain on and off for several months. She saw her PCP who did an ekg that the patient reports was normal. She was referred to cardiology but cannot get in to the office until 3/9.

## 2022-02-21 NOTE — ED PROVIDER NOTES
Pending outpatient follow up with Dr Darby Fleischer of cardiology. The history is provided by the patient. Chest Pain (Angina)   This is a new problem. Episode onset: a few months ago. The problem has not changed since onset. The pain is present in the left side. The pain is mild. The quality of the pain is described as sharp. Radiates to: left arm paresthesia, heaviness. Pertinent negatives include no cough, no fever, no irregular heartbeat, no lower extremity edema, no palpitations, no shortness of breath and no vomiting. She has tried nothing for the symptoms. Risk factors include hypertension and dyslipidemia.         Past Medical History:   Diagnosis Date    Chronic pain     Hypertension     Microcytic anemia 3/25/2021    Non-nicotine vapor product user     Psychiatric disorder     anxiety       Past Surgical History:   Procedure Laterality Date    HC LAP BAND ADJUST PROCEDURE      HX BREAST BIOPSY Left     years ago    HX BREAST BIOPSY Right 06/2018    Benign    HX CERVICAL FUSION      HX CHOLECYSTECTOMY      HX GASTRIC BYPASS      HX HYSTERECTOMY      HX ORTHOPAEDIC      foot surgery--bunions--bilateral    HX ORTHOPAEDIC      ACDF    [place] REMOVAL GALLBLADDER           Family History:   Problem Relation Age of Onset    Diabetes Mother     Heart Disease Mother     Diabetes Father     Heart Disease Father     Other Sister         IBS/bowel intestine problems    Breast Cancer Sister 72    Breast Cancer Paternal Aunt         3 paunts, 5 pcousins       Social History     Socioeconomic History    Marital status:      Spouse name: Not on file    Number of children: Not on file    Years of education: Not on file    Highest education level: Not on file   Occupational History    Not on file   Tobacco Use    Smoking status: Current Every Day Smoker     Packs/day: 0.50     Years: 25.00     Pack years: 12.50     Types: Cigarettes    Smokeless tobacco: Never Used   Substance and Sexual Activity  Alcohol use: Yes     Alcohol/week: 0.0 standard drinks     Comment: rare/1-2 per month    Drug use: No    Sexual activity: Yes     Partners: Male     Birth control/protection: None   Other Topics Concern    Not on file   Social History Narrative    Not on file     Social Determinants of Health     Financial Resource Strain:     Difficulty of Paying Living Expenses: Not on file   Food Insecurity:     Worried About Running Out of Food in the Last Year: Not on file    Arben of Food in the Last Year: Not on file   Transportation Needs:     Lack of Transportation (Medical): Not on file    Lack of Transportation (Non-Medical): Not on file   Physical Activity:     Days of Exercise per Week: Not on file    Minutes of Exercise per Session: Not on file   Stress:     Feeling of Stress : Not on file   Social Connections:     Frequency of Communication with Friends and Family: Not on file    Frequency of Social Gatherings with Friends and Family: Not on file    Attends Yarsani Services: Not on file    Active Member of [address] [place] [address] or Organizations: Not on file    Attends Club or Organization Meetings: Not on file    Marital Status: Not on file   Intimate Partner Violence:     Fear of Current or Ex-Partner: Not on file    Emotionally Abused: Not on file    Physically Abused: Not on file    Sexually Abused: Not on file   Housing Stability:     Unable to Pay for Housing in the Last Year: Not on file    Number of [place] in the Last Year: Not on file    Unstable Housing in the Last Year: Not on file         ALLERGIES: Patient has no known allergies. Review of Systems   Constitutional: Negative for fever. Respiratory: Negative for cough and shortness of breath. Cardiovascular: Positive for chest pain. Negative for palpitations. Gastrointestinal: Negative for vomiting.    Neurological:        Tingling of left arm especially past the elbow   All other systems reviewed and are negative. Vitals:    02/21/22 1021 02/21/22 1105   BP: (!) 176/86 (!) 203/94   Pulse: 80 73   Resp: 16 17   Temp: 98.2 °F (36.8 °C) 97.4 °F (36.3 °C)   SpO2: 100% 100%            Physical Exam  Vitals and nursing note reviewed. Constitutional:       General: She is not in acute distress. Appearance: She is well-developed. HENT:      Head: Normocephalic and atraumatic. Eyes:      Conjunctiva/sclera: Conjunctivae normal.   Cardiovascular:      Rate and Rhythm: Normal rate and regular rhythm. Heart sounds: Normal heart sounds. Pulmonary:      Effort: Pulmonary effort is normal. No respiratory distress. Breath sounds: Normal breath sounds. Abdominal:      General: There is no distension. Musculoskeletal:         General: No deformity. Normal range of motion. Cervical back: Neck supple. Skin:     General: Skin is warm and dry. Neurological:      Mental Status: She is alert. Cranial Nerves: No cranial nerve deficit. Sensory: Sensation is intact. Motor: Motor function is intact. Psychiatric:         Behavior: Behavior normal.          [facility]  ED Course as of 02/21/22 1250   Mon Feb 21, 2022   1057 EKG 16073: Rate 68, Normal sinus rhythm, No ST segment or T wave abnormalities. Normal EKG. [DK]      ED Course User Index  [DK] Mariela Prajapati MD     16-year-old female presents with chest pain that has been intermittent for several months. She has been evaluated by her PCP who performed EKG which was reportedly normal.  She is pending an outpatient follow-up with Dr. Diya Lemons of cardiology. No evidence of ischemia on EKG here with serial troponins that are negative. Her pain is highly atypical.  Notably she has some tingling in her left arm and she does admit that she rests on her left arm at work frequently. Symptoms can be reproduced by percussion of the lateral elbow suggesting possible ulnar nerve entrapment. Will treat empirically with oral steroid therapy.   She can start taking a daily aspirin and call cardiology to see if they can expedite her evaluation. No indication for inpatient management currently.     Procedures

## 2022-02-24 ENCOUNTER — TELEPHONE (OUTPATIENT)
Dept: CARDIOLOGY CLINIC | Age: 62
End: 2022-02-24

## 2022-02-24 DIAGNOSIS — K21.9 GERD WITHOUT ESOPHAGITIS: ICD-10-CM

## 2022-02-24 DIAGNOSIS — I10 ESSENTIAL HYPERTENSION: ICD-10-CM

## 2022-02-24 NOTE — TELEPHONE ENCOUNTER
Patient went to the ER Monday 2/21 for chest pain, BP over 200, L arm/hand pain- has a new patient appt on 3/9. Dr Jose Cabrera wanted her to be seen as soon as possible.      Please advise  Patient [phone]

## 2022-02-28 ENCOUNTER — OFFICE VISIT (OUTPATIENT)
Dept: CARDIOLOGY CLINIC | Age: 62
End: 2022-02-28
Payer: COMMERCIAL

## 2022-02-28 VITALS
BODY MASS INDEX: 28.89 KG/M2 | SYSTOLIC BLOOD PRESSURE: 150 MMHG | HEART RATE: 74 BPM | DIASTOLIC BLOOD PRESSURE: 80 MMHG | WEIGHT: 157 LBS | OXYGEN SATURATION: 97 % | HEIGHT: 62 IN

## 2022-02-28 DIAGNOSIS — R07.9 CHEST PAIN, UNSPECIFIED TYPE: Primary | ICD-10-CM

## 2022-02-28 DIAGNOSIS — I10 PRIMARY HYPERTENSION: ICD-10-CM

## 2022-02-28 DIAGNOSIS — F17.200 SMOKER: ICD-10-CM

## 2022-02-28 PROCEDURE — 99214 OFFICE O/P EST MOD 30 MIN: CPT | Performed by: SPECIALIST

## 2022-02-28 RX ORDER — AMLODIPINE BESYLATE 10 MG/1
10 TABLET ORAL DAILY
Qty: 90 TABLET | Refills: 3 | Status: SHIPPED | OUTPATIENT
Start: 2022-02-28 | End: 2022-10-19

## 2022-02-28 NOTE — PATIENT INSTRUCTIONS
Stopping Smoking: Care Instructions  Your Care Instructions     Cigarette smokers crave the nicotine in cigarettes. Giving it up is much harder than simply changing a habit. Your body has to stop craving the nicotine. It is hard to quit, but you can do it. There are many tools that people use to quit smoking. You may find that combining tools works best for you. There are several steps to quitting. First you get ready to quit. Then you get support to help you. After that, you learn new skills and behaviors to become a nonsmoker. For many people, a necessary step is getting and using medicine. Your doctor will help you set up the plan that best meets your needs. You may want to attend a smoking cessation program to help you quit smoking. When you choose a program, look for one that has proven success. Ask your doctor for ideas. You will greatly increase your chances of success if you take medicine as well as get counseling or join a cessation program.  Some of the changes you feel when you first quit tobacco are uncomfortable. Your body will miss the nicotine at first, and you may feel short-tempered and grumpy. You may have trouble sleeping or concentrating. Medicine can help you deal with these symptoms. You may struggle with changing your smoking habits and rituals. The last step is the tricky one: Be prepared for the smoking urge to continue for a time. This is a lot to deal with, but keep at it. You will feel better. Follow-up care is a key part of your treatment and safety. Be sure to make and go to all appointments, and call your doctor if you are having problems. It's also a good idea to know your test results and keep a list of the medicines you take. How can you care for yourself at home? · Ask your family, friends, and coworkers for support. You have a better chance of quitting if you have help and support.   · Join a support group, such as Nicotine Anonymous, for people who are trying to quit smoking. · Consider signing up for a smoking cessation program, such as the American Lung Association's Freedom from Smoking program.  · Get text messaging support. Go to the website at www.smokefree. gov to sign up for the [facility] program.  · Set a quit date. Pick your date carefully so that it is not right in the middle of a big deadline or stressful time. Once you quit, do not even take a puff. Get rid of all ashtrays and lighters after your last cigarette. Clean your house and your clothes so that they do not smell of smoke. · Learn how to be a nonsmoker. Think about ways you can avoid those things that make you reach for a cigarette. ? Avoid situations that put you at greatest risk for smoking. For some people, it is hard to have a drink with friends without smoking. For others, they might skip a coffee break with coworkers who smoke. ? Change your daily routine. Take a different route to work or eat a meal in a different place. · Cut down on stress. Calm yourself or release tension by doing an activity you enjoy, such as reading a book, taking a hot bath, or gardening. · Talk to your doctor or pharmacist about nicotine replacement therapy, which replaces the nicotine in your body. You still get nicotine but you do not use tobacco. Nicotine replacement products help you slowly reduce the amount of nicotine you need. These products come in several forms, many of them available over-the-counter:  ? Nicotine patches  ? Nicotine gum and lozenges  ? Nicotine inhaler  · Ask your doctor about bupropion (Wellbutrin) or varenicline (Chantix), which are prescription medicines. They do not contain nicotine. They help you by reducing withdrawal symptoms, such as stress and anxiety. · Some people find hypnosis, acupuncture, and massage helpful for ending the smoking habit. · Eat a healthy diet and get regular exercise. Having healthy habits will help your body move past its craving for nicotine.   · Be prepared to keep trying. Most people are not successful the first few times they try to quit. Do not get mad at yourself if you smoke again. Make a list of things you learned and think about when you want to try again, such as next week, next month, or next year. Where can you learn more? Go to http://www.gray.com/  Enter [id number] in the search box to learn more about \"Stopping Smoking: Care Instructions. \"  Current as of: February 11, 2021               Content Version: 13.0  © 6043-6594 [employer]. Care instructions adapted under license by [employer] (which disclaims liability or warranty for this information). If you have questions about a medical condition or this instruction, always ask your healthcare professional. [address] any warranty or liability for your use of this information. High Blood Pressure: Care Instructions  Overview     It's normal for blood pressure to go up and down throughout the day. But if it stays up, you have high blood pressure. Another name for high blood pressure is hypertension. Despite what a lot of people think, high blood pressure usually doesn't cause headaches or make you feel dizzy or lightheaded. It usually has no symptoms. But it does increase your risk of stroke, heart attack, and other problems. You and your doctor will talk about your risks of these problems based on your blood pressure. Your doctor will give you a goal for your blood pressure. Your goal will be based on your health and your age. Lifestyle changes, such as eating healthy and being active, are always important to help lower blood pressure. You might also take medicine to reach your blood pressure goal.  Follow-up care is a key part of your treatment and safety. Be sure to make and go to all appointments, and call your doctor if you are having problems.  It's also a good idea to know your test results and keep a list of the medicines you take. How can you care for yourself at home? Medical treatment  · If you stop taking your medicine, your blood pressure will go back up. You may take one or more types of medicine to lower your blood pressure. Be safe with medicines. Take your medicine exactly as prescribed. Call your doctor if you think you are having a problem with your medicine. · Talk to your doctor before you start taking aspirin every day. Aspirin can help certain people lower their risk of a heart attack or stroke. But taking aspirin isn't right for everyone, because it can cause serious bleeding. · See your doctor regularly. You may need to see the doctor more often at first or until your blood pressure comes down. · If you are taking blood pressure medicine, talk to your doctor before you take decongestants or anti-inflammatory medicine, such as ibuprofen. Some of these medicines can raise blood pressure. · Learn how to check your blood pressure at home. Lifestyle changes  · Stay at a healthy weight. This is especially important if you put on weight around the waist. Losing even 10 pounds can help you lower your blood pressure. · If your doctor recommends it, get more exercise. Walking is a good choice. Bit by bit, increase the amount you walk every day. Try for at least 30 minutes on most days of the week. You also may want to swim, bike, or do other activities. · Avoid or limit alcohol. Talk to your doctor about whether you can drink any alcohol. · Try to limit how much sodium you eat to less than 2,300 milligrams (mg) a day. Your doctor may ask you to try to eat less than 1,500 mg a day. · Eat plenty of fruits (such as bananas and oranges), vegetables, legumes, whole grains, and low-fat dairy products. · Lower the amount of saturated fat in your diet. Saturated fat is found in animal products such as milk, cheese, and meat.  Limiting these foods may help you lose weight and also lower your risk for heart disease. · Do not smoke. Smoking increases your risk for heart attack and stroke. If you need help quitting, talk to your doctor about stop-smoking programs and medicines. These can increase your chances of quitting for good. When should you call for help? Call 911  anytime you think you may need emergency care. This may mean having symptoms that suggest that your blood pressure is causing a serious heart or blood vessel problem. Your blood pressure may be over 180/120. For example, call 911 if:    · You have symptoms of a heart attack. These may include:  ? Chest pain or pressure, or a strange feeling in the chest.  ? Sweating. ? Shortness of breath. ? Nausea or vomiting. ? Pain, pressure, or a strange feeling in the back, neck, jaw, or upper belly or in one or both shoulders or arms. ? Lightheadedness or sudden weakness. ? A fast or irregular heartbeat.     · You have symptoms of a stroke. These may include:  ? Sudden numbness, tingling, weakness, or loss of movement in your face, arm, or leg, especially on only one side of your body. ? Sudden vision changes. ? Sudden trouble speaking. ? Sudden confusion or trouble understanding simple statements. ? Sudden problems with walking or balance. ? A sudden, severe headache that is different from past headaches.     · You have severe back or belly pain. Do not wait until your blood pressure comes down on its own. Get help right away. Call your doctor now or seek immediate care if:    · Your blood pressure is much higher than normal (such as 180/120 or higher), but you don't have symptoms.     · You think high blood pressure is causing symptoms, such as:  ? Severe headache.  ? Blurry vision. Watch closely for changes in your health, and be sure to contact your doctor if:    · Your blood pressure measures higher than your doctor recommends at least 2 times.  That means the top number is higher or the bottom number is higher, or both.     · You think you may be having side effects from your blood pressure medicine. Where can you learn more? Go to http://www.gray.com/  Enter [id number] in the search box to learn more about \"High Blood Pressure: Care Instructions. \"  Current as of: April 29, 2021               Content Version: 13.0  © 3083-1457 [employer]. Care instructions adapted under license by [employer] (which disclaims liability or warranty for this information). If you have questions about a medical condition or this instruction, always ask your healthcare professional. [address] any warranty or liability for your use of this information. DASH Diet: Care Instructions  Your Care Instructions     The DASH diet is an eating plan that can help lower your blood pressure. DASH stands for Dietary Approaches to Stop Hypertension. Hypertension is high blood pressure. The DASH diet focuses on eating foods that are high in calcium, potassium, and magnesium. These nutrients can lower blood pressure. The foods that are highest in these nutrients are fruits, vegetables, low-fat dairy products, nuts, seeds, and legumes. But taking calcium, potassium, and magnesium supplements instead of eating foods that are high in those nutrients does not have the same effect. The DASH diet also includes whole grains, fish, and poultry. The DASH diet is one of several lifestyle changes your doctor may recommend to lower your high blood pressure. Your doctor may also want you to decrease the amount of sodium in your diet. Lowering sodium while following the DASH diet can lower blood pressure even further than just the DASH diet alone. Follow-up care is a key part of your treatment and safety. Be sure to make and go to all appointments, and call your doctor if you are having problems. It's also a good idea to know your test results and keep a list of the medicines you take.   How can you care for yourself at home? Following the DASH diet  · Eat 4 to 5 servings of fruit each day. A serving is 1 medium-sized piece of fruit, ½ cup chopped or canned fruit, 1/4 cup dried fruit, or 4 ounces (½ cup) of fruit juice. Choose fruit more often than fruit juice. · Eat 4 to 5 servings of vegetables each day. A serving is 1 cup of lettuce or raw leafy vegetables, ½ cup of chopped or cooked vegetables, or 4 ounces (½ cup) of vegetable juice. Choose vegetables more often than vegetable juice. · Get 2 to 3 servings of low-fat and fat-free dairy each day. A serving is 8 ounces of milk, 1 cup of yogurt, or 1 ½ ounces of cheese. · Eat 6 to 8 servings of grains each day. A serving is 1 slice of bread, 1 ounce of dry cereal, or ½ cup of cooked rice, pasta, or cooked cereal. Try to choose whole-grain products as much as possible. · Limit lean meat, poultry, and fish to 2 servings each day. A serving is 3 ounces, about the size of a deck of cards. · Eat 4 to 5 servings of nuts, seeds, and legumes (cooked dried beans, lentils, and split peas) each week. A serving is 1/3 cup of nuts, 2 tablespoons of seeds, or ½ cup of cooked beans or peas. · Limit fats and oils to 2 to 3 servings each day. A serving is 1 teaspoon of vegetable oil or 2 tablespoons of salad dressing. · Limit sweets and added sugars to 5 servings or less a week. A serving is 1 tablespoon jelly or jam, ½ cup sorbet, or 1 cup of lemonade. · Eat less than 2,300 milligrams (mg) of sodium a day. If you limit your sodium to 1,500 mg a day, you can lower your blood pressure even more. · Be aware that all of these are the suggested number of servings for people who eat 1,800 to 2,000 calories a day. Your recommended number of servings may be different if you need more or fewer calories. Tips for success  · Start small. Do not try to make dramatic changes to your diet all at once.  You might feel that you are missing out on your favorite foods and then be more likely to not follow the plan. Make small changes, and stick with them. Once those changes become habit, add a few more changes. · Try some of the following:  ? Make it a goal to eat a fruit or vegetable at every meal and at snacks. This will make it easy to get the recommended amount of fruits and vegetables each day. ? Try yogurt topped with fruit and nuts for a snack or healthy dessert. ? Add lettuce, tomato, cucumber, and onion to sandwiches. ? Combine a ready-made pizza crust with low-fat mozzarella cheese and lots of vegetable toppings. Try using tomatoes, squash, spinach, broccoli, carrots, cauliflower, and onions. ? Have a variety of cut-up vegetables with a low-fat dip as an appetizer instead of chips and dip. ? Sprinkle sunflower seeds or chopped almonds over salads. Or try adding chopped walnuts or almonds to cooked vegetables. ? Try some vegetarian meals using beans and peas. Add garbanzo or kidney beans to salads. Make burritos and tacos with mashed candelario beans or black beans. Where can you learn more? Go to http://www.[employer].com/  Enter H967 in the search box to learn more about \"DASH Diet: Care Instructions. \"  Current as of: April 29, 2021               Content Version: 13.0  © 2417-7222 [employer]. Care instructions adapted under license by [employer] (which disclaims liability or warranty for this information). If you have questions about a medical condition or this instruction, always ask your healthcare professional. [place] [address] any warranty or liability for your use of this information.

## 2022-02-28 NOTE — PROGRESS NOTES
Orders for [facility] (try walking first) and echo when possible. See [facility] in 1 month.     per Dr. Jose Wilson VO.   Dx: cp

## 2022-02-28 NOTE — PROGRESS NOTES
Melba Hilton MD. [facility]              Patient: Andres Weber  : 1960      Today's Date: 2022          HISTORY OF PRESENT ILLNESS:     History of Present Illness:  Referred for chest pain. Has had on-off chest pain past 3 months - random - burning - lasts a few min - non exertional.   Was in ED 22 and workup there was OK. BP also has been high past few months. Steroids started in ED visit recently. Also with some esophageal issues - had an EGD. PAST MEDICAL HISTORY:     Past Medical History:   Diagnosis Date    Anxiety     Chronic pain     Dyslipidemia     H/O spinal fusion     Hypertension     Microcytic anemia 3/25/2021    Non-nicotine vapor product user     Obesity     s/p weight loss surgery     Psychiatric disorder     anxiety    Smoker        Past Surgical History:   Procedure Laterality Date    HC LAP BAND ADJUST PROCEDURE      HX BREAST BIOPSY Left     years ago    HX BREAST BIOPSY Right 2018    Benign    HX CERVICAL FUSION      HX CHOLECYSTECTOMY      HX GASTRIC BYPASS      HX HYSTERECTOMY      HX ORTHOPAEDIC      foot surgery--bunions--bilateral    HX ORTHOPAEDIC      ACDF    OH REMOVAL GALLBLADDER           MEDICATIONS:     Current Outpatient Medications   Medication Sig Dispense Refill    aspirin delayed-release 81 mg tablet Take 1 Tablet by mouth daily for 30 days. 30 Tablet 0    methylPREDNISolone (MEDROL DOSEPACK) 4 mg tablet Follow package instructions 1 Dose Pack 0    rosuvastatin (CRESTOR) 10 mg tablet Take 1 Tablet by mouth daily. 90 Tablet 3    fluticasone propionate (FLONASE) 50 mcg/actuation nasal spray 2 Sprays by Both Nostrils route daily. 1 Each 5    amLODIPine (NORVASC) 5 mg tablet Take 1 Tablet by mouth daily. 30 Tablet 2    ALPRAZolam (XANAX) 0.5 mg tablet TAKE 1 TABLET BY MOUTH TWICE DAILY as needed for anxiety 60 Tablet 0    methocarbamoL (ROBAXIN) 500 mg tablet Take 1 Tablet by mouth nightly.  For tension headaches 30 Tablet 2    gabapentin (NEURONTIN) 100 mg capsule Take 1 Cap by mouth two (2) times a day. Max Daily Amount: 200 mg. 60 Cap 2    meloxicam (MOBIC) 7.5 mg tablet Take 1 Tab by mouth daily. 30 Tab 5    pantoprazole (PROTONIX) 40 mg tablet TAKE 1 TABLET DAILY by mouth 90 Tab 4    lisinopril-hydroCHLOROthiazide (PRINZIDE, ZESTORETIC) 20-25 mg per tablet Take 1 Tab by mouth daily. 30 Tab 2    aspirin/salicylamide/caffeine (BC HEADACHE POWDER PO) Take 1 Package by mouth.  azelastine (ASTELIN) 137 mcg (0.1 %) nasal spray 1 Spray as needed for Rhinitis. Use in each nostril as directed      cromolyn (OPTICROM) 4 % ophthalmic solution Administer 1 Drop to both eyes four (4) times daily. Use in affected eye(s) 10 mL 0    ferrous sulfate (SLOW FE) 142 mg (45 mg iron) ER tablet Take 1 Tab by mouth two (2) times a day. (Patient not taking: Reported on 2/28/2022) 60 Tab 5       No Known Allergies          SOCIAL HISTORY:     Social History     Tobacco Use    Smoking status: Current Every Day Smoker     Packs/day: 0.50     Years: 25.00     Pack years: 12.50     Types: Cigarettes    Smokeless tobacco: Never Used   Substance Use Topics    Alcohol use: Yes     Alcohol/week: 0.0 standard drinks     Comment: rare/1-2 per month    Drug use: No         FAMILY HISTORY:     Family History   Problem Relation Age of Onset    Diabetes Mother     Heart Disease Mother     Diabetes Father     Heart Disease Father     Other Sister         IBS/bowel intestine problems    Breast Cancer Sister 72    Breast Cancer Paternal Aunt         3 paunts, 5 pcousins           REVIEW OF SYMPTOMS:     Review of Symptoms:  Constitutional: Negative for fever, chills  HEENT: Negative for nosebleeds, tinnitus, and vision changes. Respiratory: Negative for cough, wheezing  Cardiovascular: Negative for orthopnea, claudication, syncope, and PND. Gastrointestinal: Negative for abdominal pain, diarrhea, melena.    Genitourinary: Negative for dysuria  Musculoskeletal: Negative for myalgias. Skin: Negative for rash  Heme: No problems bleeding. Neurological: Negative for speech change and focal weakness. PHYSICAL EXAM:     Physical Exam:  Visit Vitals  BP (!) 150/80 (BP 1 Location: Left upper arm, BP Patient Position: Sitting, BP Cuff Size: Adult)   Pulse 74   Ht 5' 2\" (1.575 m)   Wt 157 lb (71.2 kg)   SpO2 97%   BMI 28.72 kg/m²     Patient appears generally well, mood and affect are appropriate and pleasant. HEENT:  Hearing intact, non-icteric, normocephalic, atraumatic. Neck Exam: Supple, No JVD or carotid bruits. Lung Exam: Clear to auscultation, even breath sounds. Cardiac Exam: Regular rate and rhythm with 2/6 systolic murmur   Abdomen: Soft, non-tender, normal bowel sounds  Extremities: Moves all ext well. No lower extremity edema. MSKTL: Overall good ROM ext  Skin: No significant rashes  Vascular: palpable dorsalis pedis pulses bilaterally. Psych: Appropriate affect  Neuro - Grossly intact      LABS / OTHER STUDIES reviewed:     Lab Results   Component Value Date/Time    Sodium 139 02/21/2022 10:54 AM    Potassium 3.9 02/21/2022 10:54 AM    Chloride 112 (H) 02/21/2022 10:54 AM    CO2 24 02/21/2022 10:54 AM    Anion gap 3 (L) 02/21/2022 10:54 AM    Glucose 82 02/21/2022 10:54 AM    BUN 14 02/21/2022 10:54 AM    Creatinine 0.71 02/21/2022 10:54 AM    BUN/Creatinine ratio 20 02/21/2022 10:54 AM    GFR est AA >60 02/21/2022 10:54 AM    GFR est non-AA >60 02/21/2022 10:54 AM    Calcium 9.4 02/21/2022 10:54 AM    Bilirubin, total 0.3 02/21/2022 10:54 AM    Alk.  phosphatase 120 (H) 02/21/2022 10:54 AM    Protein, total 7.2 02/21/2022 10:54 AM    Albumin 3.8 02/21/2022 10:54 AM    Globulin 3.4 02/21/2022 10:54 AM    A-G Ratio 1.1 02/21/2022 10:54 AM    ALT (SGPT) 13 02/21/2022 10:54 AM    AST (SGOT) 12 (L) 02/21/2022 10:54 AM     Lab Results   Component Value Date/Time    WBC 6.2 02/21/2022 10:54 AM    HGB 14.8 02/21/2022 10:54 AM    HCT 45.6 02/21/2022 10:54 AM    PLATELET 957 02/96/3952 10:54 AM    .9 (H) 02/21/2022 10:54 AM       Lab Results   Component Value Date/Time    Cholesterol, total 253 (H) 02/07/2022 12:00 AM    HDL Cholesterol 100 02/07/2022 12:00 AM    LDL, calculated 145 (H) 02/07/2022 12:00 AM    LDL, calculated 103.4 (H) 03/02/2021 09:19 AM    VLDL, calculated 8 02/07/2022 12:00 AM    VLDL, calculated 7.6 03/02/2021 09:19 AM    Triglyceride 50 02/07/2022 12:00 AM    CHOL/HDL Ratio 2.1 03/02/2021 09:19 AM       Lab Results   Component Value Date/Time    TSH 0.992 02/07/2022 12:00 AM           CARDIAC DIAGNOSTICS:     Cardiac Evaluation Includes:  I reviewed the results below. EKG 2/7/22 - NSR, possible LAE  EKG 2/22/22 - NSR, possible LAE  I independently viewed and interpreted the test image(s) myself. Echo 5/12 - LVEF 55-60%  CXR 2/21/22 - clear       ASSESSMENT AND PLAN:     Assessment and Plan:    1) Atypical CP   - Will check an echo and lexiscan cardiolite (will try walking first) (back issues limit walking)     2) CV risk   - she is at high risk long term with her smoking and FH  - Needs risk factor control   - Agree with a statin and ASA     3) HTN  - BP remains high ---> increase amlodipine to 10 mg  - cont other meds     4) Dyslipidemia   - she recently started a statin Feb 2022    4) Smoking cessation discussed     5) Esophageal issues   - seeing Dr. Vertis Oppenheim - had an EGD    6) Anemia   - seeing hematology     7) Phone FU. See NP in one month. Retiring in March [facility] Birth Certificates). Anuj Oseguera MD, [address] [facility]  [address] [facility], [facility] [address]. [address], [facility]. Filomena Ozuna.  [place], [address] [place] [address]  Ph: [phone]   Ph [phone]      ADDENDUM   3/22/2022    Echo 3/21/22 - LVEF 61%, LAE, AV sclerosis. Interatrial Septum: The septum is bowing into the RA. IVC diameter is greater than 21 mm and decreases less than 50% during inspiration; therefore the estimated right atrial pressure is elevated (~15 mmHg). Exercise cardiolite 3/21/22 - walked 4 min (7 METS), no CP or ischemic EKG changes. LVEF 70%.        Will call        ADDENDUM   3/24/2022  I called and left a VM -- can discuss further at next visit

## 2022-02-28 NOTE — PROGRESS NOTES
Jhonathan De Guzman is a 57 y.o. female    Visit Vitals  BP (!) 150/80 (BP 1 Location: Left upper arm, BP Patient Position: Sitting, BP Cuff Size: Adult)   Pulse 74   Ht 5' 2\" (1.575 m)   Wt 157 lb (71.2 kg)   SpO2 97%   BMI 28.72 kg/m²       Chief Complaint   Patient presents with    Chest Pain    Hypertension       Chest pain YES  SOB NO  Dizziness NO  Swelling NO  Recent hospital visit [facility], CHEST PAIN, HTN  Refills NO  COVID VACCINE STATUS YES  HAD COVID?  NO    HANDS AND FEET TINGLE

## 2022-03-14 RX ORDER — LISINOPRIL AND HYDROCHLOROTHIAZIDE 20; 25 MG/1; MG/1
1 TABLET ORAL DAILY
Qty: 30 TABLET | Refills: 2 | Status: SHIPPED | OUTPATIENT
Start: 2022-03-14 | End: 2022-03-30 | Stop reason: ALTCHOICE

## 2022-03-14 RX ORDER — PANTOPRAZOLE SODIUM 40 MG/1
TABLET, DELAYED RELEASE ORAL
Qty: 30 TABLET | Refills: 2 | Status: SHIPPED | OUTPATIENT
Start: 2022-03-14 | End: 2022-06-02

## 2022-03-14 NOTE — TELEPHONE ENCOUNTER
----- Message from [facility] sent at 3/14/2022  7:58 AM EDT -----  Subject: Refill Request    QUESTIONS  Name of Medication? pantoprazole (PROTONIX) 40 mg tablet  Patient-reported dosage and instructions? TAKE 1 TABLET DAILY by mouth  How many days do you have left? 0  Preferred Pharmacy? [id number]  Pharmacy phone number (if available)? [phone]  ---------------------------------------------------------------------------  --------------,  Name of Medication? Other - lisinopril-hydroCHLOROthiazide (PRINZIDE,   ZESTORETIC) 20-25 mg per tablet [[id number]]   Patient-reported dosage and instructions? Take 1 Tab by mouth daily. -   Oral  How many days do you have left? 0  Preferred Pharmacy? [id number]  Pharmacy phone number (if available)? [phone]  ---------------------------------------------------------------------------  --------------  [facility] [place] INFO  What is the best way for the office to contact you? OK to leave message on   voicemail  Preferred Call Back Phone Number?  [phone]

## 2022-03-18 PROBLEM — D47.2 MONOCLONAL GAMMOPATHY: Status: ACTIVE | Noted: 2021-03-31

## 2022-03-18 PROBLEM — M43.16 SPONDYLOLISTHESIS OF LUMBAR REGION: Status: ACTIVE | Noted: 2018-09-26

## 2022-03-18 PROBLEM — D50.9 IRON DEFICIENCY ANEMIA: Status: ACTIVE | Noted: 2021-08-31

## 2022-03-19 PROBLEM — D50.9 MICROCYTIC ANEMIA: Status: ACTIVE | Noted: 2021-03-25

## 2022-03-19 PROBLEM — Z98.1 S/P LUMBAR SPINAL FUSION: Status: ACTIVE | Noted: 2018-09-26

## 2022-03-21 ENCOUNTER — ANCILLARY PROCEDURE (OUTPATIENT)
Dept: CARDIOLOGY CLINIC | Age: 62
End: 2022-03-21
Payer: COMMERCIAL

## 2022-03-21 ENCOUNTER — ANCILLARY PROCEDURE (OUTPATIENT)
Dept: CARDIOLOGY CLINIC | Age: 62
End: 2022-03-21

## 2022-03-21 VITALS — BODY MASS INDEX: 28.89 KG/M2 | WEIGHT: 157 LBS | HEIGHT: 62 IN

## 2022-03-21 VITALS
SYSTOLIC BLOOD PRESSURE: 130 MMHG | BODY MASS INDEX: 28.89 KG/M2 | HEIGHT: 62 IN | WEIGHT: 157 LBS | DIASTOLIC BLOOD PRESSURE: 72 MMHG

## 2022-03-21 DIAGNOSIS — R07.9 CHEST PAIN, UNSPECIFIED TYPE: ICD-10-CM

## 2022-03-21 LAB
ECHO AO ASC DIAM: 3.2 CM
ECHO AO ASCENDING AORTA INDEX: 1.86 CM/M2
ECHO AO ROOT DIAM: 2.8 CM
ECHO AO ROOT INDEX: 1.63 CM/M2
ECHO AV AREA PEAK VELOCITY: 2.1 CM2
ECHO AV AREA VTI: 2.3 CM2
ECHO AV AREA/BSA PEAK VELOCITY: 1.2 CM2/M2
ECHO AV AREA/BSA VTI: 1.3 CM2/M2
ECHO AV MEAN GRADIENT: 6 MMHG
ECHO AV MEAN VELOCITY: 1.2 M/S
ECHO AV PEAK GRADIENT: 13 MMHG
ECHO AV PEAK VELOCITY: 1.8 M/S
ECHO AV VELOCITY RATIO: 0.67
ECHO AV VTI: 40 CM
ECHO LA DIAMETER INDEX: 2.5 CM/M2
ECHO LA DIAMETER: 4.3 CM
ECHO LA TO AORTIC ROOT RATIO: 1.54
ECHO LA VOL 2C: 86 ML (ref 22–52)
ECHO LA VOL 4C: 93 ML (ref 22–52)
ECHO LA VOL BP: 91 ML (ref 22–52)
ECHO LA VOL/BSA BIPLANE: 53 ML/M2 (ref 16–34)
ECHO LA VOLUME AREA LENGTH: 97 ML
ECHO LA VOLUME INDEX A2C: 50 ML/M2 (ref 16–34)
ECHO LA VOLUME INDEX A4C: 54 ML/M2 (ref 16–34)
ECHO LA VOLUME INDEX AREA LENGTH: 56 ML/M2 (ref 16–34)
ECHO LV E' LATERAL VELOCITY: 7 CM/S
ECHO LV E' SEPTAL VELOCITY: 6 CM/S
ECHO LV EDV A2C: 113 ML
ECHO LV EDV A4C: 107 ML
ECHO LV EDV BP: 114 ML (ref 56–104)
ECHO LV EDV INDEX A4C: 62 ML/M2
ECHO LV EDV INDEX BP: 66 ML/M2
ECHO LV EDV NDEX A2C: 66 ML/M2
ECHO LV EJECTION FRACTION A2C: 60 %
ECHO LV EJECTION FRACTION A4C: 61 %
ECHO LV EJECTION FRACTION BIPLANE: 61 % (ref 55–100)
ECHO LV ESV A2C: 45 ML
ECHO LV ESV A4C: 42 ML
ECHO LV ESV BP: 44 ML (ref 19–49)
ECHO LV ESV INDEX A2C: 26 ML/M2
ECHO LV ESV INDEX A4C: 24 ML/M2
ECHO LV ESV INDEX BP: 26 ML/M2
ECHO LV FRACTIONAL SHORTENING: 30 % (ref 28–44)
ECHO LV INTERNAL DIMENSION DIASTOLE INDEX: 2.67 CM/M2
ECHO LV INTERNAL DIMENSION DIASTOLIC: 4.6 CM (ref 3.9–5.3)
ECHO LV INTERNAL DIMENSION SYSTOLIC INDEX: 1.86 CM/M2
ECHO LV INTERNAL DIMENSION SYSTOLIC: 3.2 CM
ECHO LV IVSD: 0.9 CM (ref 0.6–0.9)
ECHO LV MASS 2D: 148.1 G (ref 67–162)
ECHO LV MASS INDEX 2D: 86.1 G/M2 (ref 43–95)
ECHO LV POSTERIOR WALL DIASTOLIC: 1 CM (ref 0.6–0.9)
ECHO LV RELATIVE WALL THICKNESS RATIO: 0.43
ECHO LVOT AREA: 3.1 CM2
ECHO LVOT AV VTI INDEX: 0.7
ECHO LVOT DIAM: 2 CM
ECHO LVOT MEAN GRADIENT: 2 MMHG
ECHO LVOT PEAK GRADIENT: 5 MMHG
ECHO LVOT PEAK VELOCITY: 1.2 M/S
ECHO LVOT STROKE VOLUME INDEX: 50.9 ML/M2
ECHO LVOT SV: 87.6 ML
ECHO LVOT VTI: 27.9 CM
ECHO MV A VELOCITY: 1.37 M/S
ECHO MV AREA PHT: 2.1 CM2
ECHO MV E DECELERATION TIME (DT): 359.3 MS
ECHO MV E VELOCITY: 1.04 M/S
ECHO MV E/A RATIO: 0.76
ECHO MV E/E' LATERAL: 14.86
ECHO MV E/E' RATIO (AVERAGED): 16.1
ECHO MV E/E' SEPTAL: 17.33
ECHO MV PRESSURE HALF TIME (PHT): 104.2 MS
ECHO RV FREE WALL PEAK S': 12 CM/S
ECHO RV INTERNAL DIMENSION: 3.9 CM
ECHO RV TAPSE: 1.6 CM (ref 1.5–2)

## 2022-03-21 PROCEDURE — A9500 TC99M SESTAMIBI: HCPCS | Performed by: SPECIALIST

## 2022-03-21 PROCEDURE — 78452 HT MUSCLE IMAGE SPECT MULT: CPT | Performed by: SPECIALIST

## 2022-03-21 PROCEDURE — 93306 TTE W/DOPPLER COMPLETE: CPT | Performed by: SPECIALIST

## 2022-03-21 PROCEDURE — 93015 CV STRESS TEST SUPVJ I&R: CPT | Performed by: SPECIALIST

## 2022-03-21 RX ORDER — TETRAKIS(2-METHOXYISOBUTYLISOCYANIDE)COPPER(I) TETRAFLUOROBORATE 1 MG/ML
10 INJECTION, POWDER, LYOPHILIZED, FOR SOLUTION INTRAVENOUS ONCE
Status: COMPLETED | OUTPATIENT
Start: 2022-03-21 | End: 2022-03-21

## 2022-03-21 RX ORDER — TETRAKIS(2-METHOXYISOBUTYLISOCYANIDE)COPPER(I) TETRAFLUOROBORATE 1 MG/ML
30 INJECTION, POWDER, LYOPHILIZED, FOR SOLUTION INTRAVENOUS ONCE
Status: COMPLETED | OUTPATIENT
Start: 2022-03-21 | End: 2022-03-21

## 2022-03-21 RX ADMIN — TETRAKIS(2-METHOXYISOBUTYLISOCYANIDE)COPPER(I) TETRAFLUOROBORATE 8.8 MILLICURIE: 1 INJECTION, POWDER, LYOPHILIZED, FOR SOLUTION INTRAVENOUS at 09:50

## 2022-03-21 RX ADMIN — TETRAKIS(2-METHOXYISOBUTYLISOCYANIDE)COPPER(I) TETRAFLUOROBORATE 25.8 MILLICURIE: 1 INJECTION, POWDER, LYOPHILIZED, FOR SOLUTION INTRAVENOUS at 10:50

## 2022-03-22 LAB
NUC STRESS EJECTION FRACTION: 70 %
STRESS ANGINA INDEX: 0
STRESS BASELINE DIAS BP: 80 MMHG
STRESS BASELINE HR: 57 BPM
STRESS BASELINE SYS BP: 134 MMHG
STRESS ESTIMATED WORKLOAD: 7 METS
STRESS EXERCISE DUR MIN: 4 MIN
STRESS EXERCISE DUR SEC: 0 SEC
STRESS O2 SAT PEAK: 96 %
STRESS O2 SAT REST: 100 %
STRESS PEAK DIAS BP: 84 MMHG
STRESS PEAK SYS BP: 142 MMHG
STRESS PERCENT HR ACHIEVED: 92 %
STRESS POST PEAK HR: 146 BPM
STRESS RATE PRESSURE PRODUCT: NORMAL BPM*MMHG
STRESS SR DUKE TREADMILL SCORE: 4
STRESS ST DEPRESSION: 0 MM
STRESS TARGET HR: 158 BPM

## 2022-03-23 ENCOUNTER — TELEPHONE (OUTPATIENT)
Dept: CARDIOLOGY CLINIC | Age: 62
End: 2022-03-23

## 2022-03-23 NOTE — TELEPHONE ENCOUNTER
Patient is calling because she would like the nurse or doctor to go over echocardiogram and nuclear stress test results.     [phone]

## 2022-03-24 ENCOUNTER — HOSPITAL ENCOUNTER (OUTPATIENT)
Dept: MAMMOGRAPHY | Age: 62
Discharge: HOME | End: 2022-03-24
Attending: NURSE PRACTITIONER
Payer: COMMERCIAL

## 2022-03-24 DIAGNOSIS — Z12.31 VISIT FOR SCREENING MAMMOGRAM: ICD-10-CM

## 2022-03-24 PROCEDURE — 77067 SCR MAMMO BI INCL CAD: CPT

## 2022-03-25 NOTE — TELEPHONE ENCOUNTER
R/t call to pt, LVM  \"ADDENDUM   3/22/2022     Echo 3/21/22 - LVEF 61%, LAE, AV sclerosis.  Interatrial Septum: The septum is bowing into the RA. IVC diameter is greater than 21 mm and decreases less than 50% during inspiration; therefore the estimated right atrial pressure is elevated (~15 mmHg).    Exercise cardiolite 3/21/22 - walked 4 min (7 METS), no CP or ischemic EKG changes. LVEF 70%.     ADDENDUM   3/24/2022  I called and left a VM -- can discuss further at next visit \"

## 2022-03-28 NOTE — TELEPHONE ENCOUNTER
R/t call; LVM confirmed NOV  Per Dr. Brady Garcia: \"LV function is normal.  LV filling pressure is high and for that need to control BP, use diuretics, etc ---> will work on adjusting meds going forward. \"    Future Appointments   Date Time Provider Neena Feldman   3/30/2022  1:20 PM Mona RENEE, [facility]   4/21/2022 10:30 AM Jaxon Bush MD [facility] AM[facility]     Pt r/t call, discussed the above. She confirmed to come in Wed to discuss further.

## 2022-03-29 DIAGNOSIS — F41.9 ANXIETY: ICD-10-CM

## 2022-03-29 RX ORDER — ALPRAZOLAM 0.5 MG/1
TABLET ORAL
Qty: 60 TABLET | Refills: 0 | Status: SHIPPED | OUTPATIENT
Start: 2022-03-29 | End: 2022-05-31

## 2022-03-30 ENCOUNTER — OFFICE VISIT (OUTPATIENT)
Dept: CARDIOLOGY CLINIC | Age: 62
End: 2022-03-30
Payer: COMMERCIAL

## 2022-03-30 VITALS
WEIGHT: 163 LBS | DIASTOLIC BLOOD PRESSURE: 90 MMHG | HEART RATE: 74 BPM | SYSTOLIC BLOOD PRESSURE: 160 MMHG | HEIGHT: 62 IN | BODY MASS INDEX: 30 KG/M2 | OXYGEN SATURATION: 98 %

## 2022-03-30 DIAGNOSIS — I10 ESSENTIAL HYPERTENSION: Primary | ICD-10-CM

## 2022-03-30 DIAGNOSIS — F17.200 SMOKER: ICD-10-CM

## 2022-03-30 DIAGNOSIS — E78.5 DYSLIPIDEMIA: ICD-10-CM

## 2022-03-30 DIAGNOSIS — R07.89 ATYPICAL CHEST PAIN: ICD-10-CM

## 2022-03-30 PROCEDURE — 99214 OFFICE O/P EST MOD 30 MIN: CPT | Performed by: NURSE PRACTITIONER

## 2022-03-30 RX ORDER — LISINOPRIL 20 MG/1
20 TABLET ORAL DAILY
Qty: 30 TABLET | Refills: 0 | Status: SHIPPED | OUTPATIENT
Start: 2022-03-30 | End: 2022-04-26

## 2022-03-30 RX ORDER — SPIRONOLACTONE 25 MG/1
25 TABLET ORAL DAILY
Qty: 30 TABLET | Refills: 0 | Status: SHIPPED | OUTPATIENT
Start: 2022-03-30 | End: 2022-04-26

## 2022-03-30 RX ORDER — CHLORTHALIDONE 25 MG/1
25 TABLET ORAL DAILY
Qty: 30 TABLET | Refills: 0 | Status: SHIPPED | OUTPATIENT
Start: 2022-03-30 | End: 2022-04-26

## 2022-03-30 NOTE — PROGRESS NOTES
Chief Complaint   Patient presents with    Follow-up    Hypertension       Patient had an Nuclear Stress test & Echo on 3/21/22    Visit Vitals  BP (!) 160/90 (BP 1 Location: Right upper arm, BP Patient Position: Sitting)   Pulse 74   Ht 5' 2\" (1.575 m)   Wt 163 lb (73.9 kg)   SpO2 98%   BMI 29.81 kg/m²       Chest pain denied  SOB - with & without activity  Dizziness denied  Swelling/Edema - denied

## 2022-03-30 NOTE — PROGRESS NOTES
Meseret Clarke, [facility]  [id number] Anuj Parra  [place], [address] [facility]    Office [phone]  Fax [phone]          Patient: Steph Lucero  : 1960      Today's Date: 3/30/2022          HISTORY OF PRESENT ILLNESS:     History of Present Illness:  Here for fu of HTN as well as CP. Last seen by Dr. Lety Ortiz 22. Norvasc increased for BP. S/p stress testing as well as echo with stable / low risk findings. Needs HTN control. Here today - BP still elevated. No recent CP but notes LEIVA. Smokes but has been cutting back. Smokes about 15 cig/d. No sig swelling noted. No dizziness. PAST MEDICAL HISTORY:     Past Medical History:   Diagnosis Date    Anxiety     Chronic pain     Dyslipidemia     H/O spinal fusion     Hypertension     Microcytic anemia 3/25/2021    Non-nicotine vapor product user     Obesity     s/p weight loss surgery     Psychiatric disorder     anxiety    Smoker        Past Surgical History:   Procedure Laterality Date    HC LAP BAND ADJUST PROCEDURE      HX BREAST BIOPSY Left     years ago    HX BREAST BIOPSY Right 2018    Benign    HX CERVICAL FUSION      HX CHOLECYSTECTOMY      HX GASTRIC BYPASS      HX HYSTERECTOMY      HX ORTHOPAEDIC      foot surgery--bunions--bilateral    HX ORTHOPAEDIC      ACDF    SD REMOVAL GALLBLADDER           MEDICATIONS:     Current Outpatient Medications   Medication Sig Dispense Refill    ALPRAZolam (XANAX) 0.5 mg tablet TAKE 1 TABLET BY MOUTH TWICE DAILY as needed for anxiety 60 Tablet 0    lisinopril-hydroCHLOROthiazide (PRINZIDE, ZESTORETIC) 20-25 mg per tablet Take 1 Tablet by mouth daily. 30 Tablet 2    pantoprazole (PROTONIX) 40 mg tablet TAKE 1 TABLET DAILY by mouth 30 Tablet 2    amLODIPine (NORVASC) 10 mg tablet Take 1 Tablet by mouth daily. 90 Tablet 3    rosuvastatin (CRESTOR) 10 mg tablet Take 1 Tablet by mouth daily.  90 Tablet 3    fluticasone propionate (FLONASE) 50 mcg/actuation nasal spray 2 Sprays by Both Nostrils route daily. 1 Each 5    methocarbamoL (ROBAXIN) 500 mg tablet Take 1 Tablet by mouth nightly. For tension headaches 30 Tablet 2    gabapentin (NEURONTIN) 100 mg capsule Take 1 Cap by mouth two (2) times a day. Max Daily Amount: 200 mg. 60 Cap 2    meloxicam (MOBIC) 7.5 mg tablet Take 1 Tab by mouth daily. 30 Tab 5    aspirin/salicylamide/caffeine (BC HEADACHE POWDER PO) Take 1 Package by mouth.  azelastine (ASTELIN) 137 mcg (0.1 %) nasal spray 1 Spray as needed for Rhinitis. Use in each nostril as directed      cromolyn (OPTICROM) 4 % ophthalmic solution Administer 1 Drop to both eyes four (4) times daily. Use in affected eye(s) 10 mL 0    ferrous sulfate (SLOW FE) 142 mg (45 mg iron) ER tablet Take 1 Tab by mouth two (2) times a day. (Patient not taking: Reported on 2/28/2022) 60 Tab 5       No Known Allergies          SOCIAL HISTORY:     Social History     Tobacco Use    Smoking status: Current Every Day Smoker     Packs/day: 0.50     Years: 25.00     Pack years: 12.50     Types: Cigarettes    Smokeless tobacco: Never Used   Substance Use Topics    Alcohol use: Yes     Alcohol/week: 0.0 standard drinks     Comment: rare/1-2 per month    Drug use: No         FAMILY HISTORY:     Family History   Problem Relation Age of Onset    Diabetes Mother     Heart Disease Mother     Diabetes Father     Heart Disease Father     Other Sister         IBS/bowel intestine problems    Breast Cancer Sister 72    Breast Cancer Paternal Aunt         3 paunts, 5 pcousins           REVIEW OF SYMPTOMS:     Review of Symptoms:  Constitutional: Negative for fever, chills  HEENT: Negative for nosebleeds, tinnitus, and vision changes. Respiratory: Negative for cough, wheezing  Cardiovascular: Negative for orthopnea, claudication, syncope, and PND. Gastrointestinal: Negative for abdominal pain, diarrhea, melena.    Genitourinary: Negative for dysuria  Musculoskeletal: Negative for myalgias. Skin: Negative for rash  Heme: No problems bleeding. Neurological: Negative for speech change and focal weakness. PHYSICAL EXAM:     Physical Exam:  Visit Vitals  BP (!) 160/90 (BP 1 Location: Right upper arm, BP Patient Position: Sitting)   Pulse 74   Ht 5' 2\" (1.575 m)   Wt 163 lb (73.9 kg)   SpO2 98%   BMI 29.81 kg/m²     Patient appears generally well, mood and affect are appropriate and pleasant. HEENT:  Hearing intact, non-icteric, normocephalic, atraumatic. Neck Exam: Supple, No JVD or carotid bruits. Lung Exam: Clear to auscultation, even breath sounds. Cardiac Exam: Regular rate and rhythm with 2/6 systolic murmur   Abdomen: Soft, non-tender, normal bowel sounds  Extremities: Moves all ext well. No lower extremity edema. MSKTL: Overall good ROM ext  Skin: No significant rashes  Vascular: palpable dorsalis pedis pulses bilaterally. Psych: Appropriate affect  Neuro - Grossly intact      LABS / OTHER STUDIES reviewed:     Lab Results   Component Value Date/Time    Sodium 139 02/21/2022 10:54 AM    Potassium 3.9 02/21/2022 10:54 AM    Chloride 112 (H) 02/21/2022 10:54 AM    CO2 24 02/21/2022 10:54 AM    Anion gap 3 (L) 02/21/2022 10:54 AM    Glucose 82 02/21/2022 10:54 AM    BUN 14 02/21/2022 10:54 AM    Creatinine 0.71 02/21/2022 10:54 AM    BUN/Creatinine ratio 20 02/21/2022 10:54 AM    GFR est AA >60 02/21/2022 10:54 AM    GFR est non-AA >60 02/21/2022 10:54 AM    Calcium 9.4 02/21/2022 10:54 AM    Bilirubin, total 0.3 02/21/2022 10:54 AM    Alk.  phosphatase 120 (H) 02/21/2022 10:54 AM    Protein, total 7.2 02/21/2022 10:54 AM    Albumin 3.8 02/21/2022 10:54 AM    Globulin 3.4 02/21/2022 10:54 AM    A-G Ratio 1.1 02/21/2022 10:54 AM    ALT (SGPT) 13 02/21/2022 10:54 AM    AST (SGOT) 12 (L) 02/21/2022 10:54 AM     Lab Results   Component Value Date/Time    WBC 6.2 02/21/2022 10:54 AM    HGB 14.8 02/21/2022 10:54 AM    HCT 45.6 02/21/2022 10:54 AM    PLATELET 890 26/37/1140 10:54 AM    .9 (H) 02/21/2022 10:54 AM       Lab Results   Component Value Date/Time    Cholesterol, total 253 (H) 02/07/2022 12:00 AM    HDL Cholesterol 100 02/07/2022 12:00 AM    LDL, calculated 145 (H) 02/07/2022 12:00 AM    LDL, calculated 103.4 (H) 03/02/2021 09:19 AM    VLDL, calculated 8 02/07/2022 12:00 AM    VLDL, calculated 7.6 03/02/2021 09:19 AM    Triglyceride 50 02/07/2022 12:00 AM    CHOL/HDL Ratio 2.1 03/02/2021 09:19 AM       Lab Results   Component Value Date/Time    TSH 0.992 02/07/2022 12:00 AM           CARDIAC DIAGNOSTICS:     Cardiac Evaluation Includes:  I reviewed the results below. EKG 2/7/22 - NSR, possible LAE  EKG 2/22/22 - NSR, possible LAE    Echo 5/12 - LVEF 55-60%  CXR 2/21/22 - clear     Echo 3/21/22 - LVEF 61%, LAE, AV sclerosis. Interatrial Septum: The septum is bowing into the RA. IVC diameter is greater than 21 mm and decreases less than 50% during inspiration; therefore the estimated right atrial pressure is elevated (~15 mmHg). Exercise cardiolite 3/21/22 - walked 4 min (7 METS), no CP or ischemic EKG changes. LVEF 70%. ASSESSMENT AND PLAN:     Assessment and Plan:  1) Atypical CP   - Echo 3/21/22 - LVEF 61%, LAE, AV sclerosis. Interatrial Septum: The septum is bowing into the RA. IVC diameter is greater than 21 mm and decreases less than 50% during inspiration; therefore the estimated right atrial pressure is elevated (~15 mmHg). Exercise cardiolite 3/21/22 - walked 4 min (7 METS), no CP or ischemic EKG changes. LVEF 70%. - LV function is normal.  LV filling pressure is high and needs BP control. See med changes below.     2) CV risk   - she is at high risk long term with her smoking and FH  - Needs risk factor control   - cont statin and ASA     3) HTN  - BP remains high  - change prinzide to chlorthalidone 25mg/d and lisinopril 20mg/d; start holley 25mg/d   - check updated BMP in 2 wks     4) Dyslipidemia - she recently started a statin Feb 2022    4) Smoking cessation discussed again today     F/u again in 2 wks or PRN  Retiring in March [address] [facility] Birth Certificates).         Paco Jara, ANP

## 2022-04-13 ENCOUNTER — OFFICE VISIT (OUTPATIENT)
Dept: CARDIOLOGY CLINIC | Age: 62
End: 2022-04-13
Payer: COMMERCIAL

## 2022-04-13 VITALS
HEIGHT: 62 IN | BODY MASS INDEX: 29 KG/M2 | HEART RATE: 71 BPM | RESPIRATION RATE: 18 BRPM | SYSTOLIC BLOOD PRESSURE: 138 MMHG | DIASTOLIC BLOOD PRESSURE: 76 MMHG | OXYGEN SATURATION: 95 % | WEIGHT: 157.6 LBS

## 2022-04-13 DIAGNOSIS — R07.89 ATYPICAL CHEST PAIN: ICD-10-CM

## 2022-04-13 DIAGNOSIS — F17.200 SMOKER: ICD-10-CM

## 2022-04-13 DIAGNOSIS — I10 ESSENTIAL HYPERTENSION: Primary | ICD-10-CM

## 2022-04-13 DIAGNOSIS — E78.5 DYSLIPIDEMIA: ICD-10-CM

## 2022-04-13 PROCEDURE — 99214 OFFICE O/P EST MOD 30 MIN: CPT | Performed by: NURSE PRACTITIONER

## 2022-04-13 NOTE — PROGRESS NOTES
Gold Kurtz, BERTA  [id number] Anuj Parra  [place], [address] [facility]    Office [phone]  Fax [phone]          Patient: Amelie Montiel  : 1960      Today's Date: 2022          HISTORY OF PRESENT ILLNESS:     History of Present Illness:  Here for fu of HTN. Mult med changes made last visit. Brief dizziness at times - self resolves. S/p stress testing as well as echo with stable / low risk findings. Needs      Does not have home BP log to review. No recent CP. Smokes but has been cutting back. +fatigue. Seeing provider about possible iron infusion - has needed in past.     PAST MEDICAL HISTORY:     Past Medical History:   Diagnosis Date    Anxiety     Chronic pain     Dyslipidemia     H/O spinal fusion     Hypertension     Microcytic anemia 3/25/2021    Non-nicotine vapor product user     Obesity     s/p weight loss surgery     Psychiatric disorder     anxiety    Smoker        Past Surgical History:   Procedure Laterality Date    HC LAP BAND ADJUST PROCEDURE      HX BREAST BIOPSY Left     years ago    HX BREAST BIOPSY Right 2018    Benign    HX CERVICAL FUSION      HX CHOLECYSTECTOMY      HX GASTRIC BYPASS      HX HYSTERECTOMY      HX ORTHOPAEDIC      foot surgery--bunions--bilateral    HX ORTHOPAEDIC      ACDF    MO REMOVAL GALLBLADDER           MEDICATIONS:     Current Outpatient Medications   Medication Sig Dispense Refill    chlorthalidone (HYGROTON) 25 mg tablet Take 1 Tablet by mouth daily. 30 Tablet 0    lisinopriL (PRINIVIL, ZESTRIL) 20 mg tablet Take 1 Tablet by mouth daily. 30 Tablet 0    spironolactone (ALDACTONE) 25 mg tablet Take 1 Tablet by mouth daily.  30 Tablet 0    ALPRAZolam (XANAX) 0.5 mg tablet TAKE 1 TABLET BY MOUTH TWICE DAILY as needed for anxiety 60 Tablet 0    pantoprazole (PROTONIX) 40 mg tablet TAKE 1 TABLET DAILY by mouth 30 Tablet 2    amLODIPine (NORVASC) 10 mg tablet Take 1 Tablet by mouth daily. 90 Tablet 3    rosuvastatin (CRESTOR) 10 mg tablet Take 1 Tablet by mouth daily. 90 Tablet 3    fluticasone propionate (FLONASE) 50 mcg/actuation nasal spray 2 Sprays by Both Nostrils route daily. 1 Each 5    methocarbamoL (ROBAXIN) 500 mg tablet Take 1 Tablet by mouth nightly. For tension headaches 30 Tablet 2    gabapentin (NEURONTIN) 100 mg capsule Take 1 Cap by mouth two (2) times a day. Max Daily Amount: 200 mg. (Patient taking differently: Take 100 mg by mouth two (2) times daily as needed.) 60 Cap 2    meloxicam (MOBIC) 7.5 mg tablet Take 1 Tab by mouth daily. 30 Tab 5    aspirin/salicylamide/caffeine (BC HEADACHE POWDER PO) Take 1 Package by mouth.  azelastine (ASTELIN) 137 mcg (0.1 %) nasal spray 1 Spray as needed for Rhinitis. Use in each nostril as directed      cromolyn (OPTICROM) 4 % ophthalmic solution Administer 1 Drop to both eyes four (4) times daily. Use in affected eye(s) 10 mL 0    ferrous sulfate (SLOW FE) 142 mg (45 mg iron) ER tablet Take 1 Tab by mouth two (2) times a day. (Patient not taking: Reported on 2/28/2022) 60 Tab 5       No Known Allergies          SOCIAL HISTORY:     Social History     Tobacco Use    Smoking status: Current Every Day Smoker     Packs/day: 0.50     Years: 25.00     Pack years: 12.50     Types: Cigarettes    Smokeless tobacco: Never Used   Substance Use Topics    Alcohol use: Yes     Alcohol/week: 0.0 standard drinks     Comment: rare/1-2 per month    Drug use: No         FAMILY HISTORY:     Family History   Problem Relation Age of Onset    Diabetes Mother     Heart Disease Mother     Diabetes Father     Heart Disease Father     Other Sister         IBS/bowel intestine problems    Breast Cancer Sister 72    Breast Cancer Paternal Aunt         3 paunts, 5 pcousins           REVIEW OF SYMPTOMS:     Review of Symptoms:  Constitutional: Negative for fever, chills  HEENT: Negative for nosebleeds, tinnitus, and vision changes.    Respiratory: Negative for cough, wheezing  Cardiovascular: Negative for orthopnea, claudication, syncope, and PND. Gastrointestinal: Negative for abdominal pain, diarrhea, melena. Genitourinary: Negative for dysuria  Musculoskeletal: Negative for myalgias. Skin: Negative for rash  Heme: No problems bleeding. Neurological: Negative for speech change and focal weakness. PHYSICAL EXAM:     Physical Exam:  Visit Vitals  /76 (BP 1 Location: Left upper arm, BP Patient Position: Sitting, BP Cuff Size: Adult)   Pulse 71   Resp 18   Ht 5' 2\" (1.575 m)   Wt 157 lb 9.6 oz (71.5 kg)   SpO2 95%   BMI 28.83 kg/m²     BP Readings from Last 3 Encounters:   04/13/22 138/76   03/30/22 (!) 160/90   03/21/22 130/72     Patient appears generally well, mood and affect are appropriate and pleasant. HEENT:  Hearing intact, non-icteric, normocephalic, atraumatic. Neck Exam: Supple, No JVD or carotid bruits. Lung Exam: Clear to auscultation, even breath sounds. Cardiac Exam: Regular rate and rhythm with 2/6 systolic murmur   Abdomen: Soft, non-tender, normal bowel sounds  Extremities: Moves all ext well. No lower extremity edema. MSKTL: Overall good ROM ext  Skin: No significant rashes  Vascular: palpable dorsalis pedis pulses bilaterally. Psych: Appropriate affect  Neuro - Grossly intact      LABS / OTHER STUDIES reviewed:     Lab Results   Component Value Date/Time    Sodium 139 02/21/2022 10:54 AM    Potassium 3.9 02/21/2022 10:54 AM    Chloride 112 (H) 02/21/2022 10:54 AM    CO2 24 02/21/2022 10:54 AM    Anion gap 3 (L) 02/21/2022 10:54 AM    Glucose 82 02/21/2022 10:54 AM    BUN 14 02/21/2022 10:54 AM    Creatinine 0.71 02/21/2022 10:54 AM    BUN/Creatinine ratio 20 02/21/2022 10:54 AM    GFR est AA >60 02/21/2022 10:54 AM    GFR est non-AA >60 02/21/2022 10:54 AM    Calcium 9.4 02/21/2022 10:54 AM    Bilirubin, total 0.3 02/21/2022 10:54 AM    Alk.  phosphatase 120 (H) 02/21/2022 10:54 AM    Protein, total 7.2 02/21/2022 10:54 AM    Albumin 3.8 02/21/2022 10:54 AM    Globulin 3.4 02/21/2022 10:54 AM    A-G Ratio 1.1 02/21/2022 10:54 AM    ALT (SGPT) 13 02/21/2022 10:54 AM    AST (SGOT) 12 (L) 02/21/2022 10:54 AM     Lab Results   Component Value Date/Time    WBC 6.2 02/21/2022 10:54 AM    HGB 14.8 02/21/2022 10:54 AM    HCT 45.6 02/21/2022 10:54 AM    PLATELET 859 43/23/6552 10:54 AM    .9 (H) 02/21/2022 10:54 AM       Lab Results   Component Value Date/Time    Cholesterol, total 253 (H) 02/07/2022 12:00 AM    HDL Cholesterol 100 02/07/2022 12:00 AM    LDL, calculated 145 (H) 02/07/2022 12:00 AM    LDL, calculated 103.4 (H) 03/02/2021 09:19 AM    VLDL, calculated 8 02/07/2022 12:00 AM    VLDL, calculated 7.6 03/02/2021 09:19 AM    Triglyceride 50 02/07/2022 12:00 AM    CHOL/HDL Ratio 2.1 03/02/2021 09:19 AM       Lab Results   Component Value Date/Time    TSH 0.992 02/07/2022 12:00 AM           CARDIAC DIAGNOSTICS:     Cardiac Evaluation Includes:  I reviewed the results below. EKG 2/7/22 - NSR, possible LAE  EKG 2/22/22 - NSR, possible LAE    Echo 5/12 - LVEF 55-60%  CXR 2/21/22 - clear     Echo 3/21/22 - LVEF 61%, LAE, AV sclerosis. Interatrial Septum: The septum is bowing into the RA. IVC diameter is greater than 21 mm and decreases less than 50% during inspiration; therefore the estimated right atrial pressure is elevated (~15 mmHg). Exercise cardiolite 3/21/22 - walked 4 min (7 METS), no CP or ischemic EKG changes. LVEF 70%. ASSESSMENT AND PLAN:     Assessment and Plan:  1) hx of Atypical CP   - Echo 3/21/22 - LVEF 61%, LAE, AV sclerosis. Interatrial Septum: The septum is bowing into the RA. IVC diameter is greater than 21 mm and decreases less than 50% during inspiration; therefore the estimated right atrial pressure is elevated (~15 mmHg). Exercise cardiolite 3/21/22 - walked 4 min (7 METS), no CP or ischemic EKG changes. LVEF 70%.      - LV function is normal.  LV filling pressure is high and needs BP control. See med changes below. - no c/o today     2) CV risk   - she is at high risk long term with her smoking and FH  - Needs risk factor control   - cont statin and ASA     3) HTN  - BP improved in office / mildly elevated   - asked pt to keep home log x2 weeks with phone fu   - BMP OK 4/12 - pt advised to stay well hydrated. 4) Dyslipidemia   - she recently started a statin Feb 2022    4) Smoking cessation discussed again today     F/u again in 2 wks or PRN (VV)  Retiring in March [address] [facility] Birth Certificates).         Yasmin To, ANP

## 2022-04-13 NOTE — LETTER
4/18/2022    Patient: Boo Leonardo   YOB: 1960   Date of Visit: 4/13/2022     Estefani Armenta NP  [address] [id number]  [address] [facility] [address]    Dear Estefani Armenta NP,      Thank you for referring Ms. Hernando Banegas to [facility] for evaluation. My notes for this consultation are attached. If you have questions, please do not hesitate to call me. I look forward to following your patient along with you.       Sincerely,    Jac Thurston NP

## 2022-04-13 NOTE — PROGRESS NOTES
EP Room # 5  · Quincy Chopra patient  · Chest Pain  · HTN  · Increased Fatigue since last visit  · Echo and stress nuclear on 3/22/22    Funmi Granados is a 57 y.o. female    Chief Complaint   Patient presents with    Hypertension    Chest Pain (Angina)    Follow-up     Appointment on the 21st with Dr. Deb Grover to see if iron infusion are needed. Chest pain Yes.  5/10. Improved. SOB Yes. Dizziness NO    Swelling NO    Refills No    Visit Vitals  /76 (BP 1 Location: Left upper arm, BP Patient Position: Sitting, BP Cuff Size: Adult)   Pulse 71   Resp 18   Ht 5' 2\" (1.575 m)   Wt 157 lb 9.6 oz (71.5 kg)   SpO2 95%   BMI 28.83 kg/m²       1. Have you been to the ER, urgent care clinic since your last visit? Hospitalized since your last visit? No    2. Have you seen or consulted any other health care providers outside of the [address] [place] [address] since your last visit? Include any pap smears or colon screening.   No

## 2022-04-21 ENCOUNTER — VIRTUAL VISIT (OUTPATIENT)
Dept: ONCOLOGY | Age: 62
End: 2022-04-21
Payer: COMMERCIAL

## 2022-04-21 DIAGNOSIS — D50.9 IRON DEFICIENCY ANEMIA, UNSPECIFIED IRON DEFICIENCY ANEMIA TYPE: Primary | ICD-10-CM

## 2022-04-21 DIAGNOSIS — D47.2 MONOCLONAL GAMMOPATHY: ICD-10-CM

## 2022-04-21 PROCEDURE — 99214 OFFICE O/P EST MOD 30 MIN: CPT | Performed by: STUDENT IN AN ORGANIZED HEALTH CARE EDUCATION/TRAINING PROGRAM

## 2022-04-21 NOTE — PROGRESS NOTES
[facility] HEMATOLOGY/ONCOLOGY CLINIC NOTE    ID: Sydney Rojas is a 57 y.o. female    PCP: Darius Cannon NP    Referral Requested by: Darius Cannon NP    Chief Complaint/Reason for Referral: Microcytic Anemia , MGUS  Chief Complaint   Patient presents with    Follow-up    Anemia         HISTORY OF PRESENT ILLNESS:    Diagnosis:  1) Microcytic Anemia     2) MGUS       Date of Diagnosis: 4/2016    Current Treatment: Oral Iron BID      Hematologic/Oncologic History:     Briefly this is a very pleasant 25-year-old -American female with a past medical history most notable for peptic ulcer disease, GERD, anxiety/depression, hypothyroidism, asthma, and a longstanding history of iron deficiency anemia for which the patient had been taking oral iron intermittently. On chart review, the patient is seen to have had evidence of iron deficiency with microcytic anemia that was worsening since September 2018. The patient reports that she has a distant history of peptic ulcer disease but is currently having symptoms that are consistent with past episodes of gastric ulcers. She describes aching pain at night that wakes her from sleep in the stomach that is relieved by acid reflux medications. The patient does take Protonix daily, and reports that she is requiring this medicine in order to maintain management of her GERD and stomach discomfort. The patient has been taking oral iron supplementation as prescribed, however she has been having significant difficulty with toleration as the medication causes constipation and nausea. The patient has tried MiraLAX to relieve some of her constipation symptoms of this continues to be problematic for her. Family history reviewed, significant for breast cancer in her sister age 61. No other known cancer history in the family. The patient denies any family history of thalassemia or other blood disorder.     Social history reviewed, significant for only tobacco use, half a pack per day for the last 30 years. No reported illicit drug use.      5/25/21: The patient denies any new clinical changes. She reports marked improvement of fatigue, interval resolution of abdominal pain. She continues to have some constipation which limits her ability to tolerate oral iron. Since last encounter the patient had IV iron infusion on 04/05/2021 as well as 04/12/2021. Repeat labs from after iron infusion were not available prior to patient encounter. 8/31/21: Patient seen and examined virtually for this encounter, using audiovisual conferencing allergy. Patient reports that she has worsening fatigue, generalized symptoms of malaise since last encounter. She reports that she feels like she is iron deficient again. Most recent iron studies were completed on August 25, 2021 showing mild iron deficiency with iron saturation of 17%, ferritin 27. The patient did have colonoscopy and EGD that showed erosive gastritis and esophagitis. colonoscopy was completed showing no Bleeding tumors or masses. Otherwise, patient has no other constitutional symptoms. 10/25/21:  Patient doing well. Still has mild fatigue. Has noticed increased BP. Now s/p IV iron with venofer 200 mg x 4 weekly doses. Tolerated well. Patient has improvement in ice cravings. No other complaints of bleeding, bruising, or other hematological symptoms. Repeat CBC from 10/12/21 shows marked improvement in Hg to 14.3 g/dl. ECOG Performance Status: (0) Fully active, able to carry on all predisease performance without restriction    Interval History:   Flo Karthikeyan, who was evaluated through a synchronous (real-time) audio-video encounter, and/or her healthcare decision maker, is aware that it is a billable service, with coverage as determined by her insurance carrier. She provided verbal consent to proceed: Yes, and patient identification was verified.  This visit was conducted pursuant to the emergency declaration under the [address] [facility] authority and the [employer] and [employer] General Act. A caregiver was present when appropriate. Ability to conduct physical exam was limited. The patient was located in a state where the provider was credentialed to provide care. --Dorothy Hicks MD on 4/21/2022 at 1:56 PM                4/21/22:  Patient doing well. Still has mild fatigue. Repeat iron labs show resolution of iron deficiency. Repeat SPEP shows stability of MGUS. No other new changes. ECOG Performance Status: (0) Fully active, able to carry on all predisease performance without restriction      ALLERGIES:  No Known Allergies    MEDICATIONS:  Current Outpatient Medications on File Prior to Visit   Medication Sig Dispense Refill    chlorthalidone (HYGROTON) 25 mg tablet Take 1 Tablet by mouth daily. 30 Tablet 0    lisinopriL (PRINIVIL, ZESTRIL) 20 mg tablet Take 1 Tablet by mouth daily. 30 Tablet 0    spironolactone (ALDACTONE) 25 mg tablet Take 1 Tablet by mouth daily. 30 Tablet 0    ALPRAZolam (XANAX) 0.5 mg tablet TAKE 1 TABLET BY MOUTH TWICE DAILY as needed for anxiety 60 Tablet 0    pantoprazole (PROTONIX) 40 mg tablet TAKE 1 TABLET DAILY by mouth 30 Tablet 2    amLODIPine (NORVASC) 10 mg tablet Take 1 Tablet by mouth daily. 90 Tablet 3    rosuvastatin (CRESTOR) 10 mg tablet Take 1 Tablet by mouth daily. 90 Tablet 3    fluticasone propionate (FLONASE) 50 mcg/actuation nasal spray 2 Sprays by Both Nostrils route daily. 1 Each 5    gabapentin (NEURONTIN) 100 mg capsule Take 1 Cap by mouth two (2) times a day. Max Daily Amount: 200 mg. (Patient taking differently: Take 100 mg by mouth two (2) times daily as needed.) 60 Cap 2    meloxicam (MOBIC) 7.5 mg tablet Take 1 Tab by mouth daily. 30 Tab 5    aspirin/salicylamide/caffeine (BC HEADACHE POWDER PO) Take 1 Package by mouth.       azelastine (ASTELIN) 137 mcg (0.1 %) nasal spray 1 Spray as needed for Rhinitis. Use in each nostril as directed      cromolyn (OPTICROM) 4 % ophthalmic solution Administer 1 Drop to both eyes four (4) times daily. Use in affected eye(s) 10 mL 0    methocarbamoL (ROBAXIN) 500 mg tablet Take 1 Tablet by mouth nightly. For tension headaches (Patient not taking: Reported on 4/21/2022) 30 Tablet 2     No current facility-administered medications on file prior to visit. PMH:  Past Medical History:   Diagnosis Date    Anxiety     Chronic pain     Dyslipidemia     H/O spinal fusion     Hypertension     Microcytic anemia 3/25/2021    Non-nicotine vapor product user     Obesity     s/p weight loss surgery     Psychiatric disorder     anxiety    Smoker      Past Surgical History:   Procedure Laterality Date    HC LAP BAND ADJUST PROCEDURE      HX BREAST BIOPSY Left     years ago    HX BREAST BIOPSY Right 06/2018    Benign    HX CERVICAL FUSION      HX CHOLECYSTECTOMY      HX GASTRIC BYPASS      HX HYSTERECTOMY      HX ORTHOPAEDIC      foot surgery--bunions--bilateral    HX ORTHOPAEDIC      ACDF    LA REMOVAL GALLBLADDER       Patient Active Problem List   Diagnosis Code    HTN (hypertension) I10    Anxiety F41.9    GERD without esophagitis K21.9    Spondylolisthesis of lumbar region M43.16    S/P lumbar spinal fusion Z98.1    Microcytic anemia D50.9    Monoclonal gammopathy D47.2    Iron deficiency anemia D50.9    Smoker F17.200       SOCIAL AND FAMILY HISTORY:  Social History     Socioeconomic History    Marital status:    Tobacco Use    Smoking status: Current Every Day Smoker     Packs/day: 0.50     Years: 25.00     Pack years: 12.50     Types: Cigarettes    Smokeless tobacco: Never Used   Substance and Sexual Activity    Alcohol use:  Yes     Alcohol/week: 0.0 standard drinks     Comment: rare/1-2 per month    Drug use: No    Sexual activity: Yes     Partners: Male     Birth control/protection: None     family history includes Breast Cancer in her paternal aunt; Breast Cancer (age of onset: 72) in her sister; Diabetes in her father and mother; Heart Disease in her father and mother; Other in her sister. REVIEW OF SYSTEMS:   Review of Systems   Constitutional: Negative for fever and weight loss. HENT: Negative. Negative for nosebleeds. Eyes: Negative. Respiratory: Negative. Negative for cough, hemoptysis and shortness of breath. Cardiovascular: Negative. Negative for chest pain and leg swelling. Gastrointestinal: Negative for abdominal pain, blood in stool, constipation, diarrhea, heartburn, melena, nausea and vomiting. Genitourinary: Negative. Negative for hematuria. Musculoskeletal: Negative. Negative for falls and myalgias. Skin: Negative. Negative for rash. Neurological: Negative. Endo/Heme/Allergies: Negative. PHYSICAL EXAMINATION:  There were no vitals filed for this visit. General: alert, cooperative, no distress   Mental  status: normal mood, behavior, speech, dress, motor activity, and thought processes, able to follow commands   HENT: NCAT   Neck: no visualized mass   Resp: no respiratory distress   Neuro: no gross deficits   Skin: no discoloration or lesions of concern on visible areas   Psychiatric: normal affect, consistent with stated mood, no evidence of hallucinations     DATA REVIEW:     Lab Results   Component Value Date    HGB 14.2 04/12/2022    HCT 44.4 04/12/2022    WBC 5.4 04/12/2022     04/12/2022       No components found for: NEUTOPHILPCT, LYMPHOPCT, MONOPCT, EOSPCT  Lab Results   Component Value Date     04/12/2022    K 4.6 04/12/2022     04/12/2022    BUN 18 04/12/2022     Lab Results   Component Value Date    AST 12 (L) 04/12/2022    ALT 13 04/12/2022    TBILI 0.2 04/12/2022     Chart from patient's PCP reviewed lab values to detailed in HPI.   Initial iron saturation of only 3%, elevated TIBC, no ferritin was ordered. ASSESSMENT & PLAN:    59-year-old female with a past medical history notable for gastroesophageal reflux disorder, history of gastric ulcers, anxiety, history of spinal stenosis, presents for evaluation of treatment of chronic microcytic anemia that has been refractory to oral iron supplementation. She received IV iron with Injectafer x2. She presents today for follow-up    - # Microcytic Anemia of secondary to iron deficiency anemia  - now s/p IV iron last year with good improvement. No recent IV iron requirements    - repeat CBC with iron studies in 6 months, can be done by patient's PCP.      - Patient asked to return to clinic if she requires additional IV iron. - # Monoclonal Gammopathy of unknown significance   - 0.5 g/dl m-spike. IgA lambda light chain.   - 24 hour urine shows no paraprotein. - No other clinical signs or findings of myeloma   - repeat SPEP is stable without worsening. Can be monitored yearly with gammopathy profile by the patient's PCP. Return visit:  PRN. Should have the above labs yearly with her PCP.        Jaimee Trejo MD  Medical Oncologist   [facility]

## 2022-04-21 NOTE — PROGRESS NOTES
Norberto Turk is a 57 y.o. female seeing provider today for TALON and to review labs. Pt report fatigue. Chief Complaint   Patient presents with    Follow-up    Anemia     1. Have you been to the ER, urgent care clinic since your last visit? Hospitalized since your last visit? Yes; Elevated B/P     2. Have you seen or consulted any other health care providers outside of the [address] [place] [address] since your last visit? Include any pap smears or colon screening.  Yes

## 2022-04-27 ENCOUNTER — VIRTUAL VISIT (OUTPATIENT)
Dept: CARDIOLOGY CLINIC | Age: 62
End: 2022-04-27
Payer: COMMERCIAL

## 2022-04-27 DIAGNOSIS — F17.200 SMOKER: ICD-10-CM

## 2022-04-27 DIAGNOSIS — I10 ESSENTIAL HYPERTENSION: Primary | ICD-10-CM

## 2022-04-27 PROCEDURE — 99441 PR PHYS/QHP TELEPHONE EVALUATION 5-10 MIN: CPT | Performed by: NURSE PRACTITIONER

## 2022-04-27 NOTE — PROGRESS NOTES
Telephone only VV -    Called pt to fu on BP log. Log below BP, HR:     121/55, 46   153/81, 55  141/81 68  141/74 77  110/61 71  110/56 69  107/56  118/46 63  114/56 72    Taking all meds as rx. No cardiac c/o. Patient denies any exertional chest pain, breathing issues, palpitations, syncope, edema, or paroxysmal nocturnal dyspnea. Cont to have generalized fatigue - admits to poor hydration. Still smoking about 15 cigs per day - trying to quit       ICD-10-CM ICD-9-CM    1. Essential hypertension  I10 401.9    2. Smoker  F17.200 305.1      BP looks good - mostly 110s/50s-60s. Cont current meds. Advised to increase hydration - goal ~2L per day. Again discussed smoking cessation. F/u as prev scheduled in ~6mo or PRN.    Telephone visit 5-10min

## 2022-06-02 DIAGNOSIS — K21.9 GERD WITHOUT ESOPHAGITIS: ICD-10-CM

## 2022-06-02 RX ORDER — PANTOPRAZOLE SODIUM 40 MG/1
TABLET, DELAYED RELEASE ORAL
Qty: 30 TABLET | Refills: 2 | Status: SHIPPED | OUTPATIENT
Start: 2022-06-02 | End: 2022-08-31

## 2022-06-08 ENCOUNTER — TELEPHONE (OUTPATIENT)
Dept: ONCOLOGY | Age: 62
End: 2022-06-08

## 2022-06-08 NOTE — TELEPHONE ENCOUNTER
Returned call to pt to let her know the infusion  is not sure why someone called her. No infusion order is entered and or ordered from us. Pt verbalized understanding.

## 2022-06-08 NOTE — TELEPHONE ENCOUNTER
[facility] - patient was not aware of what they needed to get this done. Please call to discuss.

## 2022-07-25 ENCOUNTER — TELEPHONE (OUTPATIENT)
Dept: FAMILY MEDICINE CLINIC | Age: 62
End: 2022-07-25

## 2022-07-25 ENCOUNTER — OFFICE VISIT (OUTPATIENT)
Dept: FAMILY MEDICINE CLINIC | Age: 62
End: 2022-07-25
Payer: COMMERCIAL

## 2022-07-25 VITALS
DIASTOLIC BLOOD PRESSURE: 79 MMHG | RESPIRATION RATE: 18 BRPM | SYSTOLIC BLOOD PRESSURE: 168 MMHG | HEIGHT: 62 IN | OXYGEN SATURATION: 98 % | WEIGHT: 154 LBS | HEART RATE: 61 BPM | TEMPERATURE: 98.1 F | BODY MASS INDEX: 28.34 KG/M2

## 2022-07-25 DIAGNOSIS — M79.2 NERVE PAIN: ICD-10-CM

## 2022-07-25 DIAGNOSIS — F41.9 ANXIETY: ICD-10-CM

## 2022-07-25 DIAGNOSIS — M54.31 RIGHT SIDED SCIATICA: Primary | ICD-10-CM

## 2022-07-25 PROCEDURE — 99214 OFFICE O/P EST MOD 30 MIN: CPT | Performed by: NURSE PRACTITIONER

## 2022-07-25 RX ORDER — GABAPENTIN 300 MG/1
300 CAPSULE ORAL 3 TIMES DAILY
Qty: 90 CAPSULE | Refills: 1 | Status: SHIPPED | OUTPATIENT
Start: 2022-07-25

## 2022-07-25 RX ORDER — ALPRAZOLAM 0.5 MG/1
TABLET ORAL
Qty: 60 TABLET | Refills: 0 | Status: SHIPPED | OUTPATIENT
Start: 2022-07-25 | End: 2022-09-20

## 2022-07-25 RX ORDER — FLUTICASONE PROPIONATE 50 MCG
2 SPRAY, SUSPENSION (ML) NASAL DAILY
Qty: 1 EACH | Refills: 5 | Status: SHIPPED | OUTPATIENT
Start: 2022-07-25

## 2022-07-25 NOTE — TELEPHONE ENCOUNTER
----- Message from Brooks Pinead sent at 7/25/2022  1:50 PM EDT -----  Subject: Refill Request    QUESTIONS  Name of Medication? ALPRAZolam (XANAX) 0.5 mg tablet  Patient-reported dosage and instructions? every day - sometimes twice a   day. How many days do you have left? 5  [id number]  Pharmacy phone number (if available)? [phone]  ---------------------------------------------------------------------------  --------------,  Name of Medication? fluticasone propionate (FLONASE) 50 mcg/actuation   nasal spray  Patient-reported dosage and instructions? every morning and at night   How many days do you have left? 5  [id number]  Pharmacy phone number (if available)? [phone]  Additional Information for Provider? scheduled appt today with pcp   ---------------------------------------------------------------------------  --------------  CALL BACK INFO  What is the best way for the office to contact you? OK to leave message on   voicemail  Preferred Call Back Phone Number? [phone]  ---------------------------------------------------------------------------  --------------  SCRIPT ANSWERS  Relationship to Patient?  Self

## 2022-07-25 NOTE — PROGRESS NOTES
Chief Complaint   Patient presents with    Leg Pain    Medication Refill     Pt being seen for back and leg pain  -pt states that she has treated with baclofen and gabapentin  -pt states this has been going on since last week and denies pain  -pt has hx of back issues     1. Have you been to the ER, urgent care clinic since your last visit? Hospitalized since your last visit? No    2. Have you seen or consulted any other health care providers outside of the [address] [place] [address] since your last visit? Include any pap smears or colon screening.  No    Pt has no other concerns

## 2022-07-26 ENCOUNTER — HOSPITAL ENCOUNTER (OUTPATIENT)
Dept: GENERAL RADIOLOGY | Age: 62
Discharge: HOME | End: 2022-07-26
Payer: COMMERCIAL

## 2022-07-26 DIAGNOSIS — M54.31 RIGHT SIDED SCIATICA: ICD-10-CM

## 2022-07-26 PROCEDURE — 72100 X-RAY EXAM L-S SPINE 2/3 VWS: CPT

## 2022-07-30 NOTE — PROGRESS NOTES
HISTORY OF PRESENT ILLNESS  Suha Prabhakar is a 57 y.o. female. HPI  Pt being seen for back and leg pain  -pt states that she has treated with baclofen and gabapentin  -pt states this has been going on since last week and denies pain  -pt has hx of back issues   Needs refill of her xanax for prn anxiety/panic attacks    ROS  A comprehensive review of system was obtained and negative except findings in the HPI    Visit Vitals  BP (!) 168/79 (BP 1 Location: Left upper arm, BP Patient Position: Sitting)   Pulse 61   Temp 98.1 °F (36.7 °C) (Oral)   Resp 18   Ht 5' 2\" (1.575 m)   Wt 154 lb (69.9 kg)   SpO2 98%   BMI 28.17 kg/m²     Physical Exam  Vitals and nursing note reviewed. Constitutional:       Appearance: Normal appearance. She is well-developed. Comments:      Neck:      Vascular: No JVD. Cardiovascular:      Rate and Rhythm: Normal rate and regular rhythm. Heart sounds: Normal heart sounds. No murmur heard. No friction rub. No gallop. Pulmonary:      Effort: Pulmonary effort is normal. No respiratory distress. Breath sounds: Normal breath sounds. No wheezing. Skin:     General: Skin is warm. Neurological:      Mental Status: She is alert and oriented to person, place, and time. ASSESSMENT and PLAN  Encounter Diagnoses   Name Primary? Right sided sciatica Yes    Nerve pain     Anxiety      Orders Placed This Encounter    XR SPINE LUMB 2 OR 3 V    REFERRAL TO PAIN MANAGEMENT    gabapentin (NEURONTIN) 300 mg capsule    ALPRAZolam (XANAX) 0.5 mg tablet    fluticasone propionate (FLONASE) 50 mcg/actuation nasal spray     Referral given for PMR and xrays done as well  Refilled gabapentin at higher dose for improved pain control  Also refilled her xanax for prn use  I have discussed the diagnosis with the patient and the intended plan as seen in the above orders. The patient has received an after-visit summary and questions were answered concerning future plans.  Patient conveyed understanding of the plan at the time of the visit.     Moreno Weller, MSN, ANP  7/30/2022

## 2022-08-01 NOTE — PROGRESS NOTES
The xray shows that the surgery you had is still in good shape. I would recommend PT to see if they can help with spasms and pain. Let me know your thoughts.  Dangelo Dalal

## 2022-08-04 DIAGNOSIS — M54.31 RIGHT SIDED SCIATICA: Primary | ICD-10-CM

## 2022-08-31 DIAGNOSIS — K21.9 GERD WITHOUT ESOPHAGITIS: ICD-10-CM

## 2022-08-31 RX ORDER — PANTOPRAZOLE SODIUM 40 MG/1
TABLET, DELAYED RELEASE ORAL
Qty: 30 TABLET | Refills: 2 | Status: SHIPPED | OUTPATIENT
Start: 2022-08-31

## 2022-09-20 DIAGNOSIS — F41.9 ANXIETY: ICD-10-CM

## 2022-09-20 RX ORDER — ALPRAZOLAM 0.5 MG/1
TABLET ORAL
Qty: 60 TABLET | Refills: 0 | Status: SHIPPED | OUTPATIENT
Start: 2022-09-20

## 2022-10-19 ENCOUNTER — OFFICE VISIT (OUTPATIENT)
Dept: CARDIOLOGY CLINIC | Age: 62
End: 2022-10-19
Payer: COMMERCIAL

## 2022-10-19 VITALS
HEART RATE: 66 BPM | SYSTOLIC BLOOD PRESSURE: 140 MMHG | WEIGHT: 148 LBS | OXYGEN SATURATION: 98 % | BODY MASS INDEX: 27.23 KG/M2 | HEIGHT: 62 IN | DIASTOLIC BLOOD PRESSURE: 88 MMHG

## 2022-10-19 DIAGNOSIS — I10 PRIMARY HYPERTENSION: Primary | ICD-10-CM

## 2022-10-19 DIAGNOSIS — F41.9 ANXIETY: ICD-10-CM

## 2022-10-19 PROCEDURE — 99214 OFFICE O/P EST MOD 30 MIN: CPT | Performed by: SPECIALIST

## 2022-10-19 RX ORDER — ROSUVASTATIN CALCIUM 10 MG/1
10 TABLET, COATED ORAL DAILY
Qty: 90 TABLET | Refills: 3 | Status: CANCELLED | OUTPATIENT
Start: 2022-10-19

## 2022-10-19 RX ORDER — IBUPROFEN 800 MG/1
800 TABLET ORAL 3 TIMES DAILY
COMMUNITY
Start: 2022-10-13

## 2022-10-19 RX ORDER — SPIRONOLACTONE 25 MG/1
25 TABLET ORAL DAILY
Qty: 90 TABLET | Refills: 4 | Status: CANCELLED | OUTPATIENT
Start: 2022-10-19

## 2022-10-19 RX ORDER — AMOXICILLIN 875 MG/1
TABLET, FILM COATED ORAL
COMMUNITY
Start: 2022-10-13

## 2022-10-19 RX ORDER — AMLODIPINE BESYLATE 5 MG/1
5 TABLET ORAL DAILY
Qty: 90 TABLET | Refills: 3 | Status: SHIPPED | OUTPATIENT
Start: 2022-10-19

## 2022-10-19 RX ORDER — CHLORTHALIDONE 25 MG/1
25 TABLET ORAL DAILY
Qty: 90 TABLET | Refills: 3 | Status: CANCELLED | OUTPATIENT
Start: 2022-10-19

## 2022-10-19 NOTE — PROGRESS NOTES
Chief Complaint   Patient presents with    Follow-up     6 months    Hypertension     There were no vitals taken for this visit. Chest pain denied   SOB denied   Palpitations denied   Swelling in hands/feet denied   Dizziness denied   Recent hospital stays denied   Refills denied     Dental surgery was done 10/12.    Vitals:    10/19/22 1059 10/19/22 1109   BP: (!) 150/98 (!) 140/88   BP 1 Location: Left upper arm Right upper arm   BP Patient Position: Sitting Sitting   Pulse: 66    Height: 5' 2\" (1.575 m)    Weight: 148 lb (67.1 kg)    SpO2: 98%

## 2022-10-19 NOTE — PROGRESS NOTES
Raudel Hooker MD. [facility]              Patient: Anusha David  : 1960      Today's Date: 10/19/2022              Patient: Anusha David  : 1960      Today's Date: 10/19/2022          HISTORY OF PRESENT ILLNESS:     History of Present Illness:    Had dental surgery recently. Has had more anxiety. BP remains high at home. PAST MEDICAL HISTORY:     Past Medical History:   Diagnosis Date    Anxiety     Chronic pain     Dyslipidemia     H/O spinal fusion     Hypertension     Microcytic anemia 3/25/2021    Non-nicotine vapor product user     Obesity     s/p weight loss surgery     Psychiatric disorder     anxiety    Smoker        Past Surgical History:   Procedure Laterality Date    HC LAP BAND ADJUST PROCEDURE      HX BREAST BIOPSY Left     years ago    HX BREAST BIOPSY Right 2018    Benign    HX CERVICAL FUSION      HX CHOLECYSTECTOMY      HX GASTRIC BYPASS      HX HYSTERECTOMY      HX ORTHOPAEDIC      foot surgery--bunions--bilateral    HX ORTHOPAEDIC      ACDF    NE REMOVAL GALLBLADDER           MEDICATIONS:     Current Outpatient Medications   Medication Sig Dispense Refill    amoxicillin (AMOXIL) 875 mg tablet TAKE 1 TABLET BY MOUTH TWICE DAILY UNTIL ALL TAKEN      ibuprofen (MOTRIN) 800 mg tablet Take 800 mg by mouth three (3) times daily. ALPRAZolam (XANAX) 0.5 mg tablet TAKE 1 TABLET BY MOUTH TWICE DAILY AS NEEDED FOR ANXIETY 60 Tablet 0    pantoprazole (PROTONIX) 40 mg tablet TAKE 1 TABLET BY MOUTH DAILY 30 Tablet 2    gabapentin (NEURONTIN) 300 mg capsule Take 1 Capsule by mouth three (3) times daily. Max Daily Amount: 900 mg. (Patient taking differently: Take 300 mg by mouth three (3) times daily. As needed) 90 Capsule 1    fluticasone propionate (FLONASE) 50 mcg/actuation nasal spray Take 2 Sprays by Both Nostrils route in the morning.  1 Each 5    lisinopriL (PRINIVIL, ZESTRIL) 20 mg tablet TAKE 1 TABLET BY MOUTH DAILY 90 Tablet 1    rosuvastatin (CRESTOR) 10 mg tablet Take 1 Tablet by mouth daily. 90 Tablet 3    aspirin/salicylamide/caffeine (BC HEADACHE POWDER PO) Take 1 Package by mouth. azelastine (ASTELIN) 137 mcg (0.1 %) nasal spray 1 Spray as needed for Rhinitis. Use in each nostril as directed      cromolyn (OPTICROM) 4 % ophthalmic solution Administer 1 Drop to both eyes four (4) times daily. Use in affected eye(s) 10 mL 0    meloxicam (MOBIC) 7.5 mg tablet Take 1 Tab by mouth daily. (Patient not taking: Reported on 10/19/2022) 30 Tab 5       No Known Allergies          SOCIAL HISTORY:     Social History     Tobacco Use    Smoking status: Every Day     Packs/day: 0.50     Years: 25.00     Pack years: 12.50     Types: Cigarettes    Smokeless tobacco: Never   Substance Use Topics    Alcohol use: Yes     Alcohol/week: 0.0 standard drinks     Comment: rare/1-2 per month    Drug use: No         FAMILY HISTORY:     Family History   Problem Relation Age of Onset    Diabetes Mother     Heart Disease Mother     Diabetes Father     Heart Disease Father     Other Sister         IBS/bowel intestine problems    Breast Cancer Sister 72    Breast Cancer Paternal Aunt         3 paunts, 5 pcousins           REVIEW OF SYMPTOMS:     Review of Symptoms:  Constitutional: Negative for fever, chills  HEENT: Negative for nosebleeds, tinnitus, and vision changes. Respiratory: Negative for cough, wheezing  Cardiovascular: Negative for orthopnea, claudication, syncope, and PND. Gastrointestinal: Negative for abdominal pain, diarrhea, melena. Genitourinary: Negative for dysuria  Musculoskeletal: Negative for myalgias. Skin: Negative for rash  Heme: No problems bleeding. Neurological: Negative for speech change and focal weakness.          PHYSICAL EXAM:     Physical Exam:  Visit Vitals  BP (!) 140/88 (BP 1 Location: Right upper arm, BP Patient Position: Sitting)   Pulse 66   Ht 5' 2\" (1.575 m)   Wt 148 lb (67.1 kg)   SpO2 98%   BMI 27.07 kg/m²     Patient appears generally well, mood and affect are appropriate and pleasant. HEENT:  Hearing intact, non-icteric, normocephalic, atraumatic. Neck Exam: Supple, No JVD or carotid bruits. Lung Exam: Clear to auscultation, even breath sounds. Cardiac Exam: Regular rate and rhythm with 2/6 systolic murmur   Abdomen: Soft, non-tender, normal bowel sounds  Extremities: Moves all ext well. No lower extremity edema. MSKTL: Overall good ROM ext  Skin: No significant rashes  Vascular: palpable dorsalis pedis pulses bilaterally. Psych: Appropriate affect  Neuro - Grossly intact      LABS / OTHER STUDIES reviewed:     Lab Results   Component Value Date/Time    Sodium 140 04/12/2022 03:47 PM    Potassium 4.6 04/12/2022 03:47 PM    Chloride 108 04/12/2022 03:47 PM    CO2 27 04/12/2022 03:47 PM    Anion gap 5 04/12/2022 03:47 PM    Glucose 83 04/12/2022 03:47 PM    BUN 18 04/12/2022 03:47 PM    Creatinine 1.03 (H) 04/12/2022 03:47 PM    BUN/Creatinine ratio 17 04/12/2022 03:47 PM    GFR est AA >60 04/12/2022 03:47 PM    GFR est non-AA 54 (L) 04/12/2022 03:47 PM    Calcium 9.7 04/12/2022 03:47 PM    Bilirubin, total 0.2 04/12/2022 03:47 PM    Alk.  phosphatase 110 04/12/2022 03:47 PM    Protein, total 6.8 04/12/2022 03:47 PM    Protein, total 6.2 04/12/2022 03:47 PM    Albumin 3.8 04/12/2022 03:47 PM    Globulin 3.0 04/12/2022 03:47 PM    A-G Ratio 1.3 04/12/2022 03:47 PM    ALT (SGPT) 13 04/12/2022 03:47 PM    AST (SGOT) 12 (L) 04/12/2022 03:47 PM     Lab Results   Component Value Date/Time    WBC 5.4 04/12/2022 03:47 PM    HGB 14.2 04/12/2022 03:47 PM    HCT 44.4 04/12/2022 03:47 PM    PLATELET 877 19/57/6923 03:47 PM    .7 (H) 04/12/2022 03:47 PM       Lab Results   Component Value Date/Time    Cholesterol, total 253 (H) 02/07/2022 12:00 AM    HDL Cholesterol 100 02/07/2022 12:00 AM    LDL, calculated 145 (H) 02/07/2022 12:00 AM    LDL, calculated 103.4 (H) 03/02/2021 09:19 AM    VLDL, calculated 8 02/07/2022 12:00 AM    VLDL, calculated 7.6 03/02/2021 09:19 AM    Triglyceride 50 02/07/2022 12:00 AM    CHOL/HDL Ratio 2.1 03/02/2021 09:19 AM       Lab Results   Component Value Date/Time    TSH 0.992 02/07/2022 12:00 AM           CARDIAC DIAGNOSTICS:     Cardiac Evaluation Includes:  I reviewed the results below. EKG 2/7/22 - NSR, possible LAE  EKG 2/22/22 - NSR, possible LAE      Echo 5/12 - LVEF 55-60%  CXR 2/21/22 - clear     Echo 3/21/22 - LVEF 61%, LAE, AV sclerosis. Interatrial Septum: The septum is bowing into the RA. IVC diameter is greater than 21 mm and decreases less than 50% during inspiration; therefore the estimated right atrial pressure is elevated (~15 mmHg). Exercise cardiolite 3/21/22 - walked 4 min (7 METS), no CP or ischemic EKG changes. LVEF 70%. ASSESSMENT AND PLAN:     Assessment and Plan:    1) Atypical CP history   - Echo 3/21/22 - LVEF 61%, LAE, AV sclerosis. IVD dilated   - Exercise cardiolite 3/21/22 - walked 4 min (7 METS), no CP or ischemic EKG changes. LVEF 70%. - Lately doing better without CP or sig SOB     2) CV risk   - she is at high risk long term with her smoking and FH  - Needs risk factor control   - Agree with a statin and ASA     3) HTN  - BP remains high ---> she is not taking norvasc, chlorthalidone or aldactone -- only takes lisinopril --> says she hates taking medications. ---> Will have her start norvasc 5 mg daily and work with PCP on increasing Norvasc dose and then adding low dose chlorthaliodne 12.5 mg and then aldactone as needed. - goal BP < 130/80     4) Dyslipidemia   - she has not taken her statin in a while --- hates taking meds. Recommended she take a statin and work with PCP     4) Smoking cessation discussed     5) Esophageal issues   - seeing Dr. Liz Ogden - had an EGD    6) Anemia   - seeing hematology     7) See me in 3-6 months     Retiring in March [address] [facility] Birth Certificates).         Portia Rosado MD, [address] [place] [address]. [address] [facility]  [address] [place] [address], [address]  [place] [address]  [address]  [place], [address] [place] [address] Drive  [address]  Ph: [phone]    [phone]

## 2022-11-01 RX ORDER — LISINOPRIL 20 MG/1
20 TABLET ORAL DAILY
Qty: 90 TABLET | Refills: 2 | Status: SHIPPED | OUTPATIENT
Start: 2022-11-01

## 2022-11-01 NOTE — TELEPHONE ENCOUNTER
Refill per VO of Dr. Janeth Mendez  Last appt: 10/19/2022  Future Appointments   Date Time Provider Neena Feldman   5/2/2023 10:20 AM MD VANNA Correa BS AMB       Requested Prescriptions     Signed Prescriptions Disp Refills    lisinopriL (PRINIVIL, ZESTRIL) 20 mg tablet 90 Tablet 2     Sig: TAKE 1 TABLET BY MOUTH DAILY     Authorizing Provider: Neo Mckeon     Ordering User: Conor Zaragoza

## 2022-11-22 ENCOUNTER — OFFICE VISIT (OUTPATIENT)
Dept: FAMILY MEDICINE CLINIC | Age: 62
End: 2022-11-22
Payer: COMMERCIAL

## 2022-11-22 VITALS
RESPIRATION RATE: 14 BRPM | DIASTOLIC BLOOD PRESSURE: 91 MMHG | OXYGEN SATURATION: 99 % | TEMPERATURE: 98 F | WEIGHT: 145 LBS | HEIGHT: 62 IN | BODY MASS INDEX: 26.68 KG/M2 | HEART RATE: 72 BPM | SYSTOLIC BLOOD PRESSURE: 179 MMHG

## 2022-11-22 DIAGNOSIS — F17.200 SMOKER: ICD-10-CM

## 2022-11-22 DIAGNOSIS — I10 PRIMARY HYPERTENSION: ICD-10-CM

## 2022-11-22 DIAGNOSIS — J06.9 ACUTE UPPER RESPIRATORY INFECTION: Primary | ICD-10-CM

## 2022-11-22 PROCEDURE — 3078F DIAST BP <80 MM HG: CPT | Performed by: FAMILY MEDICINE

## 2022-11-22 PROCEDURE — 99214 OFFICE O/P EST MOD 30 MIN: CPT | Performed by: FAMILY MEDICINE

## 2022-11-22 PROCEDURE — 3074F SYST BP LT 130 MM HG: CPT | Performed by: FAMILY MEDICINE

## 2022-11-22 RX ORDER — ALBUTEROL SULFATE 90 UG/1
2 AEROSOL, METERED RESPIRATORY (INHALATION)
Qty: 1 EACH | Refills: 1 | Status: SHIPPED | OUTPATIENT
Start: 2022-11-22 | End: 2023-11-17

## 2022-11-22 RX ORDER — BENZONATATE 100 MG/1
100 CAPSULE ORAL
Qty: 60 CAPSULE | Refills: 1 | Status: SHIPPED | OUTPATIENT
Start: 2022-11-22 | End: 2022-11-29

## 2022-11-22 RX ORDER — CEFDINIR 300 MG/1
300 CAPSULE ORAL 2 TIMES DAILY
Qty: 20 CAPSULE | Refills: 0 | Status: SHIPPED | OUTPATIENT
Start: 2022-11-22 | End: 2022-12-02

## 2022-11-22 NOTE — PROGRESS NOTES
Chief Complaint   Patient presents with    Wheezing     Eyes itching    Cough     Sinus congestion: X 6 days     1. Have you been to the ER, urgent care clinic since your last visit? Hospitalized since your last visit? No    2. Have you seen or consulted any other health care providers outside of the [address] [place] [address] since your last visit? Include any pap smears or colon screening.  No

## 2022-11-22 NOTE — PROGRESS NOTES
Chief Complaint   Patient presents with    Wheezing     Eyes itching    Cough     Sinus congestion: X 6 days     1. Have you been to the ER, urgent care clinic since your last visit? Hospitalized since your last visit? No    2. Have you seen or consulted any other health care providers outside of the [address] [place] [address] since your last visit? Include any pap smears or colon screening. No             Chief Complaint   Patient presents with    Wheezing     Eyes itching    Cough     Sinus congestion: X 6 days     She is a 60 y.o. female who presents for evalution. Reviewed PmHx, RxHx, FmHx, SocHx, AllgHx and updated and dated in the chart. Patient Active Problem List    Diagnosis    Smoker    Iron deficiency anemia    Monoclonal gammopathy    Microcytic anemia    Spondylolisthesis of lumbar region    S/P lumbar spinal fusion    GERD without esophagitis    Anxiety    HTN (hypertension)       Review of Systems - negative except as listed above in the HPI    Objective:     Vitals:    11/22/22 1432   BP: (!) 179/91   Pulse: 72   Resp: 14   Temp: 98 °F (36.7 °C)   TempSrc: Oral   SpO2: 99%   Weight: 145 lb (65.8 kg)   Height: 5' 2\" (1.575 m)         Assessment/ Plan:   Diagnoses and all orders for this visit:    1. Acute upper respiratory infection  -     albuterol (PROVENTIL HFA, VENTOLIN HFA, PROAIR HFA) 90 mcg/actuation inhaler; Take 2 Puffs by inhalation every four (4) hours as needed for Wheezing for up to 360 days. -     benzonatate (Tessalon Perles) 100 mg capsule; Take 1 Capsule by mouth three (3) times daily as needed for Cough for up to 7 days. -     cefdinir (OMNICEF) 300 mg capsule; Take 1 Capsule by mouth two (2) times a day for 10 days. Patient with several day history of productive cough. Examination reveals bilateral rhonchi. O2 sats are good. Add medicine as above. 2. Primary hypertension  Patient not feeling well therefore blood pressure is elevated.   Recheck when she is feeling better  3. Smoker  Discussed patient the need to discontinue smoking. If not better will get x-ray. I have discussed the diagnosis with the patient and the intended plan as seen in the above orders. The patient understands and agrees with the plan. The patient has received an after-visit summary and questions were answered concerning future plans. Medication Side Effects and Warnings were discussed with patient  Patient Labs were reviewed and or requested:  Patient Past Records were reviewed and or requested    Brijesh Banuelos M.D. There are no Patient Instructions on file for this visit.

## 2022-11-24 DIAGNOSIS — K21.9 GERD WITHOUT ESOPHAGITIS: ICD-10-CM

## 2022-11-26 RX ORDER — PANTOPRAZOLE SODIUM 40 MG/1
TABLET, DELAYED RELEASE ORAL
Qty: 30 TABLET | Refills: 2 | Status: SHIPPED | OUTPATIENT
Start: 2022-11-26

## 2023-01-18 DIAGNOSIS — F41.9 ANXIETY: ICD-10-CM

## 2023-01-18 RX ORDER — ALPRAZOLAM 0.5 MG/1
TABLET ORAL
Qty: 60 TABLET | Refills: 3 | Status: SHIPPED | OUTPATIENT
Start: 2023-01-18

## 2023-01-18 RX ORDER — ALPRAZOLAM 0.5 MG/1
TABLET ORAL
Qty: 60 TABLET | OUTPATIENT
Start: 2023-01-18

## 2023-01-23 RX ORDER — FLUTICASONE PROPIONATE 50 MCG
SPRAY, SUSPENSION (ML) NASAL
Qty: 16 G | Refills: 1 | Status: SHIPPED | OUTPATIENT
Start: 2023-01-23

## 2023-02-22 DIAGNOSIS — K21.9 GERD WITHOUT ESOPHAGITIS: ICD-10-CM

## 2023-02-22 RX ORDER — PANTOPRAZOLE SODIUM 40 MG/1
TABLET, DELAYED RELEASE ORAL
Qty: 30 TABLET | Refills: 2 | Status: SHIPPED | OUTPATIENT
Start: 2023-02-22

## 2023-02-23 DIAGNOSIS — M79.2 NERVE PAIN: ICD-10-CM

## 2023-02-23 RX ORDER — AMLODIPINE BESYLATE 10 MG/1
10 TABLET ORAL DAILY
Qty: 90 TABLET | Refills: 0 | Status: SHIPPED | OUTPATIENT
Start: 2023-02-23

## 2023-02-23 RX ORDER — GABAPENTIN 300 MG/1
300 CAPSULE ORAL 3 TIMES DAILY
Qty: 90 CAPSULE | Refills: 1 | OUTPATIENT
Start: 2023-02-23

## 2023-02-23 NOTE — TELEPHONE ENCOUNTER
Refill per VO of Dr. Montalvo Standard  Last appt: 10/19/2022  Future Appointments   Date Time Provider Neena Feldman   5/2/2023 10:20 AM MD VANNA Mai AMB       Requested Prescriptions     Pending Prescriptions Disp Refills    amLODIPine (NORVASC) 10 mg tablet [Pharmacy Med Name: AMLODIPINE BESYLATE 10MG TABLETS] 90 Tablet 3     Sig: TAKE 1 TABLET BY MOUTH DAILY

## 2023-04-23 RX ORDER — FLUTICASONE PROPIONATE 50 MCG
SPRAY, SUSPENSION (ML) NASAL
Qty: 16 G | Refills: 1 | Status: SHIPPED | OUTPATIENT
Start: 2023-04-23

## 2023-05-02 ENCOUNTER — DOCUMENTATION ONLY (OUTPATIENT)
Dept: CARDIOLOGY CLINIC | Age: 63
End: 2023-05-02

## 2023-05-02 ENCOUNTER — OFFICE VISIT (OUTPATIENT)
Dept: CARDIOLOGY CLINIC | Age: 63
End: 2023-05-02
Payer: COMMERCIAL

## 2023-05-02 VITALS
SYSTOLIC BLOOD PRESSURE: 135 MMHG | DIASTOLIC BLOOD PRESSURE: 70 MMHG | WEIGHT: 148 LBS | HEIGHT: 62 IN | BODY MASS INDEX: 27.23 KG/M2 | OXYGEN SATURATION: 99 % | HEART RATE: 63 BPM

## 2023-05-02 DIAGNOSIS — E78.5 DYSLIPIDEMIA: ICD-10-CM

## 2023-05-02 DIAGNOSIS — I10 PRIMARY HYPERTENSION: Primary | ICD-10-CM

## 2023-05-02 DIAGNOSIS — D50.9 MICROCYTIC ANEMIA: ICD-10-CM

## 2023-05-02 PROCEDURE — 99214 OFFICE O/P EST MOD 30 MIN: CPT | Performed by: SPECIALIST

## 2023-05-02 PROCEDURE — 3078F DIAST BP <80 MM HG: CPT | Performed by: SPECIALIST

## 2023-05-02 PROCEDURE — 3074F SYST BP LT 130 MM HG: CPT | Performed by: SPECIALIST

## 2023-05-02 RX ORDER — ROSUVASTATIN CALCIUM 10 MG/1
10 TABLET, COATED ORAL DAILY
Qty: 90 TABLET | Refills: 3 | Status: SHIPPED | OUTPATIENT
Start: 2023-05-02

## 2023-05-02 RX ORDER — LISINOPRIL 20 MG/1
20 TABLET ORAL 2 TIMES DAILY
Qty: 180 TABLET | Refills: 3 | Status: SHIPPED | OUTPATIENT
Start: 2023-05-02

## 2023-05-20 LAB
ALBUMIN SERPL-MCNC: 3.6 G/DL (ref 3.5–5)
ALBUMIN/GLOB SERPL: 1.4 (ref 1.1–2.2)
ALP SERPL-CCNC: 98 U/L (ref 45–117)
ALT SERPL-CCNC: 9 U/L (ref 12–78)
ANION GAP SERPL CALC-SCNC: ABNORMAL MMOL/L (ref 5–15)
AST SERPL-CCNC: 10 U/L (ref 15–37)
BILIRUB SERPL-MCNC: 0.2 MG/DL (ref 0.2–1)
BUN SERPL-MCNC: 15 MG/DL (ref 6–20)
BUN/CREAT SERPL: 21 (ref 12–20)
CALCIUM SERPL-MCNC: 8.7 MG/DL (ref 8.5–10.1)
CHLORIDE SERPL-SCNC: 116 MMOL/L (ref 97–108)
CHOLEST SERPL-MCNC: 173 MG/DL
CO2 SERPL-SCNC: 26 MMOL/L (ref 21–32)
CREAT SERPL-MCNC: 0.7 MG/DL (ref 0.55–1.02)
ERYTHROCYTE [DISTWIDTH] IN BLOOD BY AUTOMATED COUNT: 16.5 % (ref 11.5–14.5)
GLOBULIN SER CALC-MCNC: 2.6 G/DL (ref 2–4)
GLUCOSE SERPL-MCNC: 72 MG/DL (ref 65–100)
HCT VFR BLD AUTO: 39.7 % (ref 35–47)
HDLC SERPL-MCNC: 100 MG/DL
HDLC SERPL: 1.7 (ref 0–5)
HGB BLD-MCNC: 12.3 G/DL (ref 11.5–16)
LDLC SERPL CALC-MCNC: 65.4 MG/DL (ref 0–100)
MCH RBC QN AUTO: 32.5 PG (ref 26–34)
MCHC RBC AUTO-ENTMCNC: 31 G/DL (ref 30–36.5)
MCV RBC AUTO: 105 FL (ref 80–99)
NRBC # BLD: 0 K/UL (ref 0–0.01)
NRBC BLD-RTO: 0 PER 100 WBC
PLATELET # BLD AUTO: 268 K/UL (ref 150–400)
PMV BLD AUTO: 11.8 FL (ref 8.9–12.9)
POTASSIUM SERPL-SCNC: 4.1 MMOL/L (ref 3.5–5.1)
PROT SERPL-MCNC: 6.2 G/DL (ref 6.4–8.2)
RBC # BLD AUTO: 3.78 M/UL (ref 3.8–5.2)
SODIUM SERPL-SCNC: 141 MMOL/L (ref 136–145)
TRIGL SERPL-MCNC: 38 MG/DL
TSH SERPL DL<=0.05 MIU/L-ACNC: 0.33 UIU/ML (ref 0.36–3.74)
VLDLC SERPL CALC-MCNC: 7.6 MG/DL
WBC # BLD AUTO: 4.2 K/UL (ref 3.6–11)

## 2023-05-23 RX ORDER — PANTOPRAZOLE SODIUM 40 MG/1
TABLET, DELAYED RELEASE ORAL
Qty: 30 TABLET | Refills: 1 | Status: SHIPPED | OUTPATIENT
Start: 2023-05-23

## 2023-05-23 RX ORDER — AMLODIPINE BESYLATE 10 MG/1
TABLET ORAL
Qty: 90 TABLET | Refills: 1 | Status: SHIPPED | OUTPATIENT
Start: 2023-05-23

## 2023-05-23 NOTE — TELEPHONE ENCOUNTER
Refill per VO of Dr. Bi Brown  Last appt: 5/2/2023    Future Appointments   Date Time Provider Marlyn Carlin   6/15/2023 11:00 AM KATIE Verduzco - [facility]UC OLMOS       Requested Prescriptions     Signed Prescriptions Disp Refills    amLODIPine (NORVASC) 10 MG tablet 90 tablet 1     Sig: TAKE 1 TABLET BY MOUTH DAILY     Authorizing Provider: Katiuska Landeros     Ordering User: Irineo Hui

## 2023-07-20 ENCOUNTER — OFFICE VISIT (OUTPATIENT)
Age: 63
End: 2023-07-20
Payer: COMMERCIAL

## 2023-07-20 VITALS
WEIGHT: 137 LBS | BODY MASS INDEX: 25.21 KG/M2 | DIASTOLIC BLOOD PRESSURE: 74 MMHG | SYSTOLIC BLOOD PRESSURE: 138 MMHG | OXYGEN SATURATION: 98 % | HEIGHT: 62 IN | HEART RATE: 61 BPM

## 2023-07-20 DIAGNOSIS — I10 ESSENTIAL (PRIMARY) HYPERTENSION: Primary | ICD-10-CM

## 2023-07-20 DIAGNOSIS — E78.5 HYPERLIPIDEMIA, UNSPECIFIED HYPERLIPIDEMIA TYPE: ICD-10-CM

## 2023-07-20 DIAGNOSIS — F17.210 CIGARETTE NICOTINE DEPENDENCE WITHOUT COMPLICATION: ICD-10-CM

## 2023-07-20 PROCEDURE — 99214 OFFICE O/P EST MOD 30 MIN: CPT

## 2023-07-20 PROCEDURE — 3078F DIAST BP <80 MM HG: CPT

## 2023-07-20 PROCEDURE — 3075F SYST BP GE 130 - 139MM HG: CPT

## 2023-07-20 RX ORDER — CHLORTHALIDONE 25 MG/1
12.5 TABLET ORAL DAILY
Qty: 30 TABLET | Refills: 1 | Status: SHIPPED | OUTPATIENT
Start: 2023-07-20

## 2023-07-20 NOTE — PROGRESS NOTES
Chief Complaint   Patient presents with    Follow-up     1 month    Hypertension     Vitals:    07/20/23 0930   BP: 138/74   Site: Left Upper Arm   Position: Sitting   Cuff Size: Medium Adult   Pulse: 61   SpO2: 98%   Weight: 137 lb (62.1 kg)   Height: 5' 2\" (1.575 m)     Chest pain center of chest once in a while   SOB denied   Palpitations denied   Swelling in hands/feet denied   Dizziness slight; having a lot of sinus/allergy issues   Recent hospital stays denied   Refills denied     Having issues with acid reflux, medicine is helping. Cutting back smoking. Eyes puffy.

## 2023-07-24 RX ORDER — PANTOPRAZOLE SODIUM 40 MG/1
TABLET, DELAYED RELEASE ORAL
Qty: 30 TABLET | Refills: 1 | Status: SHIPPED | OUTPATIENT
Start: 2023-07-24

## 2023-07-25 RX ORDER — LISINOPRIL 20 MG/1
TABLET ORAL
Qty: 90 TABLET | Refills: 3 | Status: SHIPPED | OUTPATIENT
Start: 2023-07-25

## 2023-07-25 NOTE — TELEPHONE ENCOUNTER
Refill per VO of Dr. Jessica Alvares  Last appt: 7/20/2023    Future Appointments   Date Time Provider [address]   8/24/2023  9:40 AM KATIE Guerrero NP   2/20/2024  9:00 AM MD DIANA Hager       Requested Prescriptions     Signed Prescriptions Disp Refills    lisinopril (PRINIVIL;ZESTRIL) 20 MG tablet 90 tablet 3     Sig: TAKE 1 TABLET BY MOUTH DAILY     Authorizing Provider: Kai Brennan     Ordering User: Phuong Powers

## 2023-08-21 DIAGNOSIS — I10 ESSENTIAL (PRIMARY) HYPERTENSION: ICD-10-CM

## 2023-08-22 LAB
ANION GAP SERPL CALC-SCNC: 2 MMOL/L (ref 5–15)
BUN SERPL-MCNC: 13 MG/DL (ref 6–20)
BUN/CREAT SERPL: 18 (ref 12–20)
CALCIUM SERPL-MCNC: 9.3 MG/DL (ref 8.5–10.1)
CHLORIDE SERPL-SCNC: 116 MMOL/L (ref 97–108)
CO2 SERPL-SCNC: 24 MMOL/L (ref 21–32)
CREAT SERPL-MCNC: 0.71 MG/DL (ref 0.55–1.02)
GLUCOSE SERPL-MCNC: 75 MG/DL (ref 65–100)
POTASSIUM SERPL-SCNC: 4.1 MMOL/L (ref 3.5–5.1)
SODIUM SERPL-SCNC: 142 MMOL/L (ref 136–145)

## 2023-08-22 NOTE — RESULT ENCOUNTER NOTE
Dear Ms. Blanca Jenkins! Your test results are stable. Please let me know if you have any questions.    Best Regards,  Cyn Abernathy, APRN - NP

## 2023-08-24 ENCOUNTER — OFFICE VISIT (OUTPATIENT)
Age: 63
End: 2023-08-24
Payer: COMMERCIAL

## 2023-08-24 ENCOUNTER — TELEPHONE (OUTPATIENT)
Age: 63
End: 2023-08-24

## 2023-08-24 VITALS
BODY MASS INDEX: 25.76 KG/M2 | HEART RATE: 64 BPM | HEIGHT: 62 IN | SYSTOLIC BLOOD PRESSURE: 128 MMHG | DIASTOLIC BLOOD PRESSURE: 78 MMHG | WEIGHT: 140 LBS

## 2023-08-24 DIAGNOSIS — I10 ESSENTIAL (PRIMARY) HYPERTENSION: Primary | ICD-10-CM

## 2023-08-24 DIAGNOSIS — F17.210 CIGARETTE NICOTINE DEPENDENCE WITHOUT COMPLICATION: ICD-10-CM

## 2023-08-24 DIAGNOSIS — E78.5 HYPERLIPIDEMIA, UNSPECIFIED HYPERLIPIDEMIA TYPE: ICD-10-CM

## 2023-08-24 PROCEDURE — 3078F DIAST BP <80 MM HG: CPT

## 2023-08-24 PROCEDURE — 99214 OFFICE O/P EST MOD 30 MIN: CPT

## 2023-08-24 PROCEDURE — 3074F SYST BP LT 130 MM HG: CPT

## 2023-08-24 RX ORDER — LISINOPRIL 20 MG/1
20 TABLET ORAL 2 TIMES DAILY
Qty: 180 TABLET | Refills: 3 | Status: SHIPPED | OUTPATIENT
Start: 2023-08-24

## 2023-08-24 ASSESSMENT — PATIENT HEALTH QUESTIONNAIRE - PHQ9
SUM OF ALL RESPONSES TO PHQ QUESTIONS 1-9: 0
2. FEELING DOWN, DEPRESSED OR HOPELESS: 0
SUM OF ALL RESPONSES TO PHQ9 QUESTIONS 1 & 2: 0
1. LITTLE INTEREST OR PLEASURE IN DOING THINGS: 0
SUM OF ALL RESPONSES TO PHQ QUESTIONS 1-9: 0

## 2023-08-24 NOTE — PROGRESS NOTES
Roz Chacko is a 58 y.o. female    had concerns including Hypertension and Cholesterol Problem. Vitals:    08/24/23 1001   BP: 128/78   Site: Left Upper Arm   Position: Sitting   Pulse: 64   Weight: 140 lb (63.5 kg)   Height: 5' 2\" (1.575 m)        Chest pain No    SOB No    Dizziness No    Swelling slightly in her ankles at times     Refills No    Covid Vaccinated yes      1. Have you been to the ER, urgent care clinic since your last visit? Hospitalized since your last visit? no    2. Have you seen or consulted any other health care providers outside of the [address] [place] [address] since your last visit? Include any pap smears or colon screening.  no
7)  See Dr. Peter Gnogora in 6 months      Retiring in March [facility] [facility] Birth Certificates). Going to [facility] in August 2023. Fito Key, KATIE - NP    [address], [address]  [facility] [address]  [address], [address] [place] [address]  Ph: [phone]   Ph [phone]

## 2023-08-24 NOTE — TELEPHONE ENCOUNTER
Pt called forgot inform Nurse she needs refill for her medication Lisinopril 20 mg sent to [facility]#[phone] Patient is completely out of her medication    #[phone]

## 2023-08-25 RX ORDER — LISINOPRIL 20 MG/1
TABLET ORAL
Qty: 90 TABLET | Refills: 3 | OUTPATIENT
Start: 2023-08-25

## 2023-08-25 NOTE — TELEPHONE ENCOUNTER
Refill per VO of Dr. Peter Gongora  Last appt: 5/2/2023    Future Appointments   Date Time Provider [address]   2/20/2024  9:00 AM MD DIANA Aranda BS AMB       Requested Prescriptions     Refused Prescriptions Disp Refills    lisinopril (PRINIVIL;ZESTRIL) 20 MG tablet [Pharmacy Med Name: LISINOPRIL 20MG TABLETS] 90 tablet 3     Sig: TAKE 1 TABLET BY MOUTH DAILY     Refused By: Elizabeth Dickinson     Reason for Refusal: Request already responded to by other means (e.g. phone or fax)

## 2023-09-07 ENCOUNTER — OFFICE VISIT (OUTPATIENT)
Age: 63
End: 2023-09-07
Payer: COMMERCIAL

## 2023-09-07 VITALS
WEIGHT: 141.4 LBS | HEIGHT: 62 IN | HEART RATE: 68 BPM | SYSTOLIC BLOOD PRESSURE: 139 MMHG | BODY MASS INDEX: 26.02 KG/M2 | DIASTOLIC BLOOD PRESSURE: 70 MMHG

## 2023-09-07 DIAGNOSIS — R60.0 BILATERAL LOWER EXTREMITY EDEMA: ICD-10-CM

## 2023-09-07 DIAGNOSIS — E78.5 HYPERLIPIDEMIA, UNSPECIFIED HYPERLIPIDEMIA TYPE: ICD-10-CM

## 2023-09-07 DIAGNOSIS — F17.210 CIGARETTE NICOTINE DEPENDENCE WITHOUT COMPLICATION: ICD-10-CM

## 2023-09-07 DIAGNOSIS — I10 ESSENTIAL (PRIMARY) HYPERTENSION: Primary | ICD-10-CM

## 2023-09-07 PROCEDURE — 3075F SYST BP GE 130 - 139MM HG: CPT

## 2023-09-07 PROCEDURE — 3078F DIAST BP <80 MM HG: CPT

## 2023-09-07 PROCEDURE — 99214 OFFICE O/P EST MOD 30 MIN: CPT

## 2023-09-07 RX ORDER — FUROSEMIDE 20 MG/1
20 TABLET ORAL DAILY PRN
Qty: 30 TABLET | Refills: 1 | Status: SHIPPED | OUTPATIENT
Start: 2023-09-07

## 2023-09-07 NOTE — PATIENT INSTRUCTIONS
Please take lasix 20 mg daily x 3 days. You can take longer than 3 days for increased leg swelling. Please let me know if things do not improve. Continue to monitor blood pressure at home - if BP remains greater than 130 (top number) please call the office.

## 2023-09-08 ENCOUNTER — TELEPHONE (OUTPATIENT)
Age: 63
End: 2023-09-08

## 2023-09-08 NOTE — TELEPHONE ENCOUNTER
Roz Chacko needs a refill of Alprazolam.  They have 3 pills/supply left and are requesting a 30 day supply with refills. Pharmacy has been updated in the chart. Patient was advised or scheduled an appointment for the future and to request refills thru the [facility] or by requesting a refill from their pharmacy in the future. Patient was also advised to check with their pharmacy for status of when refills are available.

## 2023-09-12 ENCOUNTER — OFFICE VISIT (OUTPATIENT)
Age: 63
End: 2023-09-12
Payer: COMMERCIAL

## 2023-09-12 VITALS
RESPIRATION RATE: 16 BRPM | SYSTOLIC BLOOD PRESSURE: 137 MMHG | DIASTOLIC BLOOD PRESSURE: 75 MMHG | HEART RATE: 67 BPM | OXYGEN SATURATION: 96 % | TEMPERATURE: 98.3 F | BODY MASS INDEX: 25.21 KG/M2 | HEIGHT: 62 IN | WEIGHT: 137 LBS

## 2023-09-12 DIAGNOSIS — F41.9 ANXIETY: ICD-10-CM

## 2023-09-12 DIAGNOSIS — I10 PRIMARY HYPERTENSION: Primary | ICD-10-CM

## 2023-09-12 PROCEDURE — 3078F DIAST BP <80 MM HG: CPT | Performed by: FAMILY MEDICINE

## 2023-09-12 PROCEDURE — 99213 OFFICE O/P EST LOW 20 MIN: CPT | Performed by: FAMILY MEDICINE

## 2023-09-12 PROCEDURE — 3075F SYST BP GE 130 - 139MM HG: CPT | Performed by: FAMILY MEDICINE

## 2023-09-12 RX ORDER — PANTOPRAZOLE SODIUM 40 MG/1
40 TABLET, DELAYED RELEASE ORAL DAILY
Qty: 90 TABLET | Refills: 3 | Status: SHIPPED | OUTPATIENT
Start: 2023-09-12

## 2023-09-12 RX ORDER — ALPRAZOLAM 0.5 MG/1
TABLET ORAL
Qty: 45 TABLET | Refills: 5 | Status: SHIPPED | OUTPATIENT
Start: 2023-09-12 | End: 2024-08-28

## 2023-09-12 SDOH — ECONOMIC STABILITY: HOUSING INSECURITY
IN THE LAST 12 MONTHS, WAS THERE A TIME WHEN YOU DID NOT HAVE A STEADY PLACE TO SLEEP OR SLEPT IN A SHELTER (INCLUDING NOW)?: NO

## 2023-09-12 SDOH — ECONOMIC STABILITY: TRANSPORTATION INSECURITY
IN THE PAST 12 MONTHS, HAS LACK OF TRANSPORTATION KEPT YOU FROM MEETINGS, WORK, OR FROM GETTING THINGS NEEDED FOR DAILY LIVING?: NO

## 2023-09-12 SDOH — ECONOMIC STABILITY: FOOD INSECURITY: WITHIN THE PAST 12 MONTHS, THE FOOD YOU BOUGHT JUST DIDN'T LAST AND YOU DIDN'T HAVE MONEY TO GET MORE.: NEVER TRUE

## 2023-09-12 SDOH — ECONOMIC STABILITY: INCOME INSECURITY: HOW HARD IS IT FOR YOU TO PAY FOR THE VERY BASICS LIKE FOOD, HOUSING, MEDICAL CARE, AND HEATING?: NOT HARD AT ALL

## 2023-09-12 SDOH — ECONOMIC STABILITY: FOOD INSECURITY: WITHIN THE PAST 12 MONTHS, YOU WORRIED THAT YOUR FOOD WOULD RUN OUT BEFORE YOU GOT MONEY TO BUY MORE.: NEVER TRUE

## 2023-09-12 ASSESSMENT — PATIENT HEALTH QUESTIONNAIRE - PHQ9
SUM OF ALL RESPONSES TO PHQ QUESTIONS 1-9: 0
SUM OF ALL RESPONSES TO PHQ9 QUESTIONS 1 & 2: 0
SUM OF ALL RESPONSES TO PHQ QUESTIONS 1-9: 0
2. FEELING DOWN, DEPRESSED OR HOPELESS: 0
SUM OF ALL RESPONSES TO PHQ QUESTIONS 1-9: 0
SUM OF ALL RESPONSES TO PHQ QUESTIONS 1-9: 0
1. LITTLE INTEREST OR PLEASURE IN DOING THINGS: 0

## 2023-09-12 NOTE — PROGRESS NOTES
Chief Complaint   Patient presents with    Medication Refill     Patient presents in office today for med refill of Xanax. No concerns. 1. \"Have you been to the ER, urgent care clinic since your last visit? Hospitalized since your last visit? \" No    2. \"Have you seen or consulted any other health care providers outside of the [address] [place] [address] since your last visit? \" No     3. For patients aged 43-73: Has the patient had a colonoscopy / FIT/ Cologuard? Yes - no Care Gap present      If the patient is female:    4. For patients aged 43-66: Has the patient had a mammogram within the past 2 years? No      5. For patients aged 21-65: Has the patient had a pap smear?  No
as seen in the above orders. The patient understands and agrees with the plan. The patient has received an after-visit summary and questions were answered concerning future plans. Medication Side Effects and Warnings were discussed with patient  Patient Labs were reviewed and or requested:  Patient Past Records were reviewed and or requested    Blanca Dejesus M.D. There are no Patient Instructions on file for this visit.

## 2023-09-20 RX ORDER — PANTOPRAZOLE SODIUM 40 MG/1
40 TABLET, DELAYED RELEASE ORAL DAILY
Qty: 90 TABLET | Refills: 3 | Status: SHIPPED | OUTPATIENT
Start: 2023-09-20

## 2023-10-27 DIAGNOSIS — I10 ESSENTIAL (PRIMARY) HYPERTENSION: Primary | ICD-10-CM

## 2023-10-27 NOTE — TELEPHONE ENCOUNTER
Refill per VO of Dr. Leonel Mcgarry  Last appt: 5/2/2023    Future Appointments   Date Time Provider [address]   2/20/2024  9:00 AM MD DIANA Soliz AMB       Requested Prescriptions     Pending Prescriptions Disp Refills    amLODIPine (NORVASC) 10 MG tablet 90 tablet 3     Sig: Take 0.5 tablets by mouth daily

## 2023-10-30 RX ORDER — AMLODIPINE BESYLATE 10 MG/1
10 TABLET ORAL DAILY
Qty: 90 TABLET | Refills: 3 | Status: SHIPPED | OUTPATIENT
Start: 2023-10-30

## 2024-02-28 ENCOUNTER — OFFICE VISIT (OUTPATIENT)
Age: 64
End: 2024-02-28
Payer: COMMERCIAL

## 2024-02-28 VITALS
BODY MASS INDEX: 24.66 KG/M2 | SYSTOLIC BLOOD PRESSURE: 130 MMHG | HEART RATE: 51 BPM | HEIGHT: 62 IN | OXYGEN SATURATION: 98 % | WEIGHT: 134 LBS | DIASTOLIC BLOOD PRESSURE: 64 MMHG

## 2024-02-28 DIAGNOSIS — I10 PRIMARY HYPERTENSION: Primary | ICD-10-CM

## 2024-02-28 DIAGNOSIS — F17.200 SMOKER: ICD-10-CM

## 2024-02-28 DIAGNOSIS — M79.605 PAIN OF LEFT LOWER EXTREMITY: ICD-10-CM

## 2024-02-28 DIAGNOSIS — F41.9 ANXIETY DISORDER, UNSPECIFIED TYPE: ICD-10-CM

## 2024-02-28 PROCEDURE — 3075F SYST BP GE 130 - 139MM HG: CPT | Performed by: SPECIALIST

## 2024-02-28 PROCEDURE — 3078F DIAST BP <80 MM HG: CPT | Performed by: SPECIALIST

## 2024-02-28 PROCEDURE — 99214 OFFICE O/P EST MOD 30 MIN: CPT | Performed by: SPECIALIST

## 2024-02-28 RX ORDER — ROSUVASTATIN CALCIUM 10 MG/1
10 TABLET, COATED ORAL DAILY
Qty: 90 TABLET | Refills: 3 | Status: SHIPPED | OUTPATIENT
Start: 2024-02-28

## 2024-02-28 NOTE — PROGRESS NOTES
Chief Complaint   Patient presents with    Follow-up     6 months    Hypertension    Hyperlipidemia     Vitals:    02/28/24 0846   BP: 130/64   Site: Left Upper Arm   Position: Sitting   Cuff Size: Medium Adult   Pulse: 51   SpO2: 98%   Weight: 60.8 kg (134 lb)   Height: 1.575 m (5' 2\")       Chest pain NO     ER, urgent care, or hospitalized outside of [facility] since your last visit?  NO     Refills rosuvastatin; out for a month.  
Orders for CT heart scan info    LE PVR's for leg pain when possible    See Janine in 6 months  per Dr. Goodrich's VO.  
  Skin: Negative for rash  Heme: No problems bleeding.  Neurological: Negative for speech change and focal weakness.     PHYSICAL EXAM:     Physical Exam:  /64 (Site: Left Upper Arm, Position: Sitting, Cuff Size: Medium Adult)   Pulse 51   Ht 1.575 m (5' 2\")   Wt 60.8 kg (134 lb)   SpO2 98%   BMI 24.51 kg/m²     Patient appears generally well, mood and affect are appropriate and pleasant.  HEENT:  Hearing intact, non-icteric, normocephalic, atraumatic.   Neck Exam: Supple, No JVD   Lung Exam: Clear to auscultation, even breath sounds.   Cardiac Exam: Regular rate and rhythm with no murmur or rub  Abdomen: Soft, non-tender  Extremities: Moves all ext well. No lower extremity edema.  MSKTL: Overall good ROM ext  Skin: No significant rashes  Psych: Appropriate affect  Neuro - Grossly intact    LABS / OTHER STUDIES:     Lab Results   Component Value Date     08/21/2023    K 4.1 08/21/2023     (H) 08/21/2023    CO2 24 08/21/2023    BUN 13 08/21/2023    CREATININE 0.71 08/21/2023    GLUCOSE 75 08/21/2023    CALCIUM 9.3 08/21/2023    PROT 6.2 (L) 05/19/2023    LABALBU 3.6 05/19/2023    BILITOT 0.2 05/19/2023    ALKPHOS 98 05/19/2023    AST 10 (L) 05/19/2023    ALT 9 (L) 05/19/2023    LABGLOM >60 08/21/2023    GFRAA >60 04/12/2022    AGRATIO 1.4 05/19/2023    GLOB 2.6 05/19/2023       Lab Results   Component Value Date    WBC 4.2 05/19/2023    HGB 12.3 05/19/2023    HCT 39.7 05/19/2023    .0 (H) 05/19/2023     05/19/2023     Lab Results   Component Value Date    CHOL 173 05/19/2023    TRIG 38 05/19/2023     05/19/2023    LDLCALC 65.4 05/19/2023    VLDL 8 02/07/2022    CHOLHDLRATIO 1.7 05/19/2023         CARDIAC DIAGNOSTICS:     Cardiac Evaluation Includes:  I reviewed the test results below.     EKG 2/7/22 - NSR, possible LAE  EKG 2/22/22 - NSR, possible LAE  EKG 6/23 - NSR, normal         Echo 5/12 - LVEF 55-60%  CXR 2/21/22 - clear      Echo 3/21/22 - LVEF 61%, LAE, AV

## 2024-03-09 ENCOUNTER — HOSPITAL ENCOUNTER (EMERGENCY)
Facility: HOSPITAL | Age: 64
Discharge: HOME | End: 2024-03-09
Attending: STUDENT IN AN ORGANIZED HEALTH CARE EDUCATION/TRAINING PROGRAM
Payer: COMMERCIAL

## 2024-03-09 ENCOUNTER — APPOINTMENT (OUTPATIENT)
Facility: HOSPITAL | Age: 64
End: 2024-03-09
Payer: COMMERCIAL

## 2024-03-09 VITALS
HEART RATE: 74 BPM | DIASTOLIC BLOOD PRESSURE: 80 MMHG | TEMPERATURE: 98.3 F | SYSTOLIC BLOOD PRESSURE: 160 MMHG | RESPIRATION RATE: 17 BRPM | OXYGEN SATURATION: 99 %

## 2024-03-09 DIAGNOSIS — S42.212A FX HUMERAL NECK, LEFT, CLOSED, INITIAL ENCOUNTER: Primary | ICD-10-CM
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
## 2024-03-09 DIAGNOSIS — M54.2 NECK PAIN: ICD-10-CM

## 2024-03-09 PROCEDURE — 73060 X-RAY EXAM OF HUMERUS: CPT

## 2024-03-09 PROCEDURE — 73080 X-RAY EXAM OF ELBOW: CPT

## 2024-03-09 PROCEDURE — 99284 EMERGENCY DEPT VISIT MOD MDM: CPT

## 2024-03-09 PROCEDURE — 73030 X-RAY EXAM OF SHOULDER: CPT

## 2024-03-09 PROCEDURE — 6370000000 HC RX 637 (ALT 250 FOR IP): Performed by: STUDENT IN AN ORGANIZED HEALTH CARE EDUCATION/TRAINING PROGRAM

## 2024-03-09 PROCEDURE — 72125 CT NECK SPINE W/O DYE: CPT

## 2024-03-09 RX ORDER — OXYCODONE HYDROCHLORIDE 5 MG/1
5 TABLET ORAL ONCE
Status: COMPLETED | OUTPATIENT
Start: 2024-03-09 | End: 2024-03-09

## 2024-03-09 RX ORDER — OXYCODONE HYDROCHLORIDE 5 MG/1
5 TABLET ORAL EVERY 6 HOURS PRN
Qty: 6 TABLET | Refills: 0 | Status: SHIPPED | OUTPATIENT
Start: 2024-03-09 | End: 2024-03-12

## 2024-03-09 RX ORDER — OXYCODONE HYDROCHLORIDE 5 MG/1
5 TABLET ORAL EVERY 6 HOURS PRN
Qty: 6 TABLET | Refills: 0 | Status: SHIPPED | OUTPATIENT
Start: 2024-03-09 | End: 2024-03-09

## 2024-03-09 RX ADMIN — OXYCODONE 5 MG: 5 TABLET ORAL at 22:42

## 2024-03-09 ASSESSMENT — PAIN DESCRIPTION - ORIENTATION
ORIENTATION: LEFT

## 2024-03-09 ASSESSMENT — PAIN SCALES - GENERAL
PAINLEVEL_OUTOF10: 10
PAINLEVEL_OUTOF10: 8
PAINLEVEL_OUTOF10: 10

## 2024-03-09 ASSESSMENT — PAIN DESCRIPTION - ONSET: ONSET: SUDDEN

## 2024-03-09 ASSESSMENT — PAIN DESCRIPTION - LOCATION
LOCATION: ARM
LOCATION: SHOULDER
LOCATION: ARM

## 2024-03-09 ASSESSMENT — LIFESTYLE VARIABLES
HOW OFTEN DO YOU HAVE A DRINK CONTAINING ALCOHOL: NEVER
HOW MANY STANDARD DRINKS CONTAINING ALCOHOL DO YOU HAVE ON A TYPICAL DAY: PATIENT DOES NOT DRINK

## 2024-03-09 ASSESSMENT — PAIN DESCRIPTION - PAIN TYPE: TYPE: ACUTE PAIN

## 2024-03-09 ASSESSMENT — PAIN DESCRIPTION - FREQUENCY: FREQUENCY: CONTINUOUS

## 2024-03-09 ASSESSMENT — PAIN - FUNCTIONAL ASSESSMENT
PAIN_FUNCTIONAL_ASSESSMENT: 0-10
PAIN_FUNCTIONAL_ASSESSMENT: PREVENTS OR INTERFERES SOME ACTIVE ACTIVITIES AND ADLS

## 2024-03-09 ASSESSMENT — PAIN DESCRIPTION - DESCRIPTORS
DESCRIPTORS: SPASM;STABBING;SHARP
DESCRIPTORS: DISCOMFORT

## 2024-03-10 NOTE — ED TRIAGE NOTES
Pt arrives from home w/ CC left shoulder pain falling on slippery floor during dinner earlier tonight. Denies LOC, hitting head. A&Ox4. VSS

## 2024-03-10 NOTE — ED NOTES
Bedside and Verbal shift change report given to Michelle (oncoming nurse) by Ele (offgoing nurse). Report included the following information Nurse Handoff Report and ED SBAR.

## 2024-03-10 NOTE — ED PROVIDER NOTES
[facility] EMERGENCY DEP  EMERGENCY DEPARTMENT ENCOUNTER      Pt Name: Christie Mason  MRN: [id number]  Birthdate 1960  Date of evaluation: 3/9/2024  Provider: Rm Diaz MD    CHIEF COMPLAINT       Chief Complaint   Patient presents with    Shoulder Injury     left    Fall       HISTORY OF PRESENT ILLNESS    HPI    63-year-old female history of hypertension, hyperlipidemia, anxiety, CHF presenting for mechanical fall.  Patient was in a restaurant, slipped on a wet floor and fell to the left side.  Did not hit her head or pass out.  Reports landing on her left shoulder and having sniffing and pain and difficulty moving her left arm ever since.  Denies low back pain, hip pain, numbness, tingling or weakness.  Denies vision changes, nausea or vomiting.  Denies loss of consciousness, has been able to ambulate afterward but with some pain.    Nursing notes reviewed.    REVIEW OF SYSTEMS     Review of Systems  Unless otherwise stated, a complete review of systems was asked of the patient. Pertinent positives are noted in the HPI section.    PAST MEDICAL HISTORY     Past Medical History:   Diagnosis Date    Anxiety     Chronic pain     Dyslipidemia     H/O spinal fusion     Hypertension     Microcytic anemia 3/25/2021    Non-nicotine vapor product user     Obesity     s/p weight loss surgery     Psychiatric disorder     anxiety    Smoker        SURGICAL HISTORY       Past Surgical History:   Procedure Laterality Date    BREAST BIOPSY Left     years ago    BREAST BIOPSY Right 06/2018    Benign    CERVICAL FUSION      CHOLECYSTECTOMY      GASTRIC BYPASS SURGERY      HYSTERECTOMY (CERVIX STATUS UNKNOWN)      LAP BAND ADJUST PROCEDURE      [facility] STEROTACTIC LOC BREAST BIOPSY RIGHT Right 6/20/2018    CALDERON STEROTACTIC LOC BREAST BIOPSY RIGHT 6/20/2018 [facility] RAD MAMMO    ORTHOPEDIC SURGERY      foot surgery--bunions--bilateral    ORTHOPEDIC SURGERY      ACDF    REMOVAL GALLBLADDER         CURRENT MEDICATIONS

## 2024-03-10 NOTE — ED NOTES
Patient given and fitted for a left shoulder immobilizer (sling). Children and patient educated on the use and fitting of the sling.

## 2024-03-10 NOTE — DISCHARGE INSTRUCTIONS
You have a fracture of your left humerus near your shoulder.  Wear the provided sling and follow-up with the orthopedic specialist as soon as possible for reevaluation and further care.  Take the prescribed pain medication as directed.  Do not take narcotic like oxycodone with your Xanax as this can cause severe drowsiness.    On your imaging you were incidentally found to have a thyroid nodule.  You are also found to have a small spot or \"bone lesion.\"  Both of these findings need to be followed up by your primary doctor for further imaging as needed.    Return to the emergency department if you have numbness, tingling, weakness or any other changing or worsening symptoms

## 2024-03-10 NOTE — ED NOTES
Patient stated verbal understanding of dc paperwork and was wheeled to front of ed entrance without incident.   Patient's family to drive her home.

## 2024-03-15 ENCOUNTER — OFFICE VISIT (OUTPATIENT)
Age: 64
End: 2024-03-15
Payer: COMMERCIAL

## 2024-03-15 ENCOUNTER — PATIENT MESSAGE (OUTPATIENT)
Age: 64
End: 2024-03-15

## 2024-03-15 VITALS
WEIGHT: 144 LBS | HEIGHT: 62 IN | TEMPERATURE: 98.5 F | BODY MASS INDEX: 26.5 KG/M2 | DIASTOLIC BLOOD PRESSURE: 85 MMHG | HEART RATE: 61 BPM | SYSTOLIC BLOOD PRESSURE: 144 MMHG

## 2024-03-15 DIAGNOSIS — I10 PRIMARY HYPERTENSION: ICD-10-CM

## 2024-03-15 DIAGNOSIS — Z78.9 DEFICIT IN ACTIVITIES OF DAILY LIVING (ADL): ICD-10-CM

## 2024-03-15 DIAGNOSIS — R93.7 ABNORMAL CT SCAN, CERVICAL SPINE: ICD-10-CM

## 2024-03-15 DIAGNOSIS — E04.1 THYROID NODULE GREATER THAN OR EQUAL TO 1.5 CM IN DIAMETER INCIDENTALLY NOTED ON IMAGING STUDY: Primary | ICD-10-CM

## 2024-03-15 DIAGNOSIS — M79.672 LEFT FOOT PAIN: ICD-10-CM
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
## 2024-03-15 DIAGNOSIS — S70.11XA CONTUSION OF RIGHT THIGH, INITIAL ENCOUNTER: ICD-10-CM

## 2024-03-15 DIAGNOSIS — S42.295A OTHER CLOSED NONDISPLACED FRACTURE OF PROXIMAL END OF LEFT HUMERUS, INITIAL ENCOUNTER: ICD-10-CM

## 2024-03-15 DIAGNOSIS — S42.295A OTHER CLOSED NONDISPLACED FRACTURE OF PROXIMAL END OF LEFT HUMERUS, INITIAL ENCOUNTER: Primary | ICD-10-CM

## 2024-03-15 PROCEDURE — 99214 OFFICE O/P EST MOD 30 MIN: CPT | Performed by: NURSE PRACTITIONER

## 2024-03-15 PROCEDURE — 3079F DIAST BP 80-89 MM HG: CPT | Performed by: NURSE PRACTITIONER

## 2024-03-15 PROCEDURE — 3077F SYST BP >= 140 MM HG: CPT | Performed by: NURSE PRACTITIONER
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
## 2024-03-18 NOTE — TELEPHONE ENCOUNTER
From: Oskar Agrawal  To: Christie Mason  Sent: 3/15/2024 3:11 PM EDT  Subject: follow up testing    I tried to reach you by phone to give you an update but was not able to speak with you.  I discussed the CT of your neck with Dr. Chandler, she suggested that I reach out to the radiologist to see what follow up testing would be recommended.   I attempted to reach the radiologist but he is out of the office today and will return Monday.  I will reach out to him on Monday and give you an update once I have spoken with him.   Oskar

## 2024-03-19 ENCOUNTER — PATIENT MESSAGE (OUTPATIENT)
Age: 64
End: 2024-03-19

## 2024-03-19 ENCOUNTER — TELEPHONE (OUTPATIENT)
Age: 64
End: 2024-03-19

## 2024-03-19 NOTE — TELEPHONE ENCOUNTER
Office note and referral faxed to [facility] . Fax number [phone]. Confirmation number [id number].

## 2024-03-19 NOTE — TELEPHONE ENCOUNTER
Sent [facility] [employer] message to patient to advise of the notification, requested she consult with her insurance company to find out what agency she can use and let me know so referral can be faxed to them. Awaiting response.

## 2024-03-19 NOTE — TELEPHONE ENCOUNTER
[facility] called to inform you that they are not in network with the pt's ins

## 2024-03-22 NOTE — TELEPHONE ENCOUNTER
Information sent as requested.   
Agency  [email or web address]  Location  10.04 miles  [address] [place] [address] [place], [place]  Telephone  [phone]  [facility]  In-Network  Specialty  Home Health     This is a list of few

## 2024-04-12 ENCOUNTER — HOSPITAL ENCOUNTER (OUTPATIENT)
Facility: HOSPITAL | Age: 64
Discharge: HOME | End: 2024-04-12
Payer: COMMERCIAL

## 2024-04-12 DIAGNOSIS — E04.1 THYROID NODULE GREATER THAN OR EQUAL TO 1.5 CM IN DIAMETER INCIDENTALLY NOTED ON IMAGING STUDY: ICD-10-CM

## 2024-04-12 PROCEDURE — 76536 US EXAM OF HEAD AND NECK: CPT

## 2024-04-15 DIAGNOSIS — E04.1 RIGHT THYROID NODULE: Primary | ICD-10-CM

## 2024-04-16 ENCOUNTER — TRANSCRIBE ORDERS (OUTPATIENT)
Facility: HOSPITAL | Age: 64
End: 2024-04-16

## 2024-04-16 DIAGNOSIS — E04.1 RIGHT THYROID NODULE: Primary | ICD-10-CM

## 2024-04-20 DIAGNOSIS — F41.9 ANXIETY: ICD-10-CM

## 2024-04-23 DIAGNOSIS — F41.9 ANXIETY: ICD-10-CM

## 2024-04-23 RX ORDER — ALPRAZOLAM 0.5 MG/1
TABLET ORAL
Qty: 45 TABLET | Refills: 5 | Status: SHIPPED | OUTPATIENT
Start: 2024-04-23 | End: 2025-04-09

## 2024-04-23 RX ORDER — ALPRAZOLAM 0.5 MG/1
TABLET ORAL
Qty: 45 TABLET | OUTPATIENT
Start: 2024-04-23

## 2024-04-30 ENCOUNTER — HOSPITAL ENCOUNTER (OUTPATIENT)
Facility: HOSPITAL | Age: 64
Discharge: HOME | End: 2024-05-03
Payer: COMMERCIAL

## 2024-04-30 DIAGNOSIS — E04.1 RIGHT THYROID NODULE: ICD-10-CM

## 2024-04-30 PROCEDURE — 2500000003 HC RX 250 WO HCPCS

## 2024-04-30 PROCEDURE — 88173 CYTOPATH EVAL FNA REPORT: CPT

## 2024-04-30 PROCEDURE — 10005 FNA BX W/US GDN 1ST LES: CPT

## 2024-04-30 PROCEDURE — 88172 CYTP DX EVAL FNA 1ST EA SITE: CPT

## 2024-04-30 RX ORDER — LIDOCAINE HYDROCHLORIDE 10 MG/ML
10 INJECTION, SOLUTION EPIDURAL; INFILTRATION; INTRACAUDAL; PERINEURAL ONCE
Status: COMPLETED | OUTPATIENT
Start: 2024-04-30 | End: 2024-04-30

## 2024-04-30 RX ADMIN — LIDOCAINE HYDROCHLORIDE 10 ML: 10 INJECTION, SOLUTION EPIDURAL; INFILTRATION; INTRACAUDAL; PERINEURAL at 13:15

## 2024-05-03 ENCOUNTER — TELEPHONE (OUTPATIENT)
Age: 64
End: 2024-05-03

## 2024-05-06 ENCOUNTER — TELEPHONE (OUTPATIENT)
Age: 64
End: 2024-05-06

## 2024-05-06 NOTE — TELEPHONE ENCOUNTER
Patient would like to discuss her lab results from Oskar Agrawal. Please call patient @ [phone].

## 2024-05-07 NOTE — TELEPHONE ENCOUNTER
Patient showed up in the office and Dr. Chandler addressed her concerns about the results. The result was Benign and the patient is ok to be seen at a later date.  Patient was given the results in hand.

## 2024-07-02 ENCOUNTER — OFFICE VISIT (OUTPATIENT)
Age: 64
End: 2024-07-02
Payer: COMMERCIAL

## 2024-07-02 VITALS
TEMPERATURE: 97.8 F | BODY MASS INDEX: 25.21 KG/M2 | WEIGHT: 137 LBS | HEIGHT: 62 IN | HEART RATE: 64 BPM | RESPIRATION RATE: 18 BRPM | OXYGEN SATURATION: 95 % | DIASTOLIC BLOOD PRESSURE: 76 MMHG | SYSTOLIC BLOOD PRESSURE: 193 MMHG

## 2024-07-02 DIAGNOSIS — E04.1 THYROID NODULE GREATER THAN OR EQUAL TO 1.5 CM IN DIAMETER INCIDENTALLY NOTED ON IMAGING STUDY: ICD-10-CM

## 2024-07-02 DIAGNOSIS — Z12.31 SCREENING MAMMOGRAM FOR BREAST CANCER: ICD-10-CM

## 2024-07-02 DIAGNOSIS — I87.8: Primary | ICD-10-CM

## 2024-07-02 PROCEDURE — 99214 OFFICE O/P EST MOD 30 MIN: CPT | Performed by: NURSE PRACTITIONER

## 2024-07-02 PROCEDURE — 3078F DIAST BP <80 MM HG: CPT | Performed by: NURSE PRACTITIONER

## 2024-07-02 PROCEDURE — 3077F SYST BP >= 140 MM HG: CPT | Performed by: NURSE PRACTITIONER

## 2024-07-02 RX ORDER — OXYCODONE HYDROCHLORIDE 5 MG/1
TABLET ORAL
COMMUNITY

## 2024-07-02 RX ORDER — LATANOPROST 50 UG/ML
SOLUTION/ DROPS OPHTHALMIC
COMMUNITY

## 2024-07-02 ASSESSMENT — PATIENT HEALTH QUESTIONNAIRE - PHQ9
SUM OF ALL RESPONSES TO PHQ QUESTIONS 1-9: 0
2. FEELING DOWN, DEPRESSED OR HOPELESS: NOT AT ALL
1. LITTLE INTEREST OR PLEASURE IN DOING THINGS: NOT AT ALL
SUM OF ALL RESPONSES TO PHQ QUESTIONS 1-9: 0
SUM OF ALL RESPONSES TO PHQ9 QUESTIONS 1 & 2: 0
SUM OF ALL RESPONSES TO PHQ QUESTIONS 1-9: 0
SUM OF ALL RESPONSES TO PHQ QUESTIONS 1-9: 0

## 2024-07-02 NOTE — PROGRESS NOTES
Chief Complaint   Patient presents with    Other     Biopsy results     Patient is in the office today to go over biopsy results.  Patient stated she would like to discuss her biopsy that she had with Tanja XAVIER.  Patient stated that she had a fall that caused her to break her humerus.   Patient would like a referral for a mammogram.     \"Have you been to the ER, urgent care clinic since your last visit?  Hospitalized since your last visit?\"    NO    “Have you seen or consulted any other health care providers outside of [facility] since your last visit?”    NO    Have you had a mammogram?”   NO    Date of last Mammogram: 3/24/2022             Click Here for Release of Records Request

## 2024-07-03 NOTE — PROGRESS NOTES
Christie Mason (:  1960) is a 63 y.o. female,Established patient, here for evaluation of the following chief complaint(s):  Other (Biopsy results)      Assessment & Plan   1. Phlebolithiasis of pelvic region  -     [facility] - Patric Patel MD, Vascular Surgery, [place]  2. Thyroid nodule greater than or equal to 1.5 cm in diameter incidentally noted on imaging study  -     [facility] - Elsy Fitch MD, Otolaryngology ([facility]), [place]  3. Screening mammogram for breast cancer  -     [facility] DIGITAL SCREEN W OR WO CAD BILATERAL; Future    Referral to vascular surgeon for eval for the pelvic fluid and vascular changes seen on CT  Referral to ENT for biopsy follow up on thyroid nodule  Mammogram order given    No follow-ups on file.       Subjective   HPI  Patient is in the office today to go over biopsy results.  Patient stated she would like to discuss her biopsy that she had with Tanja XAVIER.  She had a thyroid nodule with biopsy, never was referred to ENT for eval post biopsy, benign looking report    Patient stated that she had a fall that caused her to break her humerus.   During the work up of the injury, noted to have fluid in the pelvis and changes with the venous system, denies any peripheral edema or pelvic pain.    Patient would like a referral for a mammogram.     Review of Systems   A comprehensive review of system was obtained and negative except findings in the HPI      Objective   Physical Exam  Vitals and nursing note reviewed.   Constitutional:       General: She is not in acute distress.     Appearance: Normal appearance.   Cardiovascular:      Rate and Rhythm: Normal rate and regular rhythm.   Pulmonary:      Effort: Pulmonary effort is normal. No respiratory distress.      Breath sounds: Normal breath sounds. No wheezing or rhonchi.   Musculoskeletal:         General: No swelling.   Neurological:      General: No focal deficit present.      Mental Status: She is alert and oriented

## 2024-07-09 ENCOUNTER — HOSPITAL ENCOUNTER (OUTPATIENT)
Facility: HOSPITAL | Age: 64
Discharge: HOME | End: 2024-07-12
Payer: COMMERCIAL

## 2024-07-09 VITALS — HEIGHT: 62 IN | WEIGHT: 134 LBS | BODY MASS INDEX: 24.66 KG/M2

## 2024-07-09 DIAGNOSIS — Z12.31 SCREENING MAMMOGRAM FOR BREAST CANCER: ICD-10-CM

## 2024-07-09 PROCEDURE — 77063 BREAST TOMOSYNTHESIS BI: CPT

## 2024-07-29 ENCOUNTER — TELEPHONE (OUTPATIENT)
Age: 64
End: 2024-07-29

## 2024-07-29 DIAGNOSIS — E04.1 THYROID NODULE GREATER THAN OR EQUAL TO 1.5 CM IN DIAMETER INCIDENTALLY NOTED ON IMAGING STUDY: Primary | ICD-10-CM

## 2024-07-29 DIAGNOSIS — S42.295A OTHER CLOSED NONDISPLACED FRACTURE OF PROXIMAL END OF LEFT HUMERUS, INITIAL ENCOUNTER: Primary | ICD-10-CM

## 2024-07-29 DIAGNOSIS — I87.8: Primary | ICD-10-CM

## 2024-07-29 NOTE — TELEPHONE ENCOUNTER
----- Message from Rian Greenfield Alem sent at 7/29/2024  1:54 PM EDT -----  Regarding: ECC Referral Request  ECC Referral Request    Reason for referral request: Specialty Provider    Specialist/Lab/Test patient is requesting (if known):neri hernandez    Specialist Phone Number (if applicable): [phone]    Additional Information Physical Medicine and Rehabilitation  --------------------------------------------------------------------------------------------------------------------------    Relationship to Patient: Self     Call Back Information: OK to leave message on voicemail  Preferred Call Back Number: Phone  [phone]

## 2024-07-29 NOTE — TELEPHONE ENCOUNTER
----- Message from Rian Ferrara sent at 7/29/2024  2:00 PM EDT -----  Regarding: ECC Referral Request  ECC Referral Request    Reason for referral request: Specialty Provider    Specialist/Lab/Test patient is requesting (if known): harsh Liz Phone Number (if applicable): [phone]    Additional Information Orthopedic Surgery  --------------------------------------------------------------------------------------------------------------------------    Relationship to Patient: Self     Call Back Information: OK to leave message on voicemail  Preferred Call Back Number: Phone  [phone]

## 2024-07-29 NOTE — TELEPHONE ENCOUNTER
----- Message from Rian Greenfield Alem sent at 7/29/2024  1:51 PM EDT -----  Regarding: ECC Referral Request  ECC Referral Request    Reason for referral request: Specialty Provider    Specialist/Lab/Test patient is requesting (if known):solo jhaveri    Specialist Phone Number (if applicable): [phone]    Additional   --------------------------------------------------------------------------------------------------------------------------    Relationship to Patient: Self     Call Back Information: OK to leave message on voicemail  Preferred Call Back Number: Phone [phone]

## 2024-07-29 NOTE — TELEPHONE ENCOUNTER
----- Message from Rian Jessica Gin Ferrara sent at 7/29/2024  1:45 PM EDT -----  Regarding: ECC Referral Request  ECC Referral Request    Reason for referral request: Specialty Provider    Specialist/Lab/Test patient is requesting (if known): Elsy Fitch MD    Specialist Phone Number (if applicable): [phone]    Additional Information requesting Otolaryngology  --------------------------------------------------------------------------------------------------------------------------    Relationship to Patient: Self     Call Back Information: OK to leave message on voicemail  Preferred Call Back Number: Phone [phone]

## 2024-08-03 DIAGNOSIS — I10 ESSENTIAL (PRIMARY) HYPERTENSION: ICD-10-CM

## 2024-08-05 ENCOUNTER — TELEPHONE (OUTPATIENT)
Age: 64
End: 2024-08-05

## 2024-08-05 ENCOUNTER — CLINICAL DOCUMENTATION (OUTPATIENT)
Age: 64
End: 2024-08-05

## 2024-08-05 ENCOUNTER — OFFICE VISIT (OUTPATIENT)
Age: 64
End: 2024-08-05
Payer: MEDICARE

## 2024-08-05 VITALS
OXYGEN SATURATION: 98 % | WEIGHT: 129 LBS | HEIGHT: 62 IN | RESPIRATION RATE: 16 BRPM | HEART RATE: 125 BPM | SYSTOLIC BLOOD PRESSURE: 136 MMHG | BODY MASS INDEX: 23.74 KG/M2 | DIASTOLIC BLOOD PRESSURE: 74 MMHG

## 2024-08-05 DIAGNOSIS — E04.1 THYROID NODULE: Primary | ICD-10-CM

## 2024-08-05 PROCEDURE — 3078F DIAST BP <80 MM HG: CPT | Performed by: OTOLARYNGOLOGY

## 2024-08-05 PROCEDURE — 99203 OFFICE O/P NEW LOW 30 MIN: CPT | Performed by: OTOLARYNGOLOGY

## 2024-08-05 PROCEDURE — G8427 DOCREV CUR MEDS BY ELIG CLIN: HCPCS | Performed by: OTOLARYNGOLOGY

## 2024-08-05 PROCEDURE — G8420 CALC BMI NORM PARAMETERS: HCPCS | Performed by: OTOLARYNGOLOGY

## 2024-08-05 PROCEDURE — 3075F SYST BP GE 130 - 139MM HG: CPT | Performed by: OTOLARYNGOLOGY

## 2024-08-05 PROCEDURE — 3017F COLORECTAL CA SCREEN DOC REV: CPT | Performed by: OTOLARYNGOLOGY

## 2024-08-05 PROCEDURE — 4004F PT TOBACCO SCREEN RCVD TLK: CPT | Performed by: OTOLARYNGOLOGY

## 2024-08-05 RX ORDER — CHLORTHALIDONE 25 MG/1
12.5 TABLET ORAL DAILY
Qty: 30 TABLET | Refills: 1 | OUTPATIENT
Start: 2024-08-05

## 2024-08-05 NOTE — TELEPHONE ENCOUNTER
Refill per VO of Dr. Goodrich  Last appt: 9/7/2023    Future Appointments   Date Time Provider Department Center   4/8/2025  9:00 AM Elsy Fitch MD REP [facility]       Requested Prescriptions     Refused Prescriptions Disp Refills    chlorthalidone (HYGROTON) 25 MG tablet [Pharmacy Med Name: CHLORTHALIDONE 25MG TABLETS] 30 tablet 1     Sig: TAKE 1/2 TABLET BY MOUTH DAILY     Refused By: JAYLON FONSECA     Reason for Refusal: Medication discontinued
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
## 2024-08-19 ENCOUNTER — CLINICAL DOCUMENTATION (OUTPATIENT)
Age: 64
End: 2024-08-19

## 2024-09-13 ENCOUNTER — OFFICE VISIT (OUTPATIENT)
Age: 64
End: 2024-09-13
Payer: MEDICARE

## 2024-09-13 VITALS
TEMPERATURE: 97.6 F | RESPIRATION RATE: 20 BRPM | SYSTOLIC BLOOD PRESSURE: 196 MMHG | HEART RATE: 66 BPM | BODY MASS INDEX: 25.21 KG/M2 | HEIGHT: 62 IN | DIASTOLIC BLOOD PRESSURE: 92 MMHG | WEIGHT: 137 LBS | OXYGEN SATURATION: 99 %

## 2024-09-13 DIAGNOSIS — M79.671 BILATERAL FOOT PAIN: ICD-10-CM

## 2024-09-13 DIAGNOSIS — R60.0 PERIPHERAL EDEMA: Primary | ICD-10-CM

## 2024-09-13 DIAGNOSIS — Z91.81 AT HIGH RISK FOR FALLS: ICD-10-CM

## 2024-09-13 DIAGNOSIS — R06.2 WHEEZE: ICD-10-CM

## 2024-09-13 DIAGNOSIS — I10 PRIMARY HYPERTENSION: ICD-10-CM

## 2024-09-13 DIAGNOSIS — M79.672 BILATERAL FOOT PAIN: ICD-10-CM

## 2024-09-13 DIAGNOSIS — R05.1 ACUTE COUGH: ICD-10-CM

## 2024-09-13 PROCEDURE — 1123F ACP DISCUSS/DSCN MKR DOCD: CPT | Performed by: PHYSICIAN ASSISTANT

## 2024-09-13 PROCEDURE — 3077F SYST BP >= 140 MM HG: CPT | Performed by: PHYSICIAN ASSISTANT

## 2024-09-13 PROCEDURE — 3080F DIAST BP >= 90 MM HG: CPT | Performed by: PHYSICIAN ASSISTANT

## 2024-09-13 PROCEDURE — 99214 OFFICE O/P EST MOD 30 MIN: CPT | Performed by: PHYSICIAN ASSISTANT

## 2024-09-13 RX ORDER — GUAIFENESIN 600 MG/1
600 TABLET, EXTENDED RELEASE ORAL 2 TIMES DAILY
Qty: 30 TABLET | Refills: 0 | Status: SHIPPED | OUTPATIENT
Start: 2024-09-13 | End: 2024-09-28

## 2024-09-13 RX ORDER — ALBUTEROL SULFATE 90 UG/1
2 AEROSOL, METERED RESPIRATORY (INHALATION) EVERY 4 HOURS PRN
Qty: 18 G | Refills: 1 | Status: SHIPPED | OUTPATIENT
Start: 2024-09-13 | End: 2026-07-06

## 2024-09-13 SDOH — ECONOMIC STABILITY: FOOD INSECURITY: WITHIN THE PAST 12 MONTHS, YOU WORRIED THAT YOUR FOOD WOULD RUN OUT BEFORE YOU GOT MONEY TO BUY MORE.: NEVER TRUE

## 2024-09-13 SDOH — ECONOMIC STABILITY: FOOD INSECURITY: WITHIN THE PAST 12 MONTHS, THE FOOD YOU BOUGHT JUST DIDN'T LAST AND YOU DIDN'T HAVE MONEY TO GET MORE.: NEVER TRUE

## 2024-09-13 SDOH — ECONOMIC STABILITY: INCOME INSECURITY: HOW HARD IS IT FOR YOU TO PAY FOR THE VERY BASICS LIKE FOOD, HOUSING, MEDICAL CARE, AND HEATING?: NOT HARD AT ALL

## 2024-09-13 ASSESSMENT — PATIENT HEALTH QUESTIONNAIRE - PHQ9
SUM OF ALL RESPONSES TO PHQ QUESTIONS 1-9: 0
2. FEELING DOWN, DEPRESSED OR HOPELESS: NOT AT ALL
SUM OF ALL RESPONSES TO PHQ QUESTIONS 1-9: 0
1. LITTLE INTEREST OR PLEASURE IN DOING THINGS: NOT AT ALL
SUM OF ALL RESPONSES TO PHQ9 QUESTIONS 1 & 2: 0
SUM OF ALL RESPONSES TO PHQ QUESTIONS 1-9: 0
SUM OF ALL RESPONSES TO PHQ QUESTIONS 1-9: 0

## 2024-09-14 LAB
ALBUMIN SERPL-MCNC: 4.4 G/DL (ref 3.9–4.9)
ALP SERPL-CCNC: 113 IU/L (ref 44–121)
ALT SERPL-CCNC: 6 IU/L (ref 0–32)
AST SERPL-CCNC: 13 IU/L (ref 0–40)
BILIRUB SERPL-MCNC: <0.2 MG/DL (ref 0–1.2)
BUN SERPL-MCNC: 17 MG/DL (ref 8–27)
BUN/CREAT SERPL: 22 (ref 12–28)
CALCIUM SERPL-MCNC: 9.8 MG/DL (ref 8.7–10.3)
CHLORIDE SERPL-SCNC: 105 MMOL/L (ref 96–106)
CO2 SERPL-SCNC: 24 MMOL/L (ref 20–29)
CREAT SERPL-MCNC: 0.76 MG/DL (ref 0.57–1)
EGFRCR SERPLBLD CKD-EPI 2021: 87 ML/MIN/1.73
GLOBULIN SER CALC-MCNC: 2.2 G/DL (ref 1.5–4.5)
GLUCOSE SERPL-MCNC: 75 MG/DL (ref 70–99)
POTASSIUM SERPL-SCNC: 4.9 MMOL/L (ref 3.5–5.2)
PROT SERPL-MCNC: 6.6 G/DL (ref 6–8.5)
SODIUM SERPL-SCNC: 141 MMOL/L (ref 134–144)

## 2024-11-06 RX ORDER — PANTOPRAZOLE SODIUM 40 MG/1
40 TABLET, DELAYED RELEASE ORAL DAILY
Qty: 90 TABLET | Refills: 3 | Status: SHIPPED | OUTPATIENT
Start: 2024-11-06

## 2024-11-21 DIAGNOSIS — F41.9 ANXIETY: ICD-10-CM

## 2024-11-21 RX ORDER — ALPRAZOLAM 0.5 MG
TABLET ORAL
Qty: 45 TABLET | Refills: 0 | Status: SHIPPED | OUTPATIENT
Start: 2024-11-21 | End: 2024-12-19

## 2024-12-31 DIAGNOSIS — F41.9 ANXIETY: ICD-10-CM

## 2024-12-31 RX ORDER — ALPRAZOLAM 0.5 MG
TABLET ORAL
Qty: 45 TABLET | OUTPATIENT
Start: 2024-12-31

## 2024-12-31 RX ORDER — LISINOPRIL 20 MG/1
20 TABLET ORAL 2 TIMES DAILY
Qty: 180 TABLET | Refills: 3 | Status: CANCELLED | OUTPATIENT
Start: 2024-12-31

## 2025-01-02 ENCOUNTER — TELEPHONE (OUTPATIENT)
Age: 65
End: 2025-01-02

## 2025-01-02 ENCOUNTER — TELEPHONE (OUTPATIENT)
Facility: CLINIC | Age: 65
End: 2025-01-02

## 2025-01-02 DIAGNOSIS — F41.9 ANXIETY: Primary | ICD-10-CM

## 2025-01-02 RX ORDER — LISINOPRIL 20 MG/1
20 TABLET ORAL 2 TIMES DAILY
Qty: 180 TABLET | Refills: 3 | Status: SHIPPED | OUTPATIENT
Start: 2025-01-02

## 2025-01-02 RX ORDER — ALPRAZOLAM 0.5 MG
0.5 TABLET ORAL 2 TIMES DAILY PRN
Qty: 30 TABLET | Refills: 0 | Status: SHIPPED | OUTPATIENT
Start: 2025-01-02 | End: 2025-02-01

## 2025-01-02 NOTE — TELEPHONE ENCOUNTER
Please assist with authorizing refill on lisinopril patient completely is out and she just confirmed with pharmacy that hasn't been updated yet, please assist patient after updating pharmacy

## 2025-01-02 NOTE — TELEPHONE ENCOUNTER
Refill per VO of Dr. Goodrich  Last appt: 2/28/2024    Future Appointments   Date Time Provider Department Center   2/4/2025  3:30 PM Gabi Schmitz, APRN - NP [id number]   4/21/2025  9:00 AM Elsy Fitch MD REP [facility]       Requested Prescriptions     Signed Prescriptions Disp Refills    lisinopril (PRINIVIL;ZESTRIL) 20 MG tablet 180 tablet 3     Sig: Take 1 tablet by mouth 2 times daily     Authorizing Provider: NARCISA GOODRICH     Ordering User: GITA FAY

## 2025-01-02 NOTE — TELEPHONE ENCOUNTER
Christie Mason needs a refill of Alpraxolam 0.5mg.  They have 0 pills/supply left and are requesting a ? day supply with refills.  Pharmacy has been updated in the chart. Patient was advised or scheduled an appointment for the future and to request refills thru the [facility] or by requesting a refill from their pharmacy in the future.  Patient was also advised to check with their pharmacy for status of when refills are available.    Patient scheduled for next available appointment on 2/4/2025 with Gabi Schmitz NP. Patient would like temporary refill sent to [facility] on file.     # [phone]

## 2025-01-27 ENCOUNTER — TELEPHONE (OUTPATIENT)
Facility: CLINIC | Age: 65
End: 2025-01-27

## 2025-01-27 NOTE — TELEPHONE ENCOUNTER
Called pt to give her the info for Gabi's new office, lvm letting her know to call us back if she needed the info or has any questions.

## 2025-02-04 ENCOUNTER — OFFICE VISIT (OUTPATIENT)
Facility: CLINIC | Age: 65
End: 2025-02-04
Payer: MEDICARE

## 2025-02-04 VITALS
WEIGHT: 153 LBS | HEART RATE: 75 BPM | DIASTOLIC BLOOD PRESSURE: 82 MMHG | OXYGEN SATURATION: 98 % | TEMPERATURE: 97.4 F | BODY MASS INDEX: 28.16 KG/M2 | SYSTOLIC BLOOD PRESSURE: 150 MMHG | HEIGHT: 62 IN

## 2025-02-04 DIAGNOSIS — D50.9 IRON DEFICIENCY ANEMIA, UNSPECIFIED IRON DEFICIENCY ANEMIA TYPE: ICD-10-CM

## 2025-02-04 DIAGNOSIS — R63.5 WEIGHT GAIN: ICD-10-CM

## 2025-02-04 DIAGNOSIS — I10 PRIMARY HYPERTENSION: ICD-10-CM

## 2025-02-04 DIAGNOSIS — F41.9 ANXIETY: Primary | ICD-10-CM

## 2025-02-04 DIAGNOSIS — I10 ESSENTIAL (PRIMARY) HYPERTENSION: ICD-10-CM

## 2025-02-04 DIAGNOSIS — E78.00 HYPERCHOLESTEROLEMIA: ICD-10-CM

## 2025-02-04 PROCEDURE — 1123F ACP DISCUSS/DSCN MKR DOCD: CPT | Performed by: NURSE PRACTITIONER

## 2025-02-04 PROCEDURE — 99214 OFFICE O/P EST MOD 30 MIN: CPT | Performed by: NURSE PRACTITIONER

## 2025-02-04 PROCEDURE — 3079F DIAST BP 80-89 MM HG: CPT | Performed by: NURSE PRACTITIONER

## 2025-02-04 PROCEDURE — 3077F SYST BP >= 140 MM HG: CPT | Performed by: NURSE PRACTITIONER

## 2025-02-04 RX ORDER — AMLODIPINE BESYLATE 10 MG/1
10 TABLET ORAL DAILY
Qty: 90 TABLET | Refills: 3 | Status: SHIPPED | OUTPATIENT
Start: 2025-02-04

## 2025-02-04 RX ORDER — ALPRAZOLAM 0.5 MG
0.5 TABLET ORAL 2 TIMES DAILY PRN
Qty: 30 TABLET | Refills: 2 | Status: SHIPPED | OUTPATIENT
Start: 2025-02-04 | End: 2025-03-06

## 2025-02-04 SDOH — ECONOMIC STABILITY: FOOD INSECURITY: WITHIN THE PAST 12 MONTHS, YOU WORRIED THAT YOUR FOOD WOULD RUN OUT BEFORE YOU GOT MONEY TO BUY MORE.: NEVER TRUE

## 2025-02-04 SDOH — ECONOMIC STABILITY: FOOD INSECURITY: WITHIN THE PAST 12 MONTHS, THE FOOD YOU BOUGHT JUST DIDN'T LAST AND YOU DIDN'T HAVE MONEY TO GET MORE.: NEVER TRUE

## 2025-02-04 SDOH — ECONOMIC STABILITY: INCOME INSECURITY: IN THE LAST 12 MONTHS, WAS THERE A TIME WHEN YOU WERE NOT ABLE TO PAY THE MORTGAGE OR RENT ON TIME?: NO

## 2025-02-04 SDOH — ECONOMIC STABILITY: TRANSPORTATION INSECURITY
IN THE PAST 12 MONTHS, HAS THE LACK OF TRANSPORTATION KEPT YOU FROM MEDICAL APPOINTMENTS OR FROM GETTING MEDICATIONS?: NO

## 2025-02-04 ASSESSMENT — PATIENT HEALTH QUESTIONNAIRE - PHQ9
SUM OF ALL RESPONSES TO PHQ9 QUESTIONS 1 & 2: 0
SUM OF ALL RESPONSES TO PHQ QUESTIONS 1-9: 0
SUM OF ALL RESPONSES TO PHQ QUESTIONS 1-9: 0
1. LITTLE INTEREST OR PLEASURE IN DOING THINGS: NOT AT ALL
SUM OF ALL RESPONSES TO PHQ QUESTIONS 1-9: 0
2. FEELING DOWN, DEPRESSED OR HOPELESS: NOT AT ALL
SUM OF ALL RESPONSES TO PHQ QUESTIONS 1-9: 0

## 2025-02-04 NOTE — PROGRESS NOTES
Christie Mason (:  1960) is a 64 y.o. female, Established patient, here for evaluation of the following chief complaint(s):  Follow-up Chronic Condition (Medication refill of Alprazolam, wants to have Iron checked has been eating more ice than usual.) and Hand Pain (L hand fingers will go numb but states that it has been xrayed but nothing was found wrong)         Assessment & Plan  1. Carpal Tunnel Syndrome.  The patient's symptoms of numbness and tightness in the hand and fingers are consistent with carpal tunnel syndrome. Despite a previous test 6 months ago showing no significant issues, the symptoms persist. She is advised to monitor her symptoms and consider further evaluation if they worsen.    2. Hypertension.  Her blood pressure is elevated, with recent readings around 150, but it has been as high as 196. She is currently taking lisinopril 20 mg twice a day and amlodipine 10 mg daily. A prescription for amlodipine 10 mg has been renewed. She is advised to continue her current medication regimen and monitor her blood pressure regularly. Blood pressure and cholesterol labs have been ordered to assess her cardiovascular risk.    3. Iron Deficiency.  The patient reports cravings for ice, which is a symptom of low iron levels. She has a history of low iron and has previously undergone iron transfusions. An iron profile has been ordered to assess her current iron levels. She is advised to return for blood work within the next week. If the iron levels are low, a referral to the infusion center will be considered.    4. Weight Gain.  The patient reports weight gain and increased appetite, which is concerning given her history of lap band surgery over 15 years ago. She has been provided with contact information for Dr. Keita, who specializes in lap band procedures, for further evaluation and management.    5. Medication Management.  A prescription for alprazolam has been renewed with additional 
Chief Complaint   Patient presents with    Follow-up Chronic Condition     Medication refill of Alprazolam, wants to have Iron checked has been eating more ice than usual.    Hand Pain     L hand fingers will go numb but states that it has been xrayed but nothing was found wrong     \"Have you been to the ER, urgent care clinic since your last visit?  Hospitalized since your last visit?\"    NO    “Have you seen or consulted any other health care providers outside of [facility] since your last visit?”    NO     BP (!) 150/82 (Site: Left Upper Arm, Position: Sitting)   Pulse 75   Temp 97.4 °F (36.3 °C) (Temporal)   Ht 1.575 m (5' 2\")   Wt 69.4 kg (153 lb)   SpO2 98%   BMI 27.98 kg/m²    PHQ-9 Total Score: 0 (2/4/2025  3:43 PM)     [facility] Social Determinants Of Health (Sdoh) Screening Questionnaire       Question 2/4/2025 12:28 PM EST - Filed by Patient    Within the past 12 months, you worried that your food would run out before you got the money to buy more. Never true    Within the past 12 months, the food you bought just didn't last and you didn't have money to get more. Never true    In the past 12 months, has lack of transportation kept you from medical appointments or from getting medications? No    In the past 12 months, has lack of transportation kept you from meetings, work, or from getting things needed for daily living? No    In the last 12 months, was there a time when you were not able to pay the mortgage or rent on time? No    In the past 12 months, how many times have you moved where you were living? 0    At any time in the past 12 months, were you homeless or living in a shelter (including now)? No    In the past 12 months has the electric, gas, oil, or water company threatened to shut off services in your home? No    Would you like resources for any of these topics? No, thank you                     Click Here for Release of Records Request     Identified Patient with 2 
none

## 2025-02-06 ENCOUNTER — LAB (OUTPATIENT)
Facility: CLINIC | Age: 65
End: 2025-02-06

## 2025-02-06 RX ORDER — ROSUVASTATIN CALCIUM 10 MG/1
10 TABLET, COATED ORAL DAILY
Qty: 90 TABLET | Refills: 0 | Status: SHIPPED | OUTPATIENT
Start: 2025-02-06

## 2025-02-07 LAB
ALBUMIN SERPL-MCNC: 4.3 G/DL (ref 3.9–4.9)
ALP SERPL-CCNC: 108 IU/L (ref 44–121)
ALT SERPL-CCNC: 5 IU/L (ref 0–32)
AST SERPL-CCNC: 11 IU/L (ref 0–40)
BASOPHILS # BLD AUTO: 0 X10E3/UL (ref 0–0.2)
BASOPHILS NFR BLD AUTO: 1 %
BILIRUB SERPL-MCNC: <0.2 MG/DL (ref 0–1.2)
BUN SERPL-MCNC: 20 MG/DL (ref 8–27)
BUN/CREAT SERPL: 32 (ref 12–28)
CALCIUM SERPL-MCNC: 9.4 MG/DL (ref 8.7–10.3)
CHLORIDE SERPL-SCNC: 106 MMOL/L (ref 96–106)
CHOLEST SERPL-MCNC: 253 MG/DL (ref 100–199)
CO2 SERPL-SCNC: 23 MMOL/L (ref 20–29)
CREAT SERPL-MCNC: 0.63 MG/DL (ref 0.57–1)
EGFRCR SERPLBLD CKD-EPI 2021: 98 ML/MIN/1.73
EOSINOPHIL # BLD AUTO: 0.1 X10E3/UL (ref 0–0.4)
EOSINOPHIL NFR BLD AUTO: 2 %
ERYTHROCYTE [DISTWIDTH] IN BLOOD BY AUTOMATED COUNT: 16.2 % (ref 11.7–15.4)
GLOBULIN SER CALC-MCNC: 2.3 G/DL (ref 1.5–4.5)
GLUCOSE SERPL-MCNC: 70 MG/DL (ref 70–99)
HCT VFR BLD AUTO: 39.4 % (ref 34–46.6)
HDLC SERPL-MCNC: 120 MG/DL
HGB BLD-MCNC: 12.5 G/DL (ref 11.1–15.9)
IMM GRANULOCYTES # BLD AUTO: 0 X10E3/UL (ref 0–0.1)
IMM GRANULOCYTES NFR BLD AUTO: 0 %
IRON SATN MFR SERPL: 8 % (ref 15–55)
IRON SERPL-MCNC: 36 UG/DL (ref 27–139)
LDLC SERPL CALC-MCNC: 125 MG/DL (ref 0–99)
LYMPHOCYTES # BLD AUTO: 1.5 X10E3/UL (ref 0.7–3.1)
LYMPHOCYTES NFR BLD AUTO: 25 %
MCH RBC QN AUTO: 29.6 PG (ref 26.6–33)
MCHC RBC AUTO-ENTMCNC: 31.7 G/DL (ref 31.5–35.7)
MCV RBC AUTO: 93 FL (ref 79–97)
MONOCYTES # BLD AUTO: 0.5 X10E3/UL (ref 0.1–0.9)
MONOCYTES NFR BLD AUTO: 9 %
NEUTROPHILS # BLD AUTO: 3.7 X10E3/UL (ref 1.4–7)
NEUTROPHILS NFR BLD AUTO: 63 %
PLATELET # BLD AUTO: 312 X10E3/UL (ref 150–450)
POTASSIUM SERPL-SCNC: 4.2 MMOL/L (ref 3.5–5.2)
PROT SERPL-MCNC: 6.6 G/DL (ref 6–8.5)
RBC # BLD AUTO: 4.23 X10E6/UL (ref 3.77–5.28)
SODIUM SERPL-SCNC: 143 MMOL/L (ref 134–144)
TIBC SERPL-MCNC: 478 UG/DL (ref 250–450)
TRIGL SERPL-MCNC: 49 MG/DL (ref 0–149)
UIBC SERPL-MCNC: 442 UG/DL (ref 118–369)
VLDLC SERPL CALC-MCNC: 8 MG/DL (ref 5–40)
WBC # BLD AUTO: 5.8 X10E3/UL (ref 3.4–10.8)

## 2025-02-11 DIAGNOSIS — D50.9 IRON DEFICIENCY ANEMIA, UNSPECIFIED IRON DEFICIENCY ANEMIA TYPE: Primary | ICD-10-CM

## 2025-02-12 ENCOUNTER — TELEPHONE (OUTPATIENT)
Age: 65
End: 2025-02-12

## 2025-02-25 ENCOUNTER — PATIENT MESSAGE (OUTPATIENT)
Facility: CLINIC | Age: 65
End: 2025-02-25

## 2025-02-27 DIAGNOSIS — R63.5 WEIGHT GAIN: Primary | ICD-10-CM

## 2025-02-27 NOTE — PROGRESS NOTES
[facility] [place] [facility]  [address], [address] [place] [address]  W: [phone]  F: [phone]      Reason for Visit:   Christie Mason is a 64 y.o. female who is seen today for evaluation of iron deficiency without anemia.    History of Present Illness:   Christie Mason is a pleasant 64 y.o. female who presents today for evaluation of iron deficiency without anemia.    She saw my colleague Dr. Abbott at [facility] in the past. She has a history of iron deficiency anemia dating back to at least 2016 for which she has taken oral iron. She was treated with venofer x4 doses in 2021, at which time her HGB was as low as 7.7. She had an EGD and colonoscopy around that time which noted erosive gastritis and esophagitis    She was also diagnosed with IgA MGUS in  he past. She has never had a bone marrow biopsy.    More recently she has had recurrence of pica for ice. She had repeat labwork done with her PCP recently which noted low iron saturation, prompting her referral to see me.    She reports feeling poorly. She was in the ED the other day with elevated blood pressure. She has reached out to her PCP to schedule an appointment to address this.    She notes dizziness and significant fatigue for the past 4 months. No dyspnea. She denies any visible blood loss. She reports an EGD a few weeks ago at [facility] and she reports no active bleeding was found but they did see gastritis, she was started on some medication for this.    She has never taken oral iron before.    She is unaccompanied today.    Review of systems was obtained and pertinent findings reviewed above. Past medical history, social history, family history, medications, and allergies are located in the electronic medical record.    Physical Exam:   Visit Vitals  /71   Temp 97.3 °F (36.3 °C)   Ht 1.575 m (5' 2\")   Wt 70 kg (154 lb 6.4 oz)   SpO2 99%   BMI 28.24 kg/m²     General: no distress  Eyes: anicteric sclerae  HENT:

## 2025-02-28 NOTE — TELEPHONE ENCOUNTER
No insurance referral needed, provider in-network, all information faxed to Dr. Cormier's office for patient's appointment

## 2025-03-13 ENCOUNTER — OFFICE VISIT (OUTPATIENT)
Age: 65
End: 2025-03-13
Payer: MEDICARE

## 2025-03-13 VITALS
OXYGEN SATURATION: 99 % | DIASTOLIC BLOOD PRESSURE: 71 MMHG | SYSTOLIC BLOOD PRESSURE: 139 MMHG | WEIGHT: 154.4 LBS | BODY MASS INDEX: 28.41 KG/M2 | TEMPERATURE: 97.3 F | HEIGHT: 62 IN

## 2025-03-13 DIAGNOSIS — D50.9 IRON DEFICIENCY ANEMIA, UNSPECIFIED IRON DEFICIENCY ANEMIA TYPE: Primary | ICD-10-CM

## 2025-03-13 DIAGNOSIS — D47.2 MGUS (MONOCLONAL GAMMOPATHY OF UNKNOWN SIGNIFICANCE): ICD-10-CM

## 2025-03-13 PROCEDURE — 99204 OFFICE O/P NEW MOD 45 MIN: CPT | Performed by: INTERNAL MEDICINE

## 2025-03-13 PROCEDURE — 3078F DIAST BP <80 MM HG: CPT | Performed by: INTERNAL MEDICINE

## 2025-03-13 PROCEDURE — 1123F ACP DISCUSS/DSCN MKR DOCD: CPT | Performed by: INTERNAL MEDICINE

## 2025-03-13 PROCEDURE — 3075F SYST BP GE 130 - 139MM HG: CPT | Performed by: INTERNAL MEDICINE

## 2025-03-13 RX ORDER — SUCRALFATE 1 G/1
TABLET ORAL
COMMUNITY

## 2025-03-13 RX ORDER — ALPRAZOLAM 0.5 MG
0.5 TABLET ORAL
COMMUNITY

## 2025-03-13 NOTE — PATIENT INSTRUCTIONS
Start Ferrous Sulfate 325mg (65mg elemental iron) every day or every other day.      Patient Education        Learning About Iron Supplements  Introduction     People take iron supplements when their bodies do not have enough iron. Your body uses iron to make hemoglobin.  Hemoglobin is part of red blood cells. It carries oxygen through the body. Without enough oxygen, you may feel weak, dizzy, and short of breath. You may tire easily. You also may feel grumpy, have headaches, and have trouble concentrating.  Most people begin to feel normal after a few weeks of taking iron pills. But you need to take the pills for several months to build up the iron supply in your body. This can take up to 6 months.  Examples  Ferrous fumarate (Ferrets, Hemocyte, Ircon)  Ferrous gluconate (Ferate, Fergon)  Ferrous sulfate (Feosol, Davie-Gen-Sol, Davie-in-Sol)  You can buy iron supplements without a prescription. Your doctor will give you directions on how much to take and for how long. Your doctor will also tell you whether you need any testing for iron levels.  Possible side effects  Common side effects may include:  Stomachache.  Nausea.  Constipation.  Black stools.  You may have other side effects or reactions not listed here. Check the information that comes with your medicine.  What to know about taking this medicine  Take your medicines exactly as prescribed. Call your doctor if you think you are having a problem with your medicine.  Try to take the pills on an empty stomach about 1 hour before or 2 hours after meals. But you may need to take the iron with food to avoid a stomachache.  Do not take antacids or drink milk or caffeine drinks (such as coffee, tea, or cola) at the same time or within 2 hours of the time that you take your iron pills. They can keep your body from absorbing the iron well.  Vitamin C helps your body absorb iron. You may want to take iron pills with a glass of orange juice or some other food high in

## 2025-03-13 NOTE — PROGRESS NOTES
Christie Mason is a 64 y.o. female    Chief Complaint   Patient presents with    New Patient       /71   Temp 97.3 °F (36.3 °C)   Ht 1.575 m (5' 2\")   Wt 70 kg (154 lb 6.4 oz)   SpO2 99%   BMI 28.24 kg/m²         1. Have you been to the ER, urgent care clinic since your last visit?  Hospitalized since your last visit? No    2. Have you seen or consulted any other health care providers outside of the [facility] since your last visit?  Include any pap smears or colon screening. No    Learning Assessment:      8/24/2023     9:40 AM   [facility] AMB LEARNING ASSESSMENT   Primary Learner Patient   Primary Language ENGLISH   Learning Preference READING   Answered By patient   Relationship to Learner SELF       Fall Risk Assessment:      9/13/2024    10:35 AM 2/7/2022     7:43 AM 4/12/2021     1:00 PM 4/5/2021    10:04 AM 3/2/2021     8:48 AM   Amb Fall Risk Assessment and TUG Test   Do you feel unsteady or are you worried about falling?  yes       2 or more falls in past year? yes       Fall with injury in past year? yes       Fall in past 12 months?  0   0   Able to walk?  Yes   Yes   Total Score   0 0        Abuse Screening:       No data to display                ADL Screening:       No data to display

## 2025-03-26 ENCOUNTER — OFFICE VISIT (OUTPATIENT)
Age: 65
End: 2025-03-26
Payer: MEDICARE

## 2025-03-26 VITALS
OXYGEN SATURATION: 97 % | DIASTOLIC BLOOD PRESSURE: 80 MMHG | WEIGHT: 153 LBS | HEIGHT: 62 IN | BODY MASS INDEX: 28.16 KG/M2 | SYSTOLIC BLOOD PRESSURE: 156 MMHG | HEART RATE: 72 BPM

## 2025-03-26 DIAGNOSIS — E78.5 HYPERLIPIDEMIA, UNSPECIFIED HYPERLIPIDEMIA TYPE: ICD-10-CM

## 2025-03-26 DIAGNOSIS — I10 ESSENTIAL (PRIMARY) HYPERTENSION: Primary | ICD-10-CM

## 2025-03-26 PROCEDURE — 93005 ELECTROCARDIOGRAM TRACING: CPT | Performed by: SPECIALIST

## 2025-03-26 PROCEDURE — 93010 ELECTROCARDIOGRAM REPORT: CPT | Performed by: SPECIALIST

## 2025-03-26 PROCEDURE — 1123F ACP DISCUSS/DSCN MKR DOCD: CPT | Performed by: SPECIALIST

## 2025-03-26 PROCEDURE — 99214 OFFICE O/P EST MOD 30 MIN: CPT | Performed by: SPECIALIST

## 2025-03-26 PROCEDURE — 3077F SYST BP >= 140 MM HG: CPT | Performed by: SPECIALIST

## 2025-03-26 PROCEDURE — 3079F DIAST BP 80-89 MM HG: CPT | Performed by: SPECIALIST

## 2025-03-26 RX ORDER — HYDROCHLOROTHIAZIDE 25 MG/1
25 TABLET ORAL EVERY MORNING
Qty: 90 TABLET | Refills: 3 | Status: SHIPPED | OUTPATIENT
Start: 2025-03-26

## 2025-03-26 NOTE — PROGRESS NOTES
Patient: Christie Mason  : 1960    Primary Cardiologist: Rohit Goodrich MD. [facility]  Last Office Visit:    Today's Date: 3/26/2025    HISTORY OF PRESENT ILLNESS:     History of Present Illness:    Patient complaints of dizziness and light headedness, states that she believes this is due to low iron. Iron transfusion was denied and patient has been taking iron pills, does not believe this is working per patient. Fatigue, sleeping a lot more than usual.     BP has been high - when she went for a lab band fill and SBP > 200 --> sent to the ED at [facility] - she did not want to be admitted.        PAST MEDICAL HISTORY:     Past Medical History:   Diagnosis Date    Anxiety     Chronic pain     Dyslipidemia     H/O spinal fusion     Hypertension     Microcytic anemia 3/25/2021    Non-nicotine vapor product user     Obesity     s/p weight loss surgery     Psychiatric disorder     anxiety    Smoker        Past Surgical History:   Procedure Laterality Date    BREAST BIOPSY Left     years ago    BREAST BIOPSY Right 2018    Benign    CERVICAL FUSION      CHOLECYSTECTOMY      GASTRIC BYPASS SURGERY      HYSTERECTOMY (CERVIX STATUS UNKNOWN)      HYSTERECTOMY, VAGINAL      LAP BAND ADJUST PROCEDURE      [facility] STEREO BREAST BX W LOC DEVICE 1ST LESION RIGHT Right 2018    [facility] STEROTACTIC LOC BREAST BIOPSY RIGHT 2018 [facility] RAD MAMMO    ORTHOPEDIC SURGERY      foot surgery--bunions--bilateral    ORTHOPEDIC SURGERY      ACDF    REMOVAL GALLBLADDER         CURRENT MEDICATIONS:      Current Outpatient Medications   Medication Sig Dispense Refill    OMEPRAZOLE PO Take 40 mg by mouth daily      ALPRAZolam (XANAX) 0.5 MG tablet 1 tablet. (Patient taking differently: 1 tablet nightly as needed.)      sucralfate (CARAFATE) 1 GM tablet CRUSH 1 TABLET AND DILUTE INTO 20 ML OF WATER AND DRINK WITH 30 ML BY MOUTH TWICE DAILY      rosuvastatin (CRESTOR) 10 MG tablet TAKE 1 TABLET BY MOUTH DAILY 90 tablet 0    amLODIPine

## 2025-03-26 NOTE — PROGRESS NOTES
Chief Complaint   Patient presents with    Hypertension    Follow-up     Overdue     Medication Refill     Vitals:    03/26/25 1403 03/26/25 1415   BP: (!) 166/82 (!) 156/80   BP Site: Left Upper Arm Left Upper Arm   Patient Position: Sitting Sitting   Pulse: 72    SpO2: 97%    Weight: 69.4 kg (153 lb)    Height: 1.575 m (5' 2\")          Chest pain: DENIED     Recent hospital stays: DENIED     Refills: DENIED     Patient complaints of dizziness and light headedness, states that she believes this is due to low iron. Iron transfusion was denied and patient has been taking iron pills, does not believe this is working per patient. Fatigue, sleeping a lot more than usual.

## 2025-03-26 NOTE — PATIENT INSTRUCTIONS
arms.  Lightheadedness or sudden weakness.  A fast or irregular heartbeat.     You have symptoms of a stroke. These may include:  Sudden numbness, tingling, weakness, or loss of movement in your face, arm, or leg, especially on only one side of your body.  Sudden vision changes.  Sudden trouble speaking.  Sudden confusion or trouble understanding simple statements.  Sudden problems with walking or balance.  A sudden, severe headache that is different from past headaches.     You have severe back or belly pain.   Do not wait until your blood pressure comes down on its own. Get help right away.  Call your doctor now or seek immediate care if:    Your blood pressure is much higher than normal (such as 180/120 or higher), but you don't have symptoms.     You think high blood pressure is causing symptoms, such as:  Severe headache.  Blurry vision.   Watch closely for changes in your health, and be sure to contact your doctor if:    Your blood pressure measures higher than your doctor recommends at least 2 times. That means the top number is higher or the bottom number is higher, or both.     You think you may be having side effects from your blood pressure medicine.   Where can you learn more?  Go to https://www.[employer].net/patientEd and enter X567 to learn more about \"High Blood Pressure: Care Instructions.\"  Current as of: October 24, 2024  Content Version: 14.4  © 5765-0866 [employer].   Care instructions adapted under license by [employer]. If you have questions about a medical condition or this instruction, always ask your healthcare professional. [employer] disclaims any warranty or liability for your use of this information.

## 2025-04-07 DIAGNOSIS — D50.9 IRON DEFICIENCY ANEMIA, UNSPECIFIED IRON DEFICIENCY ANEMIA TYPE: ICD-10-CM

## 2025-04-07 DIAGNOSIS — D47.2 MGUS (MONOCLONAL GAMMOPATHY OF UNKNOWN SIGNIFICANCE): ICD-10-CM

## 2025-04-07 RX ORDER — ROSUVASTATIN CALCIUM 10 MG/1
10 TABLET, COATED ORAL DAILY
Qty: 90 TABLET | Refills: 1 | Status: SHIPPED | OUTPATIENT
Start: 2025-04-07

## 2025-04-09 LAB
BASOPHILS # BLD AUTO: 0.1 X10E3/UL (ref 0–0.2)
BASOPHILS NFR BLD AUTO: 1 %
BUN SERPL-MCNC: 15 MG/DL (ref 8–27)
BUN/CREAT SERPL: 24 (ref 12–28)
CALCIUM SERPL-MCNC: 9.6 MG/DL (ref 8.7–10.3)
CHLORIDE SERPL-SCNC: 105 MMOL/L (ref 96–106)
CO2 SERPL-SCNC: 24 MMOL/L (ref 20–29)
CREAT SERPL-MCNC: 0.63 MG/DL (ref 0.57–1)
EGFRCR SERPLBLD CKD-EPI 2021: 98 ML/MIN/1.73
EOSINOPHIL # BLD AUTO: 0.1 X10E3/UL (ref 0–0.4)
EOSINOPHIL NFR BLD AUTO: 2 %
ERYTHROCYTE [DISTWIDTH] IN BLOOD BY AUTOMATED COUNT: 15.4 % (ref 11.7–15.4)
FERRITIN SERPL-MCNC: 44 NG/ML (ref 15–150)
GLUCOSE SERPL-MCNC: 67 MG/DL (ref 70–99)
HCT VFR BLD AUTO: 37.2 % (ref 34–46.6)
HGB BLD-MCNC: 12.3 G/DL (ref 11.1–15.9)
IMM GRANULOCYTES # BLD AUTO: 0 X10E3/UL (ref 0–0.1)
IMM GRANULOCYTES NFR BLD AUTO: 0 %
IRON SATN MFR SERPL: 25 % (ref 15–55)
IRON SERPL-MCNC: 99 UG/DL (ref 27–139)
LYMPHOCYTES # BLD AUTO: 1.3 X10E3/UL (ref 0.7–3.1)
LYMPHOCYTES NFR BLD AUTO: 36 %
MCH RBC QN AUTO: 31.9 PG (ref 26.6–33)
MCHC RBC AUTO-ENTMCNC: 33.1 G/DL (ref 31.5–35.7)
MCV RBC AUTO: 97 FL (ref 79–97)
MONOCYTES # BLD AUTO: 0.3 X10E3/UL (ref 0.1–0.9)
MONOCYTES NFR BLD AUTO: 8 %
NEUTROPHILS # BLD AUTO: 1.9 X10E3/UL (ref 1.4–7)
NEUTROPHILS NFR BLD AUTO: 53 %
PLATELET # BLD AUTO: 345 X10E3/UL (ref 150–450)
POTASSIUM SERPL-SCNC: 4.5 MMOL/L (ref 3.5–5.2)
RBC # BLD AUTO: 3.85 X10E6/UL (ref 3.77–5.28)
SODIUM SERPL-SCNC: 143 MMOL/L (ref 134–144)
TIBC SERPL-MCNC: 404 UG/DL (ref 250–450)
UIBC SERPL-MCNC: 305 UG/DL (ref 118–369)
WBC # BLD AUTO: 3.6 X10E3/UL (ref 3.4–10.8)

## 2025-04-09 NOTE — PROGRESS NOTES
[facility] [place] at [facility]  [address], [address] [facility] [address]  W: [phone]  F: [phone]      Reason for Visit:   Christie Mason is a 64 y.o. female who is seen today for evaluation of iron deficiency without anemia and MGUS.    History of Present Illness:   She reports persistent fatigue throughout the day. Not sleeping well. No bleeding. Some intermittent dizziness/lightheadedness persists.    She has been taking oral iron daily, no side effects from this. No constipation. Pica for ice persists, unchanged.      She is unaccompanied today.    Review of systems was obtained and pertinent findings reviewed above. Past medical history, social history, family history, medications, and allergies are located in the electronic medical record.    Physical Exam:   Visit Vitals  /70 (BP Site: Right Upper Arm, Patient Position: Sitting, BP Cuff Size: Large Adult)   Pulse 55   Temp 97.2 °F (36.2 °C) (Temporal)   Resp 18   Ht 1.575 m (5' 2\")   Wt 69.9 kg (154 lb)   SpO2 100%   BMI 28.17 kg/m²     General: no distress  Respiratory: normal respiratory effort  CV: no peripheral edema  Skin: no rashes; no ecchymoses; no petechiae      Results:     Lab Results   Component Value Date    WBC 3.6 04/08/2025    HGB 12.3 04/08/2025    HCT 37.2 04/08/2025     04/08/2025    MCV 97 04/08/2025    NEUTROABS 1.9 04/08/2025     Lab Results   Component Value Date     04/08/2025    K 4.5 04/08/2025     04/08/2025    CO2 24 04/08/2025    GLUCOSE 67 (L) 04/08/2025    BUN 15 04/08/2025    CREATININE 0.63 04/08/2025    LABGLOM 98 04/08/2025    CALCIUM 9.6 04/08/2025     Lab Results   Component Value Date    BILITOT <0.2 02/06/2025    ALT 5 02/06/2025    AST 11 02/06/2025    ALKPHOS 108 02/06/2025    PROTEIN 6.3 04/08/2025    ALBUMIN 3.7 04/08/2025    GLOB 2.6 05/19/2023    LABGLOB 2.6 04/08/2025     Lab Results   Component Value Date    RETICCTPCT 1.7 03/25/2021    IRON 99

## 2025-04-11 LAB
ALBUMIN SERPL ELPH-MCNC: 3.7 G/DL (ref 2.9–4.4)
ALBUMIN/GLOB SERPL: 1.5 {RATIO} (ref 0.7–1.7)
ALPHA1 GLOB SERPL ELPH-MCNC: 0.3 G/DL (ref 0–0.4)
ALPHA2 GLOB SERPL ELPH-MCNC: 0.5 G/DL (ref 0.4–1)
B-GLOBULIN SERPL ELPH-MCNC: 1 G/DL (ref 0.7–1.3)
GAMMA GLOB SERPL ELPH-MCNC: 0.8 G/DL (ref 0.4–1.8)
GLOBULIN SER-MCNC: 2.6 G/DL (ref 2.2–3.9)
IGA SERPL-MCNC: 210 MG/DL (ref 87–352)
IGG SERPL-MCNC: 845 MG/DL (ref 586–1602)
IGM SERPL-MCNC: 73 MG/DL (ref 26–217)
INTERPRETATION SERPL IEP-IMP: ABNORMAL
KAPPA LC FREE SER-MCNC: 16.9 MG/L (ref 3.3–19.4)
KAPPA LC FREE/LAMBDA FREE SER: 1.22 {RATIO} (ref 0.26–1.65)
LABORATORY COMMENT REPORT: ABNORMAL
LAMBDA LC FREE SERPL-MCNC: 13.9 MG/L (ref 5.7–26.3)
M PROTEIN SERPL ELPH-MCNC: ABNORMAL G/DL
PROT SERPL-MCNC: 6.3 G/DL (ref 6–8.5)

## 2025-04-14 ENCOUNTER — RESULTS FOLLOW-UP (OUTPATIENT)
Age: 65
End: 2025-04-14

## 2025-04-16 LAB
ALDOST SERPL-MCNC: 4.9 NG/DL (ref 0–30)
ALDOST/RENIN PLAS-RTO: 1.1 {RATIO} (ref 0–30)
RENIN PLAS-CCNC: 4.47 NG/ML/HR (ref 0.17–5.38)

## 2025-04-17 ENCOUNTER — OFFICE VISIT (OUTPATIENT)
Age: 65
End: 2025-04-17
Payer: MEDICARE

## 2025-04-17 VITALS
HEIGHT: 62 IN | SYSTOLIC BLOOD PRESSURE: 128 MMHG | DIASTOLIC BLOOD PRESSURE: 70 MMHG | WEIGHT: 154 LBS | OXYGEN SATURATION: 100 % | HEART RATE: 55 BPM | TEMPERATURE: 97.2 F | BODY MASS INDEX: 28.34 KG/M2 | RESPIRATION RATE: 18 BRPM

## 2025-04-17 DIAGNOSIS — D47.2 MGUS (MONOCLONAL GAMMOPATHY OF UNKNOWN SIGNIFICANCE): ICD-10-CM

## 2025-04-17 DIAGNOSIS — D50.9 IRON DEFICIENCY ANEMIA, UNSPECIFIED IRON DEFICIENCY ANEMIA TYPE: Primary | ICD-10-CM

## 2025-04-17 PROCEDURE — 1123F ACP DISCUSS/DSCN MKR DOCD: CPT | Performed by: INTERNAL MEDICINE

## 2025-04-17 PROCEDURE — 3074F SYST BP LT 130 MM HG: CPT | Performed by: INTERNAL MEDICINE

## 2025-04-17 PROCEDURE — 99214 OFFICE O/P EST MOD 30 MIN: CPT | Performed by: INTERNAL MEDICINE

## 2025-04-17 PROCEDURE — 3078F DIAST BP <80 MM HG: CPT | Performed by: INTERNAL MEDICINE

## 2025-04-17 ASSESSMENT — PATIENT HEALTH QUESTIONNAIRE - PHQ9
1. LITTLE INTEREST OR PLEASURE IN DOING THINGS: NOT AT ALL
SUM OF ALL RESPONSES TO PHQ QUESTIONS 1-9: 0
SUM OF ALL RESPONSES TO PHQ QUESTIONS 1-9: 0
2. FEELING DOWN, DEPRESSED OR HOPELESS: NOT AT ALL
SUM OF ALL RESPONSES TO PHQ QUESTIONS 1-9: 0
SUM OF ALL RESPONSES TO PHQ QUESTIONS 1-9: 0

## 2025-04-17 NOTE — PROGRESS NOTES
Christie Mason is a 64 y.o. female is here today for a follow up.    1. Have you been to the ER, urgent care clinic since your last visit?  Hospitalized since your last visit?No    2. Have you seen or consulted any other health care providers outside of the [facility] since your last visit?  Include any pap smears or colon screening. No

## 2025-04-25 ENCOUNTER — TELEPHONE (OUTPATIENT)
Age: 65
End: 2025-04-25

## 2025-04-25 NOTE — TELEPHONE ENCOUNTER
Lm with patient to get her on Ector's schedule the week of 5/12 to review bone marrow biopsy results, gave her office number to call back.

## 2025-04-28 DIAGNOSIS — F41.9 ANXIETY: Primary | ICD-10-CM

## 2025-04-28 RX ORDER — ALPRAZOLAM 0.5 MG
0.5 TABLET ORAL NIGHTLY PRN
Qty: 30 TABLET | Refills: 1 | Status: SHIPPED | OUTPATIENT
Start: 2025-04-28 | End: 2025-05-28

## 2025-04-30 ENCOUNTER — HOSPITAL ENCOUNTER (OUTPATIENT)
Facility: HOSPITAL | Age: 65
Discharge: HOME | End: 2025-05-03
Attending: INTERNAL MEDICINE
Payer: MEDICARE

## 2025-04-30 VITALS
SYSTOLIC BLOOD PRESSURE: 118 MMHG | TEMPERATURE: 99.3 F | OXYGEN SATURATION: 95 % | RESPIRATION RATE: 17 BRPM | DIASTOLIC BLOOD PRESSURE: 96 MMHG | HEART RATE: 65 BPM

## 2025-04-30 DIAGNOSIS — D47.2 MGUS (MONOCLONAL GAMMOPATHY OF UNKNOWN SIGNIFICANCE): ICD-10-CM

## 2025-04-30 LAB
BASOPHILS # BLD: 0.03 K/UL (ref 0–0.1)
BASOPHILS NFR BLD: 0.7 % (ref 0–1)
DIFFERENTIAL METHOD BLD: ABNORMAL
EOSINOPHIL # BLD: 0.09 K/UL (ref 0–0.4)
EOSINOPHIL NFR BLD: 2 % (ref 0–7)
ERYTHROCYTE [DISTWIDTH] IN BLOOD BY AUTOMATED COUNT: 17.1 % (ref 11.5–14.5)
HCT VFR BLD AUTO: 41.8 % (ref 35–47)
HGB BLD-MCNC: 13.4 G/DL (ref 11.5–16)
IMM GRANULOCYTES # BLD AUTO: 0.01 K/UL (ref 0–0.04)
IMM GRANULOCYTES NFR BLD AUTO: 0.2 % (ref 0–0.5)
LYMPHOCYTES # BLD: 1.5 K/UL (ref 0.8–3.5)
LYMPHOCYTES NFR BLD: 32.6 % (ref 12–49)
MCH RBC QN AUTO: 31.2 PG (ref 26–34)
MCHC RBC AUTO-ENTMCNC: 32.1 G/DL (ref 30–36.5)
MCV RBC AUTO: 97.4 FL (ref 80–99)
MONOCYTES # BLD: 0.37 K/UL (ref 0–1)
MONOCYTES NFR BLD: 8 % (ref 5–13)
NEUTS SEG # BLD: 2.6 K/UL (ref 1.8–8)
NEUTS SEG NFR BLD: 56.5 % (ref 32–75)
NRBC # BLD: 0 K/UL (ref 0–0.01)
NRBC BLD-RTO: 0 PER 100 WBC
PLATELET # BLD AUTO: 264 K/UL (ref 150–400)
PMV BLD AUTO: 10.7 FL (ref 8.9–12.9)
RBC # BLD AUTO: 4.29 M/UL (ref 3.8–5.2)
WBC # BLD AUTO: 4.6 K/UL (ref 3.6–11)

## 2025-04-30 PROCEDURE — 88342 IMHCHEM/IMCYTCHM 1ST ANTB: CPT

## 2025-04-30 PROCEDURE — 88364 INSITU HYBRIDIZATION (FISH): CPT

## 2025-04-30 PROCEDURE — 85025 COMPLETE CBC W/AUTO DIFF WBC: CPT

## 2025-04-30 PROCEDURE — 36415 COLL VENOUS BLD VENIPUNCTURE: CPT

## 2025-04-30 PROCEDURE — 88311 DECALCIFY TISSUE: CPT

## 2025-04-30 PROCEDURE — 77012 CT SCAN FOR NEEDLE BIOPSY: CPT

## 2025-04-30 PROCEDURE — 6360000002 HC RX W HCPCS

## 2025-04-30 PROCEDURE — 99152 MOD SED SAME PHYS/QHP 5/>YRS: CPT

## 2025-04-30 PROCEDURE — 88305 TISSUE EXAM BY PATHOLOGIST: CPT

## 2025-04-30 PROCEDURE — 88313 SPECIAL STAINS GROUP 2: CPT

## 2025-04-30 PROCEDURE — 88365 INSITU HYBRIDIZATION (FISH): CPT

## 2025-04-30 RX ORDER — FENTANYL CITRATE 50 UG/ML
100 INJECTION, SOLUTION INTRAMUSCULAR; INTRAVENOUS PRN
Refills: 0 | Status: DISCONTINUED | OUTPATIENT
Start: 2025-04-30 | End: 2025-05-04 | Stop reason: HOSPADM

## 2025-04-30 RX ORDER — MIDAZOLAM HYDROCHLORIDE 1 MG/ML
5 INJECTION, SOLUTION INTRAMUSCULAR; INTRAVENOUS PRN
Status: DISCONTINUED | OUTPATIENT
Start: 2025-04-30 | End: 2025-05-04 | Stop reason: HOSPADM

## 2025-04-30 RX ADMIN — FENTANYL CITRATE 25 MCG: 0.05 INJECTION, SOLUTION INTRAMUSCULAR; INTRAVENOUS at 09:28

## 2025-04-30 RX ADMIN — MIDAZOLAM 1 MG: 1 INJECTION INTRAMUSCULAR; INTRAVENOUS at 09:28

## 2025-04-30 RX ADMIN — FENTANYL CITRATE 25 MCG: 0.05 INJECTION, SOLUTION INTRAMUSCULAR; INTRAVENOUS at 09:21

## 2025-04-30 RX ADMIN — MIDAZOLAM 1 MG: 1 INJECTION INTRAMUSCULAR; INTRAVENOUS at 09:25

## 2025-04-30 RX ADMIN — FENTANYL CITRATE 25 MCG: 0.05 INJECTION, SOLUTION INTRAMUSCULAR; INTRAVENOUS at 09:25

## 2025-04-30 RX ADMIN — MIDAZOLAM 1 MG: 1 INJECTION INTRAMUSCULAR; INTRAVENOUS at 09:21

## 2025-04-30 NOTE — H&P
tablet Take 1 tablet by mouth 2 times daily 180 tablet 3    pantoprazole (PROTONIX) 40 MG tablet TAKE 1 TABLET BY MOUTH DAILY 90 tablet 3    albuterol sulfate HFA (PROVENTIL;VENTOLIN;PROAIR) 108 (90 Base) MCG/ACT inhaler Inhale 2 puffs into the lungs every 4 hours as needed for Wheezing 18 g 1    latanoprost (XALATAN) 0.005 % ophthalmic solution INSTILL 1 DROP INTO BOTH EYES EVERY EVENING AS DIRECTED      azelastine (ASTELIN) 0.1 % nasal spray 1 spray as needed      cromolyn (OPTICROM) 4 % ophthalmic solution Apply 1 drop to eye 4 times daily      fluticasone (FLONASE) 50 MCG/ACT nasal spray SHAKE LIQUID AND USE 2 SPRAYS IN EACH NOSTRIL IN THE MORNING      gabapentin (NEURONTIN) 300 MG capsule Take by mouth 3 times daily as needed.      OMEPRAZOLE PO Take 40 mg by mouth daily (Patient not taking: Reported on 4/30/2025)       Current Facility-Administered Medications   Medication Dose Route Frequency Provider Last Rate Last Admin    midazolam (VERSED) injection 5 mg  5 mg IntraVENous PRN Ethan Hope West, APRN - NP   1 mg at 04/30/25 0928    fentaNYL (SUBLIMAZE) injection 100 mcg  100 mcg IntraVENous PRN Ethan, Hope West, APRN - NP   25 mcg at 04/30/25 0928       ALLERGIES  No Known Allergies    DIAGNOSTIC STUDIES   IMAGING STUDIES  Relevant Imaging studies reviewed      LABS  Lab Results   Component Value Date/Time    WBC 4.6 04/30/2025 08:48 AM    HGB 13.4 04/30/2025 08:48 AM    HCT 41.8 04/30/2025 08:48 AM     04/30/2025 08:48 AM    MCV 97.4 04/30/2025 08:48 AM     Lab Results   Component Value Date/Time     04/08/2025 02:35 PM    K 4.5 04/08/2025 02:35 PM     04/08/2025 02:35 PM    CO2 24 04/08/2025 02:35 PM    BUN 15 04/08/2025 02:35 PM    GFRAA >60 04/12/2022 03:47 PM     No results found for: \"INR\", \"PT1\"      PHYSICAL EXAM   /60   Pulse 54   Temp 99.3 °F (37.4 °C) (Axillary)   Resp 16   SpO2 99%    General:  NAD  Heart:  RRR  Lungs:  NWOB  Neurological:  AAOX3      ASSESSMENT

## 2025-04-30 NOTE — PROGRESS NOTES
0825: Patient to radiology holding from waiting area at for CT guided bone marrow biopsy. Patient is accompanied by family member who can be reached at [phone].   Verified patient ID, allergies, and NPO status. Patient is AOx4. Lung and heart sounds auscultated, clear bilaterally.  Patient is sinus anson on the cardiac monitor.  Patient is afebrile, and pain is 0 out of 10 on the numeric scale. Patient tolerated PIV placed to right AC with positive blood return.  Labs sent to include CBC with diff and peripheral smear    0855: Roxanna Long NP to bedside to obtain procedural consent. ASA and airway assessed by provider. Opportunity for questions provided, patient verbalized understanding. Sedation plans discussed with provider.      0915: Patient transported to CT for procedure. Positioned prone on CT table and placed on monitor with oxygen by nasal cannula. Staff members present: Kiera Bryan RN, Roxanna Long NP, Kasi Baker RTR, Tess Ramires Cyto    Time out performed at 0924  Procedure start time 0925    Patient monitored throughout the procedure, to include ETCO2, cardiac, blood pressure, O2 saturation.  Vitals remained stable throughout the procedure. Bone marrow core and aspirate provided to pathology in room. Gauze and tegaderm dressing applied to Left posterior iliac crest.    Patient received a total of 75 mcg fentanyl and 3 mg versed intra-procedure  Procedure end time 0929      0940: Returned to radiology holding. Patient tolerating PO, pain is 0 out of 10 on the numeric scale. Family member updated with plan of care. Provided patient with verbal and printed discharge instructions, patient verbalized understanding and will follow up with ordering provider for results. PIV removed, lower back dressing is clean/dry/intact at time of discharge. Patient discharged by wheelchair to care of family member at 1035.

## 2025-05-07 ENCOUNTER — TELEPHONE (OUTPATIENT)
Age: 65
End: 2025-05-07

## 2025-05-07 NOTE — TELEPHONE ENCOUNTER
[facility] [place] at [facility]  [phone]    05/07/25- Patient reports generalized body aches starting after her bone marrow biopsy last week.  Intermittent during the day, worse at night.  Unable to sleep the last 2 nights because of the discomfort.  Stated it feels like a tingling, throbbing pain in her bones.  Tried tylenol and ibuprofen with no relief.  No fevers.  Biopsy site is still painful, especially when sitting too long.  Will discuss with Marlene Vela and call patient back with recommendations.      1:47 PM- Informed patient that pain/soreness at the biopsy site can be expected, but not typical to have generalized body aches.  Encouraged warm compress to biopsy site.  Continue alternating tylenol and ibuprofen.  Can take warm bath or shower to help with aches.  Symptoms should resolve on their own.  Patient verbalized understanding and was appreciative of the call.

## 2025-05-07 NOTE — TELEPHONE ENCOUNTER
Pt called in stating she had a bone biopsy last week. Pt stated she's been experiencing body aches. Pt said she's not able to sleep. Please advise     CB#  [phone]

## 2025-05-12 ENCOUNTER — TELEPHONE (OUTPATIENT)
Age: 65
End: 2025-05-12

## 2025-05-12 NOTE — TELEPHONE ENCOUNTER
[facility] [place] at [facility]  [phone]    05/12/25- Patient stated she's concerned seeing that some lab work came back as \"abnormal\" in MyChart.  Reassured her that Dr. Barney will review all results at her appointment on Thursday.  Offered to move her appointment up sooner to an opening Wednesday morning.  She was appreciative, appointment rescheduled to 5/14 at 8:50 am.

## 2025-05-12 NOTE — TELEPHONE ENCOUNTER
Patient called and stated that she would like a call back to discuss what was coming up as abnormal on her latest blood work.     # [phone]

## 2025-05-14 ENCOUNTER — OFFICE VISIT (OUTPATIENT)
Age: 65
End: 2025-05-14
Payer: MEDICARE

## 2025-05-14 VITALS
SYSTOLIC BLOOD PRESSURE: 164 MMHG | DIASTOLIC BLOOD PRESSURE: 89 MMHG | HEIGHT: 62 IN | RESPIRATION RATE: 16 BRPM | OXYGEN SATURATION: 100 % | BODY MASS INDEX: 28.52 KG/M2 | TEMPERATURE: 97.2 F | HEART RATE: 66 BPM | WEIGHT: 155 LBS

## 2025-05-14 DIAGNOSIS — D47.2 MGUS (MONOCLONAL GAMMOPATHY OF UNKNOWN SIGNIFICANCE): ICD-10-CM

## 2025-05-14 DIAGNOSIS — D50.9 IRON DEFICIENCY ANEMIA, UNSPECIFIED IRON DEFICIENCY ANEMIA TYPE: Primary | ICD-10-CM

## 2025-05-14 PROCEDURE — 3079F DIAST BP 80-89 MM HG: CPT | Performed by: INTERNAL MEDICINE

## 2025-05-14 PROCEDURE — G2211 COMPLEX E/M VISIT ADD ON: HCPCS | Performed by: INTERNAL MEDICINE

## 2025-05-14 PROCEDURE — 1123F ACP DISCUSS/DSCN MKR DOCD: CPT | Performed by: INTERNAL MEDICINE

## 2025-05-14 PROCEDURE — 99214 OFFICE O/P EST MOD 30 MIN: CPT | Performed by: INTERNAL MEDICINE

## 2025-05-14 PROCEDURE — 3077F SYST BP >= 140 MM HG: CPT | Performed by: INTERNAL MEDICINE

## 2025-05-14 ASSESSMENT — PATIENT HEALTH QUESTIONNAIRE - PHQ9
2. FEELING DOWN, DEPRESSED OR HOPELESS: NOT AT ALL
SUM OF ALL RESPONSES TO PHQ QUESTIONS 1-9: 0
1. LITTLE INTEREST OR PLEASURE IN DOING THINGS: NOT AT ALL

## 2025-05-14 NOTE — PROGRESS NOTES
Identified pt with two pt identifiers(name and ). Reviewed record in preparation for visit and have obtained necessary documentation. All patient medications has been reviewed.    Chief Complaint   Patient presents with    Results     Bone Marrow Biopsy          Vitals:    25 0832 25 0839   BP: (!) 168/82 (!) 164/89   BP Site: Left Upper Arm Right Upper Arm   Patient Position: Sitting Sitting   BP Cuff Size: Medium Adult Small Adult   Pulse: 66    Resp: 16    Temp: 97.2 °F (36.2 °C)    TempSrc: Temporal    SpO2: 100%    Weight: 70.3 kg (155 lb)    Height: 1.575 m (5' 2\")       .  \"Have you been to the ER, urgent care clinic since your last visit?  Hospitalized since your last visit?\"    no    “Have you seen or consulted any other health care providers outside our system since your last visit?”    no          
  04/08/2025 02:36 PM 16.9 3.3 - 19.4 mg/L Final     FREE KAPPA LT CHAINS, SERUM   Date/Time Value Ref Range Status   04/12/2022 03:47 PM 22.7 (H) 3.3 - 19.4 mg/L Final   08/25/2021 03:50 PM 18.1 3.3 - 19.4 mg/L Final   04/05/2021 10:12 AM 17.7 3.3 - 19.4 mg/L Final   04/05/2021 10:12 AM 17.3 3.3 - 19.4 mg/L Final     Free Lambda Light Chains   Date/Time Value Ref Range Status   04/08/2025 02:36 PM 13.9 5.7 - 26.3 mg/L Final     FREE LAMBDA LT CHAINS, SERUM   Date/Time Value Ref Range Status   04/12/2022 03:47 PM 14.1 5.7 - 26.3 mg/L Final   08/25/2021 03:50 PM 15.8 5.7 - 26.3 mg/L Final   04/05/2021 10:12 AM 15.9 5.7 - 26.3 mg/L Final   04/05/2021 10:12 AM 14.8 5.7 - 26.3 mg/L Final     K/L Ratio   Date/Time Value Ref Range Status   04/08/2025 02:36 PM 1.22 0.26 - 1.65 Final     KAPPA/LAMBDA RATIO, SERUM   Date/Time Value Ref Range Status   04/12/2022 03:47 PM 1.61 0.26 - 1.65   Final     Comment:     (NOTE)  Performed At: [address] [id number]  Matthew Ho MD Ph:[phone]     08/25/2021 03:50 PM 1.15 0.26 - 1.65   Final     Comment:     (NOTE)  Performed At: [address] [id number]  Matthew Ho MD Ph:[phone]     04/05/2021 10:12 AM 1.11 0.26 - 1.65   Final     Comment:     (NOTE)  Performed At: [address] [id number]  Mathtew Ho MD Ph:[phone]     04/05/2021 10:12 AM 1.17 0.26 - 1.65   Final     Comment:     (NOTE)  Performed At:  [facility] [id number]  Matthew Ho MD Ph:[phone]         8/25/2021:  PATO: IgA monoclonal protein with lambda light chain specificity.       3/2/2025:  WBC: 5.3  HGB: 13.4  PLT: 265  MCV: 95.2  Creatinine: 0.5    4/8/2025  PATO: biclonal IgA protein with lambda specificity     EGD by Dr. Keita 2/21/2025: normal esophagus, mucosal changes suspicious for gastritis. Adjustable gastric band noted. Normal

## 2025-05-15 RX ORDER — ROSUVASTATIN CALCIUM 10 MG/1
10 TABLET, COATED ORAL DAILY
Qty: 90 TABLET | Refills: 3 | Status: SHIPPED | OUTPATIENT
Start: 2025-05-15

## 2025-05-15 NOTE — TELEPHONE ENCOUNTER
Refill per VO of Dr. Narcisa Goodrich  Last appt: 3/26/2025    Future Appointments   Date Time Provider Department Center   5/29/2025  1:20 PM Janine Santiago, APRN - NP [facility] BS AMB   6/5/2025  2:00 PM Gabi Schmitz, APRN - NP [facility] DEP   7/18/2025 10:15 AM Elsy Fitch MD [facility] AMB   5/14/2026  9:30 AM Jose D Barney MD [facility] AMB       Requested Prescriptions     Signed Prescriptions Disp Refills    rosuvastatin (CRESTOR) 10 MG tablet 90 tablet 3     Sig: TAKE 1 TABLET BY MOUTH DAILY     Authorizing Provider: NARCISA GOODRICH     Ordering User: GITA FAY

## 2025-05-28 ENCOUNTER — PATIENT MESSAGE (OUTPATIENT)
Facility: CLINIC | Age: 65
End: 2025-05-28

## 2025-05-28 NOTE — TELEPHONE ENCOUNTER
Spoke with the patient and made her aware that NP  does not have any available appts between today or tomorrow.

## 2025-05-29 ENCOUNTER — OFFICE VISIT (OUTPATIENT)
Facility: CLINIC | Age: 65
End: 2025-05-29
Payer: MEDICARE

## 2025-05-29 VITALS
BODY MASS INDEX: 29.44 KG/M2 | WEIGHT: 160 LBS | DIASTOLIC BLOOD PRESSURE: 104 MMHG | TEMPERATURE: 96.9 F | HEART RATE: 76 BPM | HEIGHT: 62 IN | OXYGEN SATURATION: 97 % | SYSTOLIC BLOOD PRESSURE: 210 MMHG

## 2025-05-29 DIAGNOSIS — M54.31 BILATERAL SCIATICA: ICD-10-CM

## 2025-05-29 DIAGNOSIS — M54.32 BILATERAL SCIATICA: ICD-10-CM

## 2025-05-29 DIAGNOSIS — I10 ESSENTIAL (PRIMARY) HYPERTENSION: Primary | ICD-10-CM

## 2025-05-29 PROCEDURE — 3080F DIAST BP >= 90 MM HG: CPT | Performed by: NURSE PRACTITIONER

## 2025-05-29 PROCEDURE — 99214 OFFICE O/P EST MOD 30 MIN: CPT | Performed by: NURSE PRACTITIONER

## 2025-05-29 PROCEDURE — 3077F SYST BP >= 140 MM HG: CPT | Performed by: NURSE PRACTITIONER

## 2025-05-29 PROCEDURE — 1123F ACP DISCUSS/DSCN MKR DOCD: CPT | Performed by: NURSE PRACTITIONER

## 2025-05-29 RX ORDER — VALSARTAN 320 MG/1
320 TABLET ORAL DAILY
Qty: 30 TABLET | Refills: 3 | Status: SHIPPED | OUTPATIENT
Start: 2025-05-29

## 2025-05-29 RX ORDER — PREDNISONE 10 MG/1
TABLET ORAL
Qty: 1 EACH | Refills: 0 | Status: SHIPPED | OUTPATIENT
Start: 2025-05-29

## 2025-05-29 RX ORDER — TRAMADOL HYDROCHLORIDE 50 MG/1
50 TABLET ORAL 3 TIMES DAILY PRN
Qty: 60 TABLET | Refills: 0 | Status: SHIPPED | OUTPATIENT
Start: 2025-05-29 | End: 2025-06-28

## 2025-05-29 NOTE — PROGRESS NOTES
Christie Mason (:  1960) is a 64 y.o. female, Established patient, here for evaluation of the following chief complaint(s):  Leg Pain (Radiates from back and making toes tingle) and Back Pain (X 1 week)         Assessment & Plan  1. Bilateral Sciatica.  - The patient reports persistent pain for a week, unrelieved by muscle relaxers or gabapentin.  - Pain is described as sharp and radiating to the buttocks and legs, suggestive of sciatic nerve involvement.  - Discussed the potential need for further evaluation if symptoms do not improve by early next week.  - Prescribed a prednisone pack and tramadol 50 mg, to be taken 3 times daily as needed.    2. Hypertension.  - Blood pressure readings today are significantly elevated, with a recent reading of 210/90.  - Previous readings showed some improvement 2 weeks ago.  - Advised to reduce salt intake to help manage blood pressure.  - Lisinopril will be replaced with valsartan, to be taken once daily. The patient will continue with amlodipine and the diuretic.    Results  Labs   - Bone marrow test: Normal  1. Essential (primary) hypertension  -     valsartan (DIOVAN) 320 MG tablet; Take 1 tablet by mouth daily, Disp-30 tablet, R-3Normal  2. Bilateral sciatica  -     predniSONE 10 MG (21) TBPK; As directed on package - 6 days, Disp-1 each, R-0Normal  -     traMADol (ULTRAM) 50 MG tablet; Take 1 tablet by mouth 3 times daily as needed for Pain for up to 30 days. Intended supply: 5 days. Take lowest dose possible to manage pain Max Daily Amount: 150 mg, Disp-60 tablet, R-0Normal    No follow-ups on file.       Subjective   History of Present Illness  The patient presents for evaluation of bilateral sciatica and hypertension.    She has been experiencing severe pain for the past week, which she describes as sudden in onset. The pain is localized to her buttocks and radiates down both legs, suggestive of sciatic nerve involvement. She also reports sharp pelvic

## 2025-05-29 NOTE — PROGRESS NOTES
Chief Complaint   Patient presents with    Leg Pain     Radiates from back and making toes tingle    Back Pain     X 1 week     \"Have you been to the ER, urgent care clinic since your last visit?  Hospitalized since your last visit?\"    YES - When: approximately 1 months ago.  Where and Why: [facility] for bone marrow biopsy.    “Have you seen or consulted any other health care providers outside of [facility] since your last visit?”    NO     BP (!) 210/104 (BP Site: Right Upper Arm, Patient Position: Sitting)   Pulse 76   Temp 96.9 °F (36.1 °C) (Temporal)   Ht 1.575 m (5' 2\")   Wt 72.6 kg (160 lb)   SpO2 97%   BMI 29.26 kg/m²      No data recorded         No questionnaires available.                                  Click Here for Release of Records Request     Identified Patient with 2 Patient Identifiers-Name and

## 2025-06-03 ENCOUNTER — TELEPHONE (OUTPATIENT)
Facility: CLINIC | Age: 65
End: 2025-06-03

## 2025-06-03 SDOH — HEALTH STABILITY: PHYSICAL HEALTH: ON AVERAGE, HOW MANY DAYS PER WEEK DO YOU ENGAGE IN MODERATE TO STRENUOUS EXERCISE (LIKE A BRISK WALK)?: 0 DAYS

## 2025-06-03 ASSESSMENT — LIFESTYLE VARIABLES
HOW MANY STANDARD DRINKS CONTAINING ALCOHOL DO YOU HAVE ON A TYPICAL DAY: 1
HOW OFTEN DO YOU HAVE SIX OR MORE DRINKS ON ONE OCCASION: 1
HOW MANY STANDARD DRINKS CONTAINING ALCOHOL DO YOU HAVE ON A TYPICAL DAY: 1 OR 2
HOW OFTEN DO YOU HAVE A DRINK CONTAINING ALCOHOL: MONTHLY OR LESS
HOW OFTEN DO YOU HAVE A DRINK CONTAINING ALCOHOL: 2

## 2025-06-03 ASSESSMENT — PATIENT HEALTH QUESTIONNAIRE - PHQ9
SUM OF ALL RESPONSES TO PHQ QUESTIONS 1-9: 0
1. LITTLE INTEREST OR PLEASURE IN DOING THINGS: NOT AT ALL
2. FEELING DOWN, DEPRESSED OR HOPELESS: NOT AT ALL
SUM OF ALL RESPONSES TO PHQ QUESTIONS 1-9: 0

## 2025-06-05 ENCOUNTER — OFFICE VISIT (OUTPATIENT)
Facility: CLINIC | Age: 65
End: 2025-06-05

## 2025-06-05 VITALS
OXYGEN SATURATION: 97 % | TEMPERATURE: 97.1 F | DIASTOLIC BLOOD PRESSURE: 78 MMHG | BODY MASS INDEX: 28.6 KG/M2 | HEIGHT: 62 IN | SYSTOLIC BLOOD PRESSURE: 142 MMHG | WEIGHT: 155.4 LBS | HEART RATE: 76 BPM

## 2025-06-05 DIAGNOSIS — E78.00 HYPERCHOLESTEROLEMIA: ICD-10-CM

## 2025-06-05 DIAGNOSIS — Z87.891 PERSONAL HISTORY OF TOBACCO USE: ICD-10-CM

## 2025-06-05 DIAGNOSIS — D50.9 IRON DEFICIENCY ANEMIA, UNSPECIFIED IRON DEFICIENCY ANEMIA TYPE: ICD-10-CM

## 2025-06-05 DIAGNOSIS — F41.9 ANXIETY: ICD-10-CM

## 2025-06-05 DIAGNOSIS — I10 ESSENTIAL (PRIMARY) HYPERTENSION: ICD-10-CM

## 2025-06-05 DIAGNOSIS — Z00.00 WELL EXAM WITHOUT ABNORMAL FINDINGS OF PATIENT 18 YEARS OF AGE OR OLDER: Primary | ICD-10-CM

## 2025-06-05 DIAGNOSIS — Z13.1 SCREENING FOR DIABETES MELLITUS: ICD-10-CM

## 2025-06-05 RX ORDER — ALPRAZOLAM 0.5 MG
0.5 TABLET ORAL 2 TIMES DAILY PRN
Qty: 45 TABLET | Refills: 1 | Status: SHIPPED | OUTPATIENT
Start: 2025-06-05 | End: 2025-07-05

## 2025-06-05 RX ORDER — ALPRAZOLAM 0.5 MG
TABLET ORAL
COMMUNITY
Start: 2025-05-30 | End: 2025-06-05 | Stop reason: SDUPTHER

## 2025-06-05 NOTE — PROGRESS NOTES
Chief Complaint   Patient presents with    Medicare AWV     \"Have you been to the ER, urgent care clinic since your last visit?  Hospitalized since your last visit?\"    NO    “Have you seen or consulted any other health care providers outside of [facility] since your last visit?”    NO     BP (!) 140/90 (BP Site: Left Upper Arm, Patient Position: Sitting)   Pulse 76   Temp 97.1 °F (36.2 °C) (Temporal)   Ht 1.575 m (5' 2\")   Wt 70.5 kg (155 lb 6.4 oz)   SpO2 97%   BMI 28.42 kg/m²      PHQ-9 Total Score: (Proxy-Rptd) 0 (6/3/2025 11:54 AM)       Medicare Awv Health Risk Assessment / Depression Screen       Question 6/3/2025 11:54 AM EDT - Filed by Renay Landeros LPN    Fall Risk Screening     Do you feel unsteady or are you worried about falling? yes    Have you fallen 2 or more times in the past year?   no    Have you had any fall with injury in the past year?   no    Health Risk Assessment / General     In general, how would you say your health is? Fair    In the past 7 days, have you experienced any of the following: New or Increased Pain, New or Increased Fatigue, Loneliness, Social Isolation, Stress or Anger? Yes    Select all that apply New or Increased Pain    Do you have a Living Will? No    Health Habits/ Nutrition     On average, how many days per week do you engage in moderate to strenuous exercise (like a brisk walk)? 0 days    On average, how many minutes do you engage in exercise at this level?       Do you eat balanced/healthy meals regularly? Yes    Have you seen the dentist within the past year? Yes    Hearing / Vision     Have you had an eye exam within the past year? Yes    Do you have difficulty driving, watching TV, or doing any of your daily activities because of your eyesight? No    Do you or your family notice any trouble with your hearing that hasn't been managed with hearing aids? No    Safety     Do you have working smoke detectors? Yes    Do you have any tripping hazards

## 2025-06-06 LAB
BASOPHILS # BLD AUTO: 0 X10E3/UL (ref 0–0.2)
BASOPHILS NFR BLD AUTO: 1 %
EOSINOPHIL # BLD AUTO: 0.1 X10E3/UL (ref 0–0.4)
EOSINOPHIL NFR BLD AUTO: 2 %
ERYTHROCYTE [DISTWIDTH] IN BLOOD BY AUTOMATED COUNT: 13.4 % (ref 11.7–15.4)
HBA1C MFR BLD: 5 % (ref 4.8–5.6)
HCT VFR BLD AUTO: 41.2 % (ref 34–46.6)
HGB BLD-MCNC: 13.5 G/DL (ref 11.1–15.9)
IMM GRANULOCYTES # BLD AUTO: 0 X10E3/UL (ref 0–0.1)
IMM GRANULOCYTES NFR BLD AUTO: 0 %
LYMPHOCYTES # BLD AUTO: 1.4 X10E3/UL (ref 0.7–3.1)
LYMPHOCYTES NFR BLD AUTO: 29 %
MCH RBC QN AUTO: 32.3 PG (ref 26.6–33)
MCHC RBC AUTO-ENTMCNC: 32.8 G/DL (ref 31.5–35.7)
MCV RBC AUTO: 99 FL (ref 79–97)
MONOCYTES # BLD AUTO: 0.4 X10E3/UL (ref 0.1–0.9)
MONOCYTES NFR BLD AUTO: 8 %
NEUTROPHILS # BLD AUTO: 2.9 X10E3/UL (ref 1.4–7)
NEUTROPHILS NFR BLD AUTO: 60 %
PLATELET # BLD AUTO: 228 X10E3/UL (ref 150–450)
RBC # BLD AUTO: 4.18 X10E6/UL (ref 3.77–5.28)
WBC # BLD AUTO: 4.7 X10E3/UL (ref 3.4–10.8)

## 2025-06-07 LAB
ALBUMIN SERPL-MCNC: 4.3 G/DL (ref 3.9–4.9)
ALP SERPL-CCNC: 107 IU/L (ref 44–121)
ALT SERPL-CCNC: 6 IU/L (ref 0–32)
AST SERPL-CCNC: 12 IU/L (ref 0–40)
BILIRUB SERPL-MCNC: <0.2 MG/DL (ref 0–1.2)
BUN SERPL-MCNC: 13 MG/DL (ref 8–27)
BUN/CREAT SERPL: 20 (ref 12–28)
CALCIUM SERPL-MCNC: 9.7 MG/DL (ref 8.7–10.3)
CHLORIDE SERPL-SCNC: 108 MMOL/L (ref 96–106)
CHOLEST SERPL-MCNC: 219 MG/DL (ref 100–199)
CO2 SERPL-SCNC: 20 MMOL/L (ref 20–29)
CREAT SERPL-MCNC: 0.66 MG/DL (ref 0.57–1)
EGFRCR SERPLBLD CKD-EPI 2021: 97 ML/MIN/1.73
GLOBULIN SER CALC-MCNC: 2.2 G/DL (ref 1.5–4.5)
GLUCOSE SERPL-MCNC: 70 MG/DL (ref 70–99)
HDLC SERPL-MCNC: 103 MG/DL
LDLC SERPL CALC-MCNC: 109 MG/DL (ref 0–99)
POTASSIUM SERPL-SCNC: 4.5 MMOL/L (ref 3.5–5.2)
PROT SERPL-MCNC: 6.5 G/DL (ref 6–8.5)
SODIUM SERPL-SCNC: 142 MMOL/L (ref 134–144)
TRIGL SERPL-MCNC: 41 MG/DL (ref 0–149)
VLDLC SERPL CALC-MCNC: 7 MG/DL (ref 5–40)

## 2025-06-11 ENCOUNTER — RESULTS FOLLOW-UP (OUTPATIENT)
Facility: CLINIC | Age: 65
End: 2025-06-11

## 2025-07-18 ENCOUNTER — OFFICE VISIT (OUTPATIENT)
Age: 65
End: 2025-07-18
Payer: MEDICARE

## 2025-07-18 VITALS
HEIGHT: 62 IN | BODY MASS INDEX: 28.71 KG/M2 | SYSTOLIC BLOOD PRESSURE: 136 MMHG | WEIGHT: 156 LBS | OXYGEN SATURATION: 97 % | RESPIRATION RATE: 18 BRPM | DIASTOLIC BLOOD PRESSURE: 80 MMHG | HEART RATE: 78 BPM

## 2025-07-18 DIAGNOSIS — E04.1 THYROID NODULE: Primary | ICD-10-CM

## 2025-07-18 PROCEDURE — 3075F SYST BP GE 130 - 139MM HG: CPT | Performed by: OTOLARYNGOLOGY

## 2025-07-18 PROCEDURE — 3079F DIAST BP 80-89 MM HG: CPT | Performed by: OTOLARYNGOLOGY

## 2025-07-18 PROCEDURE — 1123F ACP DISCUSS/DSCN MKR DOCD: CPT | Performed by: OTOLARYNGOLOGY

## 2025-07-18 PROCEDURE — 99213 OFFICE O/P EST LOW 20 MIN: CPT | Performed by: OTOLARYNGOLOGY
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
## 2025-07-29 NOTE — PROGRESS NOTES
Patient: Christie Mason  : 1960    Primary Cardiologist: Rohit Goodrich MD. [facility]    Today's Date: 2025    HISTORY OF PRESENT ILLNESS:     History of Present Illness:  Presents today for follow-up. BP at home up to 170s. Denies chest pain, shortness of breath, edema, palpitations.       PAST MEDICAL HISTORY:     Past Medical History:   Diagnosis Date    Anxiety     Chronic pain     Dyslipidemia     H/O spinal fusion     Hypertension     Microcytic anemia 3/25/2021    Non-nicotine vapor product user     Obesity     s/p weight loss surgery     Psychiatric disorder     anxiety    Smoker        Past Surgical History:   Procedure Laterality Date    BREAST BIOPSY Left     years ago    BREAST BIOPSY Right 2018    Benign    CERVICAL FUSION      CHOLECYSTECTOMY      COLONOSCOPY      GASTRIC BYPASS SURGERY      HYSTERECTOMY (CERVIX STATUS UNKNOWN)      HYSTERECTOMY, VAGINAL      LAP BAND ADJUST PROCEDURE      CALDERON STEREO BREAST BX W LOC DEVICE 1ST LESION RIGHT Right 2018    CALDERON STEROTACTIC LOC BREAST BIOPSY RIGHT 2018 [facility]    ORTHOPEDIC SURGERY      foot surgery--bunions--bilateral    ORTHOPEDIC SURGERY      ACDF    REMOVAL GALLBLADDER      SPINE SURGERY         CURRENT MEDICATIONS:      Current Outpatient Medications   Medication Sig Dispense Refill    spironolactone (ALDACTONE) 25 MG tablet Take 1 tablet by mouth daily 30 tablet 3    valsartan (DIOVAN) 320 MG tablet Take 1 tablet by mouth daily 30 tablet 3    rosuvastatin (CRESTOR) 10 MG tablet TAKE 1 TABLET BY MOUTH DAILY 90 tablet 3    OMEPRAZOLE PO Take 40 mg by mouth daily      hydroCHLOROthiazide (HYDRODIURIL) 25 MG tablet Take 1 tablet by mouth every morning 90 tablet 3    amLODIPine (NORVASC) 10 MG tablet Take 1 tablet by mouth daily 90 tablet 3    pantoprazole (PROTONIX) 40 MG tablet TAKE 1 TABLET BY MOUTH DAILY 90 tablet 3    albuterol sulfate HFA (PROVENTIL;VENTOLIN;PROAIR) 108 (90 Base) MCG/ACT inhaler Inhale 2 puffs

## 2025-08-01 ENCOUNTER — TRANSCRIBE ORDERS (OUTPATIENT)
Facility: HOSPITAL | Age: 65
End: 2025-08-01

## 2025-08-01 ENCOUNTER — OFFICE VISIT (OUTPATIENT)
Age: 65
End: 2025-08-01
Payer: MEDICARE

## 2025-08-01 ENCOUNTER — HOSPITAL ENCOUNTER (OUTPATIENT)
Facility: HOSPITAL | Age: 65
Discharge: HOME | End: 2025-08-01
Payer: MEDICARE

## 2025-08-01 VITALS
DIASTOLIC BLOOD PRESSURE: 80 MMHG | HEIGHT: 62 IN | OXYGEN SATURATION: 99 % | WEIGHT: 156 LBS | BODY MASS INDEX: 28.71 KG/M2 | HEART RATE: 76 BPM | SYSTOLIC BLOOD PRESSURE: 138 MMHG

## 2025-08-01 DIAGNOSIS — I10 ESSENTIAL (PRIMARY) HYPERTENSION: Primary | ICD-10-CM

## 2025-08-01 DIAGNOSIS — S42.295D OTHER CLOSED NONDISPLACED FRACTURE OF PROXIMAL END OF LEFT HUMERUS WITH ROUTINE HEALING, SUBSEQUENT ENCOUNTER: Primary | ICD-10-CM

## 2025-08-01 DIAGNOSIS — I10 ESSENTIAL (PRIMARY) HYPERTENSION: ICD-10-CM

## 2025-08-01 DIAGNOSIS — M75.112 PARTIAL NONTRAUMATIC TEAR OF ROTATOR CUFF, LEFT: ICD-10-CM

## 2025-08-01 DIAGNOSIS — E78.5 HYPERLIPIDEMIA, UNSPECIFIED HYPERLIPIDEMIA TYPE: ICD-10-CM

## 2025-08-01 DIAGNOSIS — E04.1 THYROID NODULE: ICD-10-CM

## 2025-08-01 DIAGNOSIS — F17.200 SMOKER: ICD-10-CM

## 2025-08-01 PROCEDURE — 3075F SYST BP GE 130 - 139MM HG: CPT

## 2025-08-01 PROCEDURE — 99214 OFFICE O/P EST MOD 30 MIN: CPT

## 2025-08-01 PROCEDURE — 3079F DIAST BP 80-89 MM HG: CPT

## 2025-08-01 PROCEDURE — 76536 US EXAM OF HEAD AND NECK: CPT

## 2025-08-01 PROCEDURE — 1123F ACP DISCUSS/DSCN MKR DOCD: CPT

## 2025-08-01 RX ORDER — SPIRONOLACTONE 25 MG/1
25 TABLET ORAL DAILY
Qty: 30 TABLET | Refills: 3 | Status: SHIPPED | OUTPATIENT
Start: 2025-08-01

## 2025-08-01 NOTE — PATIENT INSTRUCTIONS
Please begin taking aldactone 25 mg daily.   Have labs drawn in 2 weeks.   Continue to monitor blood pressure at home.   Send me a [employer] message in 2 weeks with readings.     Parameters:  Systolic (top number) 100-160  Diastolic (bottom number) 50-99  Heart rate       How to get your blood pressure checked:   Before  During  After    In the 30 minutes before your BP is taken:   NO Smoking   NO Caffeine   NO Exercise  Make sure the cuff is the right size and in the right place.  Wait 5 minutes and retake your BP if needed. In case of machine error.    Keep your cuffed arm on a flat surface, like a table, and at heart level. Cuff should be at heart level not your arm Keep a log and bring it to every check up.    Sit STILL for 5 minutes prior to taking your BP.  Sit upright, back straight, feet flat on the floor.        Do not check your blood pressure more then once at a time, repeated attempts will only increase the numbers. Only take your B/P twice a day at least an hour after taking your medication. Preferably once in the morning and once in the evening. If you get 3 or more consecutive readings outside of these parameters or have symptoms please let us know so that we can adjust your medication.      If you recently have started, stopped, or changed a medication dosage please give your body at least a week or two to get use to the change.    Avoid high-sodium foods  Avoid eating:  Smoked, cured, salted, and canned meat, fish, and poultry.  Ham, montiel, hot dogs, and luncheon meats.  Regular, hard, and processed cheese and regular peanut butter.  Crackers with salted tops, and other salted snack foods such as pretzels, chips, and salted popcorn.  Frozen prepared meals, unless labeled low-sodium.  Canned and dried soups, broths, and bouillon, unless labeled sodium-free or low-sodium.  Canned vegetables, unless labeled sodium-free or low-sodium.  French fries, pizza, tacos, and other fast foods.  Pickles,

## 2025-08-01 NOTE — PROGRESS NOTES
Chief Complaint   Patient presents with    Hypertension    Cholesterol Problem     Vitals:    08/01/25 1331   BP: 138/80   BP Site: Left Upper Arm   Patient Position: Sitting   BP Cuff Size: Medium Adult   Pulse: 76   SpO2: 99%   Weight: 70.8 kg (156 lb)   Height: 1.575 m (5' 2\")      /80 (BP Site: Left Upper Arm, Patient Position: Sitting, BP Cuff Size: Medium Adult)   Pulse 76   Ht 1.575 m (5' 2\")   Wt 70.8 kg (156 lb)   SpO2 99%   BMI 28.53 kg/m²

## 2025-08-11 ENCOUNTER — HOSPITAL ENCOUNTER (OUTPATIENT)
Facility: HOSPITAL | Age: 65
Discharge: HOME | End: 2025-08-14
Attending: ORTHOPAEDIC SURGERY
Payer: MEDICARE

## 2025-08-11 DIAGNOSIS — M75.112 PARTIAL NONTRAUMATIC TEAR OF ROTATOR CUFF, LEFT: ICD-10-CM

## 2025-08-11 DIAGNOSIS — S42.295D OTHER CLOSED NONDISPLACED FRACTURE OF PROXIMAL END OF LEFT HUMERUS WITH ROUTINE HEALING, SUBSEQUENT ENCOUNTER: ICD-10-CM

## 2025-08-11 PROCEDURE — 73221 MRI JOINT UPR EXTREM W/O DYE: CPT

## (undated) DEVICE — STERILE POLYISOPRENE POWDER-FREE SURGICAL GLOVES: Brand: PROTEXIS

## (undated) DEVICE — PREP SKN PREVAIL 40ML APPL --

## (undated) DEVICE — WATERPROOF, BACTERIA PROOF DRESSING WITH ABSORBENT SEE THROUGH PAD: Brand: OPSITE POST-OP VISIBLE 15X10CM CTN 20

## (undated) DEVICE — 3000CC GUARDIAN II: Brand: GUARDIAN

## (undated) DEVICE — CATHETERIZATION TRAY FOL 14 FR URIMTR STATLOK SURSTP

## (undated) DEVICE — INFECTION CONTROL KIT SYS

## (undated) DEVICE — SOLUTION IV 1000ML 0.9% SOD CHL

## (undated) DEVICE — 1010 S-DRAPE TOWEL DRAPE 10/BX: Brand: STERI-DRAPE™

## (undated) DEVICE — SPHERE STEALTH 12PK/TY --

## (undated) DEVICE — SYRINGE MED 20ML STD CLR PLAS LUERLOCK TIP N CTRL DISP

## (undated) DEVICE — HANDLE LT SNAP ON ULT DURABLE LENS FOR TRUMPF ALC DISPOSABLE

## (undated) DEVICE — DRAPE,REIN 53X77,STERILE: Brand: MEDLINE

## (undated) DEVICE — MEDI-VAC NON-CONDUCTIVE SUCTION TUBING: Brand: CARDINAL HEALTH

## (undated) DEVICE — SUTURE VCRL SZ 0 L18IN ABSRB VLT L36MM CT-1 1/2 CIR J740D

## (undated) DEVICE — MAGNETIC DRAPE: Brand: DEVON

## (undated) DEVICE — SLIM BODY SKIN STAPLER: Brand: APPOSE ULC

## (undated) DEVICE — TOWEL,OR,DSP,ST,BLUE,STD,2/PK,40PK/CS: Brand: MEDLINE

## (undated) DEVICE — COVER,TABLE,60X90,STERILE: Brand: MEDLINE

## (undated) DEVICE — BIPOLAR FORCEPS CORD: Brand: VALLEYLAB

## (undated) DEVICE — COVER,MAYO STAND,STERILE: Brand: MEDLINE

## (undated) DEVICE — SOLUTION IRRIG 1000ML H2O STRL BLT

## (undated) DEVICE — Device

## (undated) DEVICE — ELECTRODE BLDE L4IN NONINSULATED EDGE

## (undated) DEVICE — WRAP FOAM EC BK W 1 ICE PK

## (undated) DEVICE — SURGIFOAM SPNG SZ 100

## (undated) DEVICE — SYR 10ML LUER LOK 1/5ML GRAD --

## (undated) DEVICE — SUTURE VCRL SZ 2-0 L18IN ABSRB UD L26MM CP-2 1/2 CIR REV J762D

## (undated) DEVICE — LAMINECTOMY RICHMOND-LF: Brand: MEDLINE INDUSTRIES, INC.

## (undated) DEVICE — 3M™ TEGADERM™ TRANSPARENT FILM DRESSING FRAME STYLE, 1626W, 4 IN X 4-3/4 IN (10 CM X 12 CM), 50/CT 4CT/CASE: Brand: 3M™ TEGADERM™

## (undated) DEVICE — SUTURE MCRYL SZ 4-0 L27IN ABSRB UD L19MM PS-2 1/2 CIR PRIM Y426H

## (undated) DEVICE — UNDERGLOVE SURG SZ 8 FNGR THK0.21MIL GRN LTX BEAD CUF

## (undated) DEVICE — FLOSEAL MATRIX IS INDICATED IN SURGICAL PROCEDURES (OTHER THAN IN OPHTHALMIC) AS AN ADJUNCT TO HEMOSTASIS WHEN CONTROL OF BLEEDING BY LIGATURE OR CONVENTIONALPROCEDURES IS INEFFECTIVE OR IMPRACTICAL.: Brand: FLOSEAL HEMOSTATIC MATRIX

## (undated) DEVICE — NEEDLE HYPO 16GA L1.5IN PUR POLYPR HUB S STL REG BVL STR

## (undated) DEVICE — TOOL 14MH30 LEGEND 14CM 3MM: Brand: MIDAS REX ™

## (undated) DEVICE — KENDALL SCD EXPRESS SLEEVES, KNEE LENGTH, MEDIUM: Brand: KENDALL SCD

## (undated) DEVICE — DRAIN KT WND 10FR RND 400ML --

## (undated) DEVICE — STERILE-Z SURGICAL PATIENT COVERS CLEAR POLYETHYLENE STERILE UNIVERSAL FIT 10 PER CASE: Brand: STERILE-Z

## (undated) DEVICE — CONTAINER,SPECIMEN,OR STERILE,4OZ: Brand: MEDLINE

## (undated) DEVICE — DRSG PATCH ANTIMIC 1INX4.0MM -- CONVERT TO ITEM 356053

## (undated) DEVICE — KIT JACK TBL PT CARE

## (undated) DEVICE — HANDPIECE SET WITH COAXIAL HIGH FLOW TIP AND SUCTION TUBE: Brand: INTERPULSE

## (undated) DEVICE — BONE WAX WHITE: Brand: BONE WAX WHITE

## (undated) DEVICE — NEEDLE HYPO 22GA L1.5IN BLK S STL HUB POLYPR SHLD REG BVL